# Patient Record
Sex: FEMALE | Race: WHITE | NOT HISPANIC OR LATINO | Employment: FULL TIME | URBAN - METROPOLITAN AREA
[De-identification: names, ages, dates, MRNs, and addresses within clinical notes are randomized per-mention and may not be internally consistent; named-entity substitution may affect disease eponyms.]

---

## 2017-08-01 ENCOUNTER — ALLSCRIPTS OFFICE VISIT (OUTPATIENT)
Dept: OTHER | Facility: OTHER | Age: 37
End: 2017-08-01

## 2017-08-01 PROCEDURE — 87624 HPV HI-RISK TYP POOLED RSLT: CPT | Performed by: OBSTETRICS & GYNECOLOGY

## 2017-08-01 PROCEDURE — G0145 SCR C/V CYTO,THINLAYER,RESCR: HCPCS | Performed by: OBSTETRICS & GYNECOLOGY

## 2017-08-02 ENCOUNTER — LAB REQUISITION (OUTPATIENT)
Dept: LAB | Facility: HOSPITAL | Age: 37
End: 2017-08-02

## 2017-08-02 DIAGNOSIS — Z01.419 ENCOUNTER FOR GYNECOLOGICAL EXAMINATION WITHOUT ABNORMAL FINDING: ICD-10-CM

## 2017-08-03 LAB — HPV RRNA GENITAL QL NAA+PROBE: ABNORMAL

## 2017-08-08 LAB
LAB AP GYN PRIMARY INTERPRETATION: NORMAL
LAB AP LMP: NORMAL
Lab: NORMAL
PATH INTERP SPEC-IMP: NORMAL

## 2017-08-09 ENCOUNTER — GENERIC CONVERSION - ENCOUNTER (OUTPATIENT)
Dept: OTHER | Facility: OTHER | Age: 37
End: 2017-08-09

## 2017-11-07 ENCOUNTER — LAB REQUISITION (OUTPATIENT)
Dept: LAB | Facility: HOSPITAL | Age: 37
End: 2017-11-07
Payer: COMMERCIAL

## 2017-11-07 DIAGNOSIS — R39.9 UNSPECIFIED SYMPTOMS AND SIGNS INVOLVING THE GENITOURINARY SYSTEM: ICD-10-CM

## 2017-11-07 LAB
BACTERIA UR QL AUTO: ABNORMAL /HPF
BILIRUB UR QL STRIP: ABNORMAL
CLARITY UR: ABNORMAL
COLOR UR: ABNORMAL
GLUCOSE UR STRIP-MCNC: ABNORMAL MG/DL
HGB UR QL STRIP.AUTO: ABNORMAL
KETONES UR STRIP-MCNC: ABNORMAL MG/DL
LEUKOCYTE ESTERASE UR QL STRIP: ABNORMAL
NITRITE UR QL STRIP: ABNORMAL
NON-SQ EPI CELLS URNS QL MICRO: ABNORMAL /HPF
PROT UR STRIP-MCNC: ABNORMAL MG/DL
RBC #/AREA URNS AUTO: ABNORMAL /HPF
SP GR UR STRIP.AUTO: 1.03 (ref 1–1.03)
UROBILINOGEN UR QL STRIP.AUTO: ABNORMAL E.U./DL
WBC #/AREA URNS AUTO: ABNORMAL /HPF

## 2017-11-07 PROCEDURE — 81001 URINALYSIS AUTO W/SCOPE: CPT | Performed by: OBSTETRICS & GYNECOLOGY

## 2017-11-07 PROCEDURE — 87086 URINE CULTURE/COLONY COUNT: CPT | Performed by: OBSTETRICS & GYNECOLOGY

## 2017-11-09 LAB — BACTERIA UR CULT: NORMAL

## 2017-11-13 ENCOUNTER — HOSPITAL ENCOUNTER (EMERGENCY)
Facility: HOSPITAL | Age: 37
Discharge: HOME | End: 2017-11-13
Attending: EMERGENCY MEDICINE | Admitting: EMERGENCY MEDICINE
Payer: COMMERCIAL

## 2017-11-13 VITALS
HEART RATE: 96 BPM | RESPIRATION RATE: 16 BRPM | WEIGHT: 192 LBS | OXYGEN SATURATION: 97 % | SYSTOLIC BLOOD PRESSURE: 145 MMHG | DIASTOLIC BLOOD PRESSURE: 81 MMHG | TEMPERATURE: 98.2 F

## 2017-11-13 DIAGNOSIS — O21.9 NAUSEA AND VOMITING IN PREGNANCY: Primary | ICD-10-CM

## 2017-11-13 LAB
ALBUMIN SERPL BCP-MCNC: 3.2 G/DL (ref 3.5–5)
ALP SERPL-CCNC: 84 U/L (ref 46–116)
ALT SERPL W P-5'-P-CCNC: 21 U/L (ref 12–78)
ANION GAP SERPL CALCULATED.3IONS-SCNC: 10 MMOL/L (ref 4–13)
AST SERPL W P-5'-P-CCNC: 40 U/L (ref 5–45)
B-HCG SERPL-ACNC: ABNORMAL MIU/ML
BACTERIA UR QL AUTO: ABNORMAL /HPF
BASOPHILS # BLD AUTO: 0.01 THOUSANDS/ΜL (ref 0–0.1)
BASOPHILS NFR BLD AUTO: 0 % (ref 0–1)
BILIRUB SERPL-MCNC: 0.42 MG/DL (ref 0.2–1)
BILIRUB UR QL STRIP: ABNORMAL
BUN SERPL-MCNC: 6 MG/DL (ref 5–25)
CALCIUM SERPL-MCNC: 9.3 MG/DL (ref 8.3–10.1)
CAOX CRY URNS QL MICRO: ABNORMAL /HPF
CHLORIDE SERPL-SCNC: 100 MMOL/L (ref 100–108)
CLARITY UR: CLEAR
CO2 SERPL-SCNC: 22 MMOL/L (ref 21–32)
COLOR UR: YELLOW
CREAT SERPL-MCNC: 0.58 MG/DL (ref 0.6–1.3)
EOSINOPHIL # BLD AUTO: 0.1 THOUSAND/ΜL (ref 0–0.61)
EOSINOPHIL NFR BLD AUTO: 1 % (ref 0–6)
ERYTHROCYTE [DISTWIDTH] IN BLOOD BY AUTOMATED COUNT: 12.6 % (ref 11.6–15.1)
GFR SERPL CREATININE-BSD FRML MDRD: 118 ML/MIN/1.73SQ M
GLUCOSE SERPL-MCNC: 84 MG/DL (ref 65–140)
GLUCOSE UR STRIP-MCNC: NEGATIVE MG/DL
HCT VFR BLD AUTO: 37 % (ref 34.8–46.1)
HGB BLD-MCNC: 13.2 G/DL (ref 11.5–15.4)
HGB UR QL STRIP.AUTO: NEGATIVE
KETONES UR STRIP-MCNC: ABNORMAL MG/DL
LEUKOCYTE ESTERASE UR QL STRIP: ABNORMAL
LIPASE SERPL-CCNC: 187 U/L (ref 73–393)
LYMPHOCYTES # BLD AUTO: 1.72 THOUSANDS/ΜL (ref 0.6–4.47)
LYMPHOCYTES NFR BLD AUTO: 17 % (ref 14–44)
MCH RBC QN AUTO: 31.7 PG (ref 26.8–34.3)
MCHC RBC AUTO-ENTMCNC: 35.7 G/DL (ref 31.4–37.4)
MCV RBC AUTO: 89 FL (ref 82–98)
MONOCYTES # BLD AUTO: 0.54 THOUSAND/ΜL (ref 0.17–1.22)
MONOCYTES NFR BLD AUTO: 5 % (ref 4–12)
NEUTROPHILS # BLD AUTO: 7.94 THOUSANDS/ΜL (ref 1.85–7.62)
NEUTS SEG NFR BLD AUTO: 77 % (ref 43–75)
NITRITE UR QL STRIP: NEGATIVE
NON-SQ EPI CELLS URNS QL MICRO: ABNORMAL /HPF
NRBC BLD AUTO-RTO: 0 /100 WBCS
PH UR STRIP.AUTO: 5.5 [PH] (ref 4.5–8)
PLATELET # BLD AUTO: 212 THOUSANDS/UL (ref 149–390)
PMV BLD AUTO: 11.1 FL (ref 8.9–12.7)
POTASSIUM SERPL-SCNC: 4.1 MMOL/L (ref 3.5–5.3)
PROT SERPL-MCNC: 8 G/DL (ref 6.4–8.2)
PROT UR STRIP-MCNC: ABNORMAL MG/DL
RBC # BLD AUTO: 4.16 MILLION/UL (ref 3.81–5.12)
RBC #/AREA URNS AUTO: ABNORMAL /HPF
SODIUM SERPL-SCNC: 132 MMOL/L (ref 136–145)
SP GR UR STRIP.AUTO: >=1.03 (ref 1–1.03)
UROBILINOGEN UR QL STRIP.AUTO: 1 E.U./DL
WBC # BLD AUTO: 10.31 THOUSAND/UL (ref 4.31–10.16)
WBC #/AREA URNS AUTO: ABNORMAL /HPF

## 2017-11-13 PROCEDURE — 84702 CHORIONIC GONADOTROPIN TEST: CPT | Performed by: EMERGENCY MEDICINE

## 2017-11-13 PROCEDURE — 83690 ASSAY OF LIPASE: CPT | Performed by: EMERGENCY MEDICINE

## 2017-11-13 PROCEDURE — 81002 URINALYSIS NONAUTO W/O SCOPE: CPT | Performed by: EMERGENCY MEDICINE

## 2017-11-13 PROCEDURE — 96374 THER/PROPH/DIAG INJ IV PUSH: CPT

## 2017-11-13 PROCEDURE — 80053 COMPREHEN METABOLIC PANEL: CPT | Performed by: EMERGENCY MEDICINE

## 2017-11-13 PROCEDURE — 36415 COLL VENOUS BLD VENIPUNCTURE: CPT | Performed by: EMERGENCY MEDICINE

## 2017-11-13 PROCEDURE — 96375 TX/PRO/DX INJ NEW DRUG ADDON: CPT

## 2017-11-13 PROCEDURE — 85025 COMPLETE CBC W/AUTO DIFF WBC: CPT | Performed by: EMERGENCY MEDICINE

## 2017-11-13 PROCEDURE — 96361 HYDRATE IV INFUSION ADD-ON: CPT

## 2017-11-13 PROCEDURE — 81001 URINALYSIS AUTO W/SCOPE: CPT

## 2017-11-13 PROCEDURE — 99283 EMERGENCY DEPT VISIT LOW MDM: CPT

## 2017-11-13 RX ORDER — DOXYLAMINE SUCCINATE AND PYRIDOXINE HYDROCHLORIDE, DELAYED RELEASE TABLETS 10 MG/10 MG 10; 10 MG/1; MG/1
TABLET, DELAYED RELEASE ORAL
COMMUNITY
End: 2018-03-22

## 2017-11-13 RX ORDER — NITROFURANTOIN 25; 75 MG/1; MG/1
100 CAPSULE ORAL ONCE
Status: COMPLETED | OUTPATIENT
Start: 2017-11-13 | End: 2017-11-13

## 2017-11-13 RX ORDER — METOCLOPRAMIDE HYDROCHLORIDE 5 MG/ML
10 INJECTION INTRAMUSCULAR; INTRAVENOUS ONCE
Status: COMPLETED | OUTPATIENT
Start: 2017-11-13 | End: 2017-11-13

## 2017-11-13 RX ORDER — NITROFURANTOIN 25; 75 MG/1; MG/1
100 CAPSULE ORAL 2 TIMES DAILY WITH MEALS
Status: DISCONTINUED | OUTPATIENT
Start: 2017-11-14 | End: 2017-11-13

## 2017-11-13 RX ORDER — METHYLDOPA 250 MG/1
250 TABLET, FILM COATED ORAL
COMMUNITY
Start: 2017-09-08 | End: 2018-03-22

## 2017-11-13 RX ORDER — ONDANSETRON 2 MG/ML
4 INJECTION INTRAMUSCULAR; INTRAVENOUS ONCE
Status: COMPLETED | OUTPATIENT
Start: 2017-11-13 | End: 2017-11-13

## 2017-11-13 RX ADMIN — METOCLOPRAMIDE 10 MG: 5 INJECTION, SOLUTION INTRAMUSCULAR; INTRAVENOUS at 18:44

## 2017-11-13 RX ADMIN — PYRIDOXINE HYDROCHLORIDE 25 MG: 100 INJECTION, SOLUTION INTRAMUSCULAR; INTRAVENOUS at 18:52

## 2017-11-13 RX ADMIN — SODIUM CHLORIDE 1000 ML: 0.9 INJECTION, SOLUTION INTRAVENOUS at 18:44

## 2017-11-13 RX ADMIN — ONDANSETRON 4 MG: 2 INJECTION INTRAMUSCULAR; INTRAVENOUS at 20:57

## 2017-11-13 RX ADMIN — NITROFURANTOIN MONOHYDRATE/MACROCRYSTALLINE 100 MG: 25; 75 CAPSULE ORAL at 20:57

## 2017-11-13 RX ADMIN — SODIUM CHLORIDE 1000 ML: 0.9 INJECTION, SOLUTION INTRAVENOUS at 20:45

## 2017-11-14 NOTE — ED NOTES
Per Dr Mihir Cotter, OB was paged and is to see patient in the ED        Lorena Del Real RN  11/13/17 0273

## 2017-11-14 NOTE — ED NOTES
Patient called in RN and states that she is tired of waiting for OB and would like to go home   Dr Nereida Drew made aware      Ryan Pettit, SAADIA  11/13/17 4456

## 2017-11-14 NOTE — DISCHARGE INSTRUCTIONS
Hyperemesis Gravidarum   WHAT YOU NEED TO KNOW:   Hyperemesis gravidarum is a severe form of nausea and vomiting that happens during pregnancy  Hyperemesis is more severe than morning sickness  It may cause you to have nausea or vomiting all day for many days  It may also keep you from getting enough food and liquid  DISCHARGE INSTRUCTIONS:   Return to the emergency department if:   · You have signs of severe dehydration including little to no urine and dry mouth or lips  · You have severe stomach pain  · You feel too weak or dizzy to stand up  · You see blood in your vomit or bowel movements  Contact your healthcare provider if:   · You cannot keep any food or liquid down  · You are losing weight  · You have a fever  · You have questions or concerns about your condition or care  Medicines:   · Medicines, vitamins, or supplements  may be given to help decrease nausea and vomiting  · Take your medicine as directed  Contact your healthcare provider if you think your medicine is not helping or if you have side effects  Tell him of her if you are allergic to any medicine  Keep a list of the medicines, vitamins, and herbs you take  Include the amounts, and when and why you take them  Bring the list or the pill bottles to follow-up visits  Carry your medicine list with you in case of an emergency  Manage your symptoms:   · Eat small amounts of food every 1 to 2 hours  Some examples of good foods to eat include broth, toast, crackers, fruit, eggs, gelatin, or cottage cheese  Do not eat spicy or high-fat foods  Foods and drinks with ginger, such as ginger ale, may help to decrease nausea and vomiting  · Drink liquids as directed  You may need to drink small amounts of liquid often to prevent dehydration  Ask how much liquid to drink each day and which liquids are best for you  · Rest when you need to    Start activity slowly and work up to your usual routine as you start to feel better  · Medicines, vitamins, or supplements  may be given to help decrease nausea and vomiting  · Weigh yourself daily if directed by your healthcare provider  You may need to keep a record of your daily weights for your healthcare provider  He may want to make sure you are not losing too much weight  Follow up with your healthcare provider as directed:  Write down your questions so you remember to ask them during your visits  © 2017 [employer] Information is for End User's use only and may not be sold, redistributed or otherwise used for commercial purposes  All illustrations and images included in CareNotes® are the copyrighted property of [employer]  or [address]  The above information is an  only  It is not intended as medical advice for individual conditions or treatments  Talk to your doctor, nurse or pharmacist before following any medical regimen to see if it is safe and effective for you

## 2017-11-14 NOTE — ED PROVIDER NOTES
History  Chief Complaint   Patient presents with    Vomiting     pt is 14 weeks pregnant and has been vomiting  PT has been dx with hyperemissis and was just given diclegis  on saturday and she states that she has been vomiting more frequently  pt vomited 6 times today      40year-old  at an estimated 14 weeks by dates presents for evaluation of nausea and vomiting  The patient states that she has had some nausea and vomiting throughout the 1st trimester of her pregnancy  She had been taking Zofran for nausea and vomiting but recently was switched to diclegis and over the past 24 hours has had multiple episodes of vomiting, as many as seven episodes of nonbloody nonbilious vomiting and has had difficulty tolerating solids or liquids by mouth  The patient was seen by her own OBGYN in the office today and her OBGYN suggested that she seek evaluation in the emergency department for her nausea and vomiting  Patient did suffer a subconjunctival hemorrhage from her vomiting  She denies significant abdominal pain and has not had any vaginal bleeding  She was treated for a urinary tract infection starting few days ago  She is currently taking Macrobid twice a day  History provided by:  Patient   used: No    Vomiting   Severity:  Moderate  Duration:  2 days  Timing:  Constant  Number of daily episodes:  7  Quality:  Stomach contents  Progression:  Unchanged  Chronicity:  New  Recent urination:  Decreased  Relieved by:  Nothing  Exacerbated by: PO intake  Ineffective treatments:  Antiemetics  Associated symptoms: no abdominal pain, no cough, no diarrhea, no fever, no headaches and no sore throat    Risk factors: pregnant        Prior to Admission Medications   Prescriptions Last Dose Informant Patient Reported? Taking?    Doxylamine-Pyridoxine (DICLEGIS) 10-10 MG TBEC   Yes Yes   Sig: Take by mouth   methyldopa (ALDOMET) 250 mg tablet   Yes Yes   Sig: Take 250 mg by mouth sertraline (ZOLOFT) 50 mg tablet   Yes Yes   Sig: Take 50 mg by mouth daily      Facility-Administered Medications: None       Past Medical History:   Diagnosis Date    Hypertension     Psychiatric disorder        Past Surgical History:   Procedure Laterality Date    ABDOMINAL SURGERY         History reviewed  No pertinent family history  I have reviewed and agree with the history as documented  Social History   Substance Use Topics    Smoking status: Never Smoker    Smokeless tobacco: Never Used    Alcohol use No        Review of Systems   Constitutional: Negative for activity change, appetite change, fatigue, fever and unexpected weight change  HENT: Negative for congestion, sore throat and voice change  Eyes: Negative for pain and visual disturbance  Respiratory: Negative for cough, chest tightness and shortness of breath  Cardiovascular: Negative for chest pain  Gastrointestinal: Positive for vomiting  Negative for abdominal pain, diarrhea and nausea  Endocrine: Negative for polyphagia and polyuria  Genitourinary: Negative for difficulty urinating, dysuria, flank pain, frequency and urgency  Musculoskeletal: Negative for back pain, gait problem, neck pain and neck stiffness  Skin: Negative for color change and rash  Allergic/Immunologic: Negative for immunocompromised state  Neurological: Negative for dizziness, syncope, speech difficulty, light-headedness, numbness and headaches  Hematological: Does not bruise/bleed easily  Psychiatric/Behavioral: Negative for confusion and decreased concentration         Physical Exam  ED Triage Vitals [11/13/17 1754]   Temperature Pulse Respirations Blood Pressure SpO2   98 2 °F (36 8 °C) (!) 106 18 147/67 98 %      Temp Source Heart Rate Source Patient Position - Orthostatic VS BP Location FiO2 (%)   Oral Monitor Sitting Right arm --      Pain Score       No Pain           Orthostatic Vital Signs  Vitals:    11/13/17 1904 11/13/17 2100 11/13/17 2217 11/13/17 2342   BP: 137/73 134/69 149/72 145/81   Pulse: 90 96 93 96   Patient Position - Orthostatic VS: Sitting Sitting Sitting Sitting       Physical Exam   Constitutional: She is oriented to person, place, and time  She appears well-developed and well-nourished  HENT:   Head: Normocephalic and atraumatic  Eyes: Conjunctivae and EOM are normal  Pupils are equal, round, and reactive to light  No scleral icterus  Right eye subconjunctival hemorrhage noted  Neck: Normal range of motion  Neck supple  No JVD present  No tracheal deviation present  Cardiovascular: Normal rate and regular rhythm  Pulmonary/Chest: Effort normal and breath sounds normal  No respiratory distress  Abdominal: Soft  She exhibits no distension  There is no tenderness  There is no rebound and no guarding  Soft, nondistended, nontender to palpation  No rebound tenderness or guarding  Normal bowel sounds  A bedside ultrasound demonstrates a intrauterine pregnancy with a fetal heart rate of 160 beats per minute  Musculoskeletal: Normal range of motion  She exhibits no tenderness or deformity  Lymphadenopathy:     She has no cervical adenopathy  Neurological: She is alert and oriented to person, place, and time  No gross focal sensory or motor deficits   Skin: Skin is warm and dry  No rash noted  She is not diaphoretic  Psychiatric: She has a normal mood and affect  Vitals reviewed        ED Medications  Medications   sodium chloride 0 9 % bolus 1,000 mL (0 mL Intravenous Stopped 11/13/17 2040)   metoclopramide (REGLAN) injection 10 mg (10 mg Intravenous Given 11/13/17 1844)   pyridoxine (VITAMIN B6) injection 25 mg (25 mg Intravenous Given 11/13/17 1852)   ondansetron (ZOFRAN) injection 4 mg (4 mg Intravenous Given 11/13/17 2057)   sodium chloride 0 9 % bolus 1,000 mL (0 mL Intravenous Stopped 11/13/17 2155)   nitrofurantoin (MACROBID) extended-release capsule 100 mg (100 mg Oral Given 11/13/17 2057) Diagnostic Studies  Results Reviewed     Procedure Component Value Units Date/Time    POCT urinalysis dipstick [[id number]]  (Abnormal) Resulted:  11/13/17 2004    Lab Status:  Final result Specimen:  Urine Updated:  11/13/17 2037    Urine Microscopic [[id number]]  (Abnormal) Collected:  11/13/17 2120    Lab Status:  Final result Specimen:  Urine from Urine, Clean Catch Updated:  11/13/17 2037     RBC, UA None Seen /hpf      WBC, UA 1-2 (A) /hpf      Epithelial Cells Occasional /hpf      Bacteria, UA Occasional /hpf      Ca Oxalate Elly, UA Moderate (A) /hpf     ED Urine Macroscopic [[id number]]  (Abnormal) Collected:  11/13/17 2120    Lab Status:  Final result Specimen:  Urine Updated:  11/13/17 2005     Color, UA Yellow     Clarity, UA Clear     pH, UA 5 5     Leukocytes, UA Trace (A)     Nitrite, UA Negative     Protein, UA 30 (1+) (A) mg/dl      Glucose, UA Negative mg/dl      Ketones, UA 80 (3+) (A) mg/dl      Urobilinogen, UA 1 0 E U /dl      Bilirubin, UA Interference- unable to analyze (A)     Blood, UA Negative     Specific Gravity, UA >=1 030    Narrative:       CLINITEK RESULT    Quantitative hCG [[id number]]  (Abnormal) Collected:  11/13/17 1844    Lab Status:  Final result Specimen:  Blood from Arm, Right Updated:  11/13/17 1944     HCG, Quant 42,522 7 (H) mIU/mL     Narrative:          Expected Ranges:     Approximate               Approximate HCG  Gestation age          Concentration ( mIU/mL)  _____________          ______________________   Lindia Million                      HCG values  0 2-1                       5-50  1-2                           2-3                         100-5000  3-4                         500-59818  4-5                         1000-44139  5-6                         [id number]  6-8                         [id number]  8-12                        03811-333407    Comprehensive metabolic panel [[id number]]  (Abnormal) Collected:  11/13/17 1844    Lab Status:  Final result Specimen:  Blood from Arm, Right Updated:  11/13/17 1924     Sodium 132 (L) mmol/L      Potassium 4 1 mmol/L      Chloride 100 mmol/L      CO2 22 mmol/L      Anion Gap 10 mmol/L      BUN 6 mg/dL      Creatinine 0 58 (L) mg/dL      Glucose 84 mg/dL      Calcium 9 3 mg/dL      AST 40 U/L      ALT 21 U/L      Alkaline Phosphatase 84 U/L      Total Protein 8 0 g/dL      Albumin 3 2 (L) g/dL      Total Bilirubin 0 42 mg/dL      eGFR 118 ml/min/1 73sq m     Narrative:         National Kidney Disease Education Program recommendations are as follows:  GFR calculation is accurate only with a steady state creatinine  Chronic Kidney disease less than 60 ml/min/1 73 sq  meters  Kidney failure less than 15 ml/min/1 73 sq  meters      Lipase [[id number]]  (Normal) Collected:  11/13/17 1844    Lab Status:  Final result Specimen:  Blood from Arm, Right Updated:  11/13/17 1924     Lipase 187 u/L     CBC and differential [[id number]]  (Abnormal) Collected:  11/13/17 1844    Lab Status:  Final result Specimen:  Blood from Arm, Right Updated:  11/13/17 1901     WBC 10 31 (H) Thousand/uL      RBC 4 16 Million/uL      Hemoglobin 13 2 g/dL      Hematocrit 37 0 %      MCV 89 fL      MCH 31 7 pg      MCHC 35 7 g/dL      RDW 12 6 %      MPV 11 1 fL      Platelets 509 Thousands/uL      nRBC 0 /100 WBCs      Neutrophils Relative 77 (H) %      Lymphocytes Relative 17 %      Monocytes Relative 5 %      Eosinophils Relative 1 %      Basophils Relative 0 %      Neutrophils Absolute 7 94 (H) Thousands/µL      Lymphocytes Absolute 1 72 Thousands/µL      Monocytes Absolute 0 54 Thousand/µL      Eosinophils Absolute 0 10 Thousand/µL      Basophils Absolute 0 01 Thousands/µL                  No orders to display              Procedures  Pelvic Ultrasound  Performed by: Romulo Beltrán  Authorized by: Romulo Beltrán     Procedure date/time:  11/13/2017 6:43 PM  Patient location:  ED  Other Assisting Provider: No    Procedure details:     Indications: evaluate for IUP      Assess:  Intrauterine pregnancy and fetal viability    Technique:  Transabdominal obstetric (limited) exam  Uterine findings:     Intrauterine pregnancy: identified      Single gestation: identified      Fetal heart rate: identified      Fetal heart rate (bpm):  160  Other findings:     Free pelvic fluid: not identified      Free peritoneal fluid: not identified             Phone Contacts  ED Phone Contact    ED Course  ED Course                                MDM  Number of Diagnoses or Management Options  Nausea and vomiting in pregnancy: new and requires workup  Diagnosis management comments: 80-year-old female presented for evaluation of nausea and vomiting in pregnancy  She is an estimated 14 weeks by dates  She has had previous ultrasounds by her OBGYN to verify intrauterine pregnancy  A bedside ultrasound today demonstrated a viable stark IUP with a fetal heart rate of 160  She was noted to have ketones in her urine although blood work appeared within normal limits  A quantitative HCG was around 40,000  Patient has not had any vaginal bleeding  There is no sign of ongoing or worsening urinary tract infection on urinalysis today  Review of the medical record demonstrates that patient's urine culture from several days ago grew only 20-29,000 colonies of mixed contaminants  She was treated with both Reglan and IV Zofran in the emergency department and received 2 L of IV crystalloid  She was re-evaluated prior to discharge and had significant improvement of her symptoms  She was able to tolerate fluids by mouth  She will be discharged to follow up with her OBGYN  The patient had been taking Zofran previously with reasonable results  She states that she has Zofran at home and does not want another prescription  Will discharge to follow up with PCP  Discussed return precautions for new or worsening symptoms         Amount and/or Complexity of Data Reviewed  Clinical lab tests: reviewed and ordered  Review and summarize past medical records: yes  Independent visualization of images, tracings, or specimens: yes      CritCare Time    Disposition  Final diagnoses:   Nausea and vomiting in pregnancy     Time reflects when diagnosis was documented in both MDM as applicable and the Disposition within this note     Time User Action Codes Description Comment    11/13/2017 11:37 PM Neville Chas PARKER Add [O21 9] Nausea and vomiting in pregnancy       ED Disposition     ED Disposition Condition Comment    Discharge  Faby Johnston discharge to home/self care  Condition at discharge: Good        Follow-up Information     Follow up With Specialties Details Why Contact Info    Slick Dey DO Obstetrics and Gynecology  Please follow-up with your OBGYN in 2 to 3 days if your symptoms do not improve  If you have new or worsening symptoms please call your doctor or return to the emergency department  [place] [id number] Jovan [address]          Discharge Medication List as of 11/13/2017 11:38 PM      CONTINUE these medications which have NOT CHANGED    Details   Doxylamine-Pyridoxine (DICLEGIS) 10-10 MG TBEC Take by mouth, Historical Med      methyldopa (ALDOMET) 250 mg tablet Take 250 mg by mouth, Starting Fri 9/8/2017, Until Sat 9/8/2018, Historical Med      sertraline (ZOLOFT) 50 mg tablet Take 50 mg by mouth daily, Historical Med           No discharge procedures on file      ED Provider  Electronically Signed by           Fabio Dyer MD  11/13/17 7719

## 2018-01-11 NOTE — RESULT NOTES
Verified Results  (1) THIN PREP PAP WITH IMAGING [id number] 03:05PM Triny Dasilva     Test Name Result Flag Reference   LAB AP CASE REPORT (Report)     Gynecologic Cytology Report            Case: [id number]                  Authorizing Provider: Gigi Rome DO    Collected:      08/01/2017 1505        First Screen:     LUIS ENRIQUE Junior    Received:      08/02/2017 1511        Pathologist:      Marisa Pappas MD                                 Specimen:  LIQUID-BASED PAP, SCREENING, Cervix   HPV HIGH RISK RESULT (Report)     HPV, High Risk: HPV POS, HPV16 NEG, HPV18 NEG      Other High Risk HPV Positive, HPV 16 Negative, HPV 18 Negative  Specimen is positive for the DNA of any one of, or combination of the following high risk HPV types: 29,02,05,51,47,47,96,20,28,84,40,09  HPV types 16 and 18 DNA were undetectable or below the pre-set threshold  [employer]???s FDA approved Flynn 4800 is utilized with strict adherence to the ???s instruction  manual to test for the presence of High-Risk HPV DNA, as well as HPV 16 and HPV 18  This instrument  has been validated by our laboratory and/or by the   A negative result does not preclude the presence of HPV infection because results depend on adequate  specimen collection, absence of inhibitors and sufficient DNA to be detected  Additionally, HPV negative  results are not intended to prevent women from proceeding to colposcopy if clinically warranted  Positive HPV test results indicate the presence of any one or more of the high risk types, but since patients  are often co-infected with low-risk types it does not rule out the presence of low-risk types in patients  with mixed infections     LAB AP GYN PRIMARY INTERPRETATION      Epithelial cell abnormality  Electronically signed by Marisa Pappas MD on 8/8/2017 at 12:23 PM   LAB AP GYN INTERPRETATION      Atypical squamous cells of undetermined significance  Fungal organisms morphologically consistent with Candida spp   LAB AP GYN SPECIMEN ADEQUACY      Satisfactory for evaluation  Endocervical/transformation zone component present  LAB AP GYN NOTE      Interpretation performed at [facility], [address], [address]   LAB AP GYN ADDITIONAL INFORMATION (Report)     [employer]'s FDA approved ,  and ThinPrep Imaging System are   utilized with strict adherence to the 's instruction manual to   prepare gynecologic and non-gynecologic cytology specimens for the   production of ThinPrep slides as well as for gynecologic ThinPrep imaging  These processes have been validated by our laboratory and/or by the     The Pap test is not a diagnostic procedure and should not be used as the   sole means to detect cervical cancer  It is only a screening procedure to   aid in the detection of cervical cancer and its precursors  Both   false-negative and false-positive results have been experienced  Your   patient's test result should be interpreted in this context together with   the history and clinical findings     LAB AP CLINICAL INFORMATION      Encounter for gynecological examination without abnormal finding   LAB AP LMP 7/13/2017

## 2018-01-13 VITALS
SYSTOLIC BLOOD PRESSURE: 122 MMHG | HEIGHT: 66 IN | BODY MASS INDEX: 32.47 KG/M2 | DIASTOLIC BLOOD PRESSURE: 80 MMHG | WEIGHT: 202 LBS

## 2018-03-20 ENCOUNTER — TRANSCRIBE ORDERS (OUTPATIENT)
Dept: PERINATAL CARE | Facility: CLINIC | Age: 38
End: 2018-03-20

## 2018-03-20 DIAGNOSIS — O99.810 IMPAIRED GLUCOSE TOLERANCE IN PREGNANCY: Primary | ICD-10-CM

## 2018-03-22 ENCOUNTER — OFFICE VISIT (OUTPATIENT)
Dept: PERINATAL CARE | Facility: CLINIC | Age: 38
End: 2018-03-22
Payer: COMMERCIAL

## 2018-03-22 VITALS
WEIGHT: 201.4 LBS | DIASTOLIC BLOOD PRESSURE: 62 MMHG | BODY MASS INDEX: 32.37 KG/M2 | SYSTOLIC BLOOD PRESSURE: 137 MMHG | HEART RATE: 86 BPM | HEIGHT: 66 IN

## 2018-03-22 DIAGNOSIS — Z3A.33 33 WEEKS GESTATION OF PREGNANCY: ICD-10-CM

## 2018-03-22 DIAGNOSIS — O24.414 INSULIN CONTROLLED GESTATIONAL DIABETES MELLITUS (GDM) IN THIRD TRIMESTER: Primary | ICD-10-CM

## 2018-03-22 DIAGNOSIS — O99.810 IMPAIRED GLUCOSE TOLERANCE IN PREGNANCY: ICD-10-CM

## 2018-03-22 PROCEDURE — G0108 DIAB MANAGE TRN  PER INDIV: HCPCS

## 2018-03-22 RX ORDER — GLYBURIDE-METFORMIN HYDROCHLORIDE 2.5; 5 MG/1; MG/1
1 TABLET ORAL
COMMUNITY
End: 2018-03-22

## 2018-03-22 RX ORDER — IRON POLYSACCHARIDE COMPLEX 150 MG
150 CAPSULE ORAL DAILY
COMMUNITY
End: 2021-06-28

## 2018-03-22 RX ORDER — PEN NEEDLE, DIABETIC 32GX 5/32"
NEEDLE, DISPOSABLE MISCELLANEOUS
Qty: 60 EACH | Refills: 0 | Status: SHIPPED | OUTPATIENT
Start: 2018-03-22 | End: 2021-06-28

## 2018-03-22 RX ORDER — ONDANSETRON HYDROCHLORIDE 24 MG/1
4 TABLET, FILM COATED ORAL ONCE
COMMUNITY
End: 2018-04-24 | Stop reason: HOSPADM

## 2018-03-22 NOTE — PROGRESS NOTES
DATE: 18   RE: Lisbet Kim   : 1980  HARSHIL: Estimated Date of Delivery: 18  EGA:  33w2d    Dear Dr Chloe Roper:     Your patient was seen in the office today for insulin teaching  Please refer to Diabetes and Pregnancy Progress Record for complete documentation of patients blood glucose levels  Height:   5'6"  Weight:  201 4 lbs    The patient was instructed on the following:     Levemir Pen use  Initiate  at 10 units at 7 am and 10 units at 7 pm  Will titrate dose   Glyburide discontinued secondary to early am hypoglycemia   Discussed Metformin risks and benefits  Informed it does cross placenta   Reviewed basal/bolus concept   Insulin administration times, insulin action   Hypoglycemia signs, symptoms and treatment  Information sheet provided   Increase in maternal-fetal surveillance with insulin initiation   Side effects of hyperglycemia in pregnancy including macrosomia,  hypoglycemia, polyhydramnios, pre-term labor and stillbirth   Continue to monitor blood glucoses via fingerstick fasting (goal 60 mg/dl to 90 mg/dl) and two hours post prandial (goal less than 120 mg/dl)  Add 3 am blood sugar check   GDM 2400 calorie diet, 3 meals and 3 snacks including protein provided   Follow up with our office on 1 week for evaluation of blood glucose levels   Non-stress testing two times weekly and ILSA testing beginning at 32 weeks gestation   Schedule fetal growth in  center and repeat in 4 weeks   HbA1c and CMP ordered  Thank you for the opportunity to participate in the care of this patient  I can be reached at [phone] should you have any questions  Time spent with patient 60 minutes; time spent face to face counseling greater than 50% of the appointment      Sincerely,     ZBIGNIEW Mckeon  Diabetes Educator  Diabetes and Pregnancy Program

## 2018-03-22 NOTE — PROGRESS NOTES
Date:  18  RE: Nandini Call    : 1980  HARSHIL: Estimated Date of Delivery: 18  EGA: 33w2d             Dear Dr Dario Devlin and doctor's at [facility],   Thank you for referring your patient to the Diabetes and Pregnancy Program at [address]  The patient attended Class 1 received the following education:     Pathophysiology of diabetes and pregnancy  This includes maternal-fetal complications such as fetal macrosomia,  hypoglycemia, polyhydramnios, increased incidence of  section, pre-term labor and in severe cases, fetal demise and stillbirth   Self-monitoring of blood glucose levels: fasting (goal 60mg/dl to 90mg/dl) and two hours after the start of the meal less (goal less than 120mg/dl)  The patient has been testing with a One Touch UltraMini blood glucose meter  Please see attached blood sugar report   Medical Nutrition Therapy for diabetes and pregnancy  The patient was provided with a 2400 calorie gestational diabetes meal plan and the following was reviewed:     o Basic review of macronutrients   o Meal pattern should consist of three small meals and three snacks daily  o Carbohydrate gram amounts per meal   o Instructions on how to read a food label  o Appropriate serving sizes for carbohydrates and proteins  o Incorporating protein at each meal and snack  * Patient's food diary was reviewed today  Patient has been trying to follow diet  Advised patient to avoid desert items  Provided patient with another three day food diary to bring to follow up appointment   Report blood glucose levels to the [address] [place] [address] weekly or as directed:  o Phone : [phone]  If no response in 24 hours, call [phone]   o Fax: [phone]  o Email: [email or web address]  [address] [place] [address], Diabetes and Pregnancy Assistant will contact patient to schedule a follow up appointmnet     Additionally, fetal ultrasound evaluation by the Perinatologist has been scheduled to assure continuity of care  Patient provided a 21 day blood sugar log  The most recent blood sugars are noted below and additional blood sugars are scanned under media  Date Fasting Post-  breakfast Post-  lunch Post-  dinner Before bedtime Carbs Comments   3-9 98 110 112 140      3-10 93 90 100 119   Started 2 5 mg glyburide at bedtime   3-11 88  150      3-12 78 115 114 113   Forgot glyburide   3-13 104 136 96 106      3-14 96 123 93 94      3-15 88 104 106 NR      3-16 101 175 126 156      3-17 0100  68 126 NR NR      3-18 0127  55 103 94 133   Decrease dose glyburide to 1 25 mg   3-19 94 105 134 132      3-20 97 107 82 110      3-21 94 106 100 99      3-22 103           Patient met with ZBIGNIEW Caraballo for insulin education please refer to today's note  Current regimen:  Glyburide 2 5 mg at bedtime (started as above by Ob)  Patient reported splitting the pill in half due to early morning hypoglycemia  Patient was following a 3 meal/3 snack diet including protein in all meals and snacks which was instructed by ZBIGNIEW at the [facility] office  Self blood glucose monitoring 4 times per day including fasting blood sugars, 2 hours pp (patient reported 2 hours starting at the end of the meal)  Plan:  Start Levemir- 10 units 7 am and 10 units 7 pm  Insulin education completed by Yane London, [facility]  Discontinue glyburide  2400 calorie gestational diabetes meal plan; 3 meals/3 snacks  Patient has a fairly active job as a   Self blood glucose monitoring 4 times per day including fasting blood sugars, 2 hours pp (advised patient to 2 hours starting at the beginning of the meal)  A follow up appointment will be scheduled  Date due to report next:  Requested patient report on Monday, 3-26  Please contact the Diabetes and Pregnancy Program at [phone] if you have any questions    Time spent with patient [phone] ; time spent face to face counseling greater than 50% of the appointment      Sincerely,   Loulou Richardson RD,LDN,CDE  Diabetes Educator   Diabetes and Pregnancy Program

## 2018-03-23 ENCOUNTER — DOCUMENTATION (OUTPATIENT)
Dept: PERINATAL CARE | Facility: CLINIC | Age: 38
End: 2018-03-23

## 2018-03-23 NOTE — PROGRESS NOTES
Date:  18  RE: Megan Spain    : 1980  HARSHIL: Estimated Date of Delivery: 18  EGA: 33w3d  Ob: Season's of Life  Meter: One Touch Ultra Mini    Insulin dependent GDM    Date 3 am Fasting Post-  breakfast Post-  lunch Post-  dinner   3/22     132   3/23 114 103 117                                       Current regimen:  Levemir 10 units at 7 am and 10 units at 7 pm    2400 calorie GDM diet with 3 meals and 3 snacks including protein  SMBG FBS, 2 hour PP and 3 am     Plan:  Increase Levemir from 10 to 15 units at 7 am and from 10 to 15 units at 7 pm  Titrate until goal BG  Follow GDM diet and SMBG  Check CMP and A1c  Consider adding Metformin to regimen  Always have glucose available for hypoglycemia, use 15 by 15 rule  Date due to report next:  Monday, 3/26 or sooner if needed       ZBIGNIEW Solis  Diabetes and Pregnancy Program

## 2018-03-26 ENCOUNTER — DOCUMENTATION (OUTPATIENT)
Dept: PERINATAL CARE | Facility: CLINIC | Age: 38
End: 2018-03-26

## 2018-03-26 NOTE — PROGRESS NOTES
Date:  18  RE: Lisbet Kim    : 1980  HARSHIL: Estimated Date of Delivery: 18  EGA: 33w6d  Ob: Season's of Life  Meter: One Touch Ultra Mini    Insulin dependent GDM    Date 3 am Fasting Post-  breakfast Post-  lunch Post-  dinner   3/23    89 148   3/24 91 78  139   3/25 93 94 112 119 91   3/26 103 101 102                       Current regimen:  Levemir 15 titrated to 17 units at 730 am and 15 units at 730 pm    2400 calorie GDM diet with 3 meals and 3 snacks including protein  SMBG FBS, 2 hour PP and 3 am     Plan:  Continue Levemir from 17 units at 730 am, due to FBS >90, increase Levemir from 15 to 18 units at 730 pm   Continue GDM diet and SMBG  Pending A1c and CMP results  Consider adding Metformin to regimen  Always have glucose available for hypoglycemia, use 15 by 15 rule  Fetal growth scan every 4 weeks  Date due to report next:  Thursday, 3/29 or sooner if needed       ZBIGNIEW Mckeon  Diabetes and Pregnancy Program

## 2018-03-29 ENCOUNTER — OFFICE VISIT (OUTPATIENT)
Dept: PERINATAL CARE | Facility: CLINIC | Age: 38
End: 2018-03-29
Payer: COMMERCIAL

## 2018-03-29 VITALS
BODY MASS INDEX: 32.53 KG/M2 | SYSTOLIC BLOOD PRESSURE: 128 MMHG | DIASTOLIC BLOOD PRESSURE: 63 MMHG | HEIGHT: 66 IN | HEART RATE: 92 BPM | WEIGHT: 202.4 LBS

## 2018-03-29 DIAGNOSIS — O24.414 INSULIN CONTROLLED GESTATIONAL DIABETES MELLITUS (GDM) IN THIRD TRIMESTER: Primary | ICD-10-CM

## 2018-03-29 PROCEDURE — G0108 DIAB MANAGE TRN  PER INDIV: HCPCS

## 2018-03-30 NOTE — PROGRESS NOTES
DATE:  18  RE: Bacilio Rodriguez    : 1980    HARSHIL: 18    EGA: 34w3d    Dear Dr Angela Freeman of [facility] doctors,    The patient also reported blood sugars at Class 2 appointment  Date Fasting Post-  breakfast Post-  lunch Post-  dinner Before bedtime Carbs 3 AM  Comments   3-22    132   114     3- 103 117 89 148   91     3-24 78  139   93     3- 94 112 119 91   103     3-26 101 102 77 164   96     3-27 98 134 105 116 142  102     3-28 98 89 128 121 143  104     3-29 90 90 139- 1hr 121            Current regimen:  2400 calorie gestational diabetes meal plan  Levemir- 17 units at 7:30 AM and 18 units at 7:30 PM  Consider adding Metformin to regimen  Pending A1c and CMP results  Always have glucose available for hypoglycemia, use 15 by 15 rule  Fetal growth scan every 4 weeks  Self blood glucose monitoring including fasting blood sugar and 2 hours pp unless otherwise noted  Patient was instructed to discontinue 3 AM testing  Plan:  Levemir- increase 17 to 20 units at 7:30 am                 Increase 18 to 22 units at 7:30 pm     Continue 2400 calorie gestational diabetes meal plan  Continue self blood glucose monitoring 4 times per day  Date due to report next:  Tuesday, 4-3-18    Thank you for referring your patient to the Diabetes and Pregnancy Program at [address]  The patient attended Class 2 received the following education:    Weight gain during in pregnancy  Based on the patients height of 5' 6" (1 676 m) inches, pre-pregnancy weight of 202 pounds BMI (32 6), we would recommend a total weight gain of 11-20 pounds for the pregnancy   The patients current weight is 91 8 kg (202 lb 6 4 oz)pounds, and her weight gain to date is 0 pounds  Based on this, we recommend a weight gain of no more than 11-20 pounds for the remainder of the pregnancy   Medical Nutrition Therapy for diabetes and pregnancy    The patients three day food diary was reviewed and discussed  The patient was instructed on the following:  o Individualized meal plan    o Use of food diary to maintain a meal plan    o Importance of protein as it relates to blood glucose control   Review of blood glucose log  Reinforcement of blood glucose goals and reporting guidelines   Ultrasounds every four weeks in the [facility] to evaluate fetal growth   Exercise Guidelines:   o Walking up to thirty minutes daily can reduce blood glucose levels  o Monitor for greater than four contractions per hour     o The patient has been instructed not to begin physical activity if she has been instructed not to exercise by your office   Sick day guidelines and hypoglycemia with treatment   Post-partum guidelines:  o Completion of a 75 gram glucose tolerance test at 6 weeks post-partum to check for type 2 diabetes  o 20% weight loss and 30 minutes of exercise 5 times per week reduces the risk of type 2 diabetes   Breastfeeding guidelines   Report blood glucose levels to [address] weekly or as directed  o Phone: [phone]  If no response in 24 hours, call [phone]    o Fax: [phone]  o Email: [email or web address]    Please contact the Diabetes and Pregnancy Program at [phone] if you have questions  Time spent with patient [phone]; time spent face to face counseling greater than 50% of the appointment      Sincerely,     Vishal Casas RD,LDN,CDE  Diabetes Educator  Diabetes and Pregnancy Program

## 2018-04-04 ENCOUNTER — DOCUMENTATION (OUTPATIENT)
Dept: PERINATAL CARE | Facility: CLINIC | Age: 38
End: 2018-04-04

## 2018-04-04 NOTE — PROGRESS NOTES
Date:  18  RE: Cliff Redding    : 1980  HARSHIL: Estimated Date of Delivery: 18  EGA: 35w1d  Ob: Season's of Life  Meter: One Touch Ultra Mini    Insulin dependent GDM    Date 3 am Fasting Post-  breakfast Post-  lunch Post-  dinner   3/30  95 110 138 143   3/31  97 113 135 130   4/1  100 131 98 127   /2  100 120 108 142   /3  96 120 103 113   /4  85 98 100      Current regimen:  Levemir 20 units at 730 am and 22 units at 730 pm    2400 calorie GDM diet with 3 meals and 3 snacks including protein  Reports decreasing carb intake and noticing improvement with blood sugar readings  SMBG FBS and 2 hour PP  Plan:  Continue Levemir 20 units at 730 am    Increase Levemir from 22 to 24 units at 730 pm    Follow GDM diet and SMBG  Check CMP and A1c  Always have glucose available for hypoglycemia, use 15 by 15 rule  Date due to report next:  Friday,  or sooner if needed       ZBIGNIEW King  Diabetes and Pregnancy Program

## 2018-04-05 ENCOUNTER — ROUTINE PRENATAL (OUTPATIENT)
Dept: PERINATAL CARE | Facility: CLINIC | Age: 38
End: 2018-04-05
Payer: COMMERCIAL

## 2018-04-05 VITALS
WEIGHT: 205.8 LBS | DIASTOLIC BLOOD PRESSURE: 66 MMHG | SYSTOLIC BLOOD PRESSURE: 130 MMHG | HEART RATE: 97 BPM | HEIGHT: 66 IN | BODY MASS INDEX: 33.07 KG/M2

## 2018-04-05 DIAGNOSIS — O99.213 MATERNAL OBESITY SYNDROME IN THIRD TRIMESTER: ICD-10-CM

## 2018-04-05 DIAGNOSIS — O40.3XX0 POLYHYDRAMNIOS IN SINGLETON PREGNANCY IN THIRD TRIMESTER: ICD-10-CM

## 2018-04-05 DIAGNOSIS — O09.513 ELDERLY PRIMIGRAVIDA IN THIRD TRIMESTER: ICD-10-CM

## 2018-04-05 DIAGNOSIS — Z3A.35 35 WEEKS GESTATION OF PREGNANCY: ICD-10-CM

## 2018-04-05 DIAGNOSIS — O24.414 INSULIN CONTROLLED GESTATIONAL DIABETES MELLITUS IN THIRD TRIMESTER: Primary | ICD-10-CM

## 2018-04-05 PROCEDURE — 76818 FETAL BIOPHYS PROFILE W/NST: CPT | Performed by: OBSTETRICS & GYNECOLOGY

## 2018-04-05 PROCEDURE — 76811 OB US DETAILED SNGL FETUS: CPT | Performed by: OBSTETRICS & GYNECOLOGY

## 2018-04-05 PROCEDURE — 99242 OFF/OP CONSLTJ NEW/EST SF 20: CPT | Performed by: OBSTETRICS & GYNECOLOGY

## 2018-04-05 NOTE — PROGRESS NOTES
Please refer to the [facility] ultrasound report in Ob Procedures for additional information regarding the visit to the [facility] include 234 beds at the [facility], [facility]  today

## 2018-04-05 NOTE — PROGRESS NOTES
NST procedure and expected outcome explained to patient  Daily fetal kick count discussed with handout given  Patient verbalized understanding of all and was receptive      Rip SAADIA Samuel

## 2018-04-07 PROBLEM — O40.3XX0 POLYHYDRAMNIOS IN SINGLETON PREGNANCY IN THIRD TRIMESTER: Status: ACTIVE | Noted: 2018-04-07

## 2018-04-10 ENCOUNTER — DOCUMENTATION (OUTPATIENT)
Dept: PERINATAL CARE | Facility: CLINIC | Age: 38
End: 2018-04-10

## 2018-04-10 ENCOUNTER — ROUTINE PRENATAL (OUTPATIENT)
Dept: PERINATAL CARE | Facility: CLINIC | Age: 38
End: 2018-04-10
Payer: COMMERCIAL

## 2018-04-10 ENCOUNTER — APPOINTMENT (OUTPATIENT)
Dept: PERINATAL CARE | Facility: CLINIC | Age: 38
End: 2018-04-10
Payer: COMMERCIAL

## 2018-04-10 VITALS
SYSTOLIC BLOOD PRESSURE: 137 MMHG | HEART RATE: 86 BPM | WEIGHT: 208.4 LBS | HEIGHT: 66 IN | DIASTOLIC BLOOD PRESSURE: 68 MMHG | BODY MASS INDEX: 33.49 KG/M2

## 2018-04-10 VITALS
SYSTOLIC BLOOD PRESSURE: 137 MMHG | HEIGHT: 66 IN | BODY MASS INDEX: 33.49 KG/M2 | DIASTOLIC BLOOD PRESSURE: 68 MMHG | HEART RATE: 86 BPM | WEIGHT: 208.4 LBS

## 2018-04-10 DIAGNOSIS — O24.414 INSULIN CONTROLLED GESTATIONAL DIABETES MELLITUS IN THIRD TRIMESTER: Primary | ICD-10-CM

## 2018-04-10 DIAGNOSIS — O24.414 INSULIN CONTROLLED GESTATIONAL DIABETES MELLITUS IN THIRD TRIMESTER: ICD-10-CM

## 2018-04-10 DIAGNOSIS — O40.3XX0 POLYHYDRAMNIOS IN SINGLETON PREGNANCY IN THIRD TRIMESTER: ICD-10-CM

## 2018-04-10 PROCEDURE — 76825 ECHO EXAM OF FETAL HEART: CPT | Performed by: PEDIATRICS

## 2018-04-10 PROCEDURE — 76827 ECHO EXAM OF FETAL HEART: CPT | Performed by: PEDIATRICS

## 2018-04-10 PROCEDURE — 93325 DOPPLER ECHO COLOR FLOW MAPG: CPT | Performed by: PEDIATRICS

## 2018-04-10 PROCEDURE — 59025 FETAL NON-STRESS TEST: CPT | Performed by: OBSTETRICS & GYNECOLOGY

## 2018-04-10 NOTE — PROGRESS NOTES
Date:  04/10/18  RE: Chan Armijo    : 1980  HARSHIL: Estimated Date of Delivery: 18  EGA: 36w0d  Ob: Season's of Life  Meter: One Touch Ultra Mini    Insulin dependent GDM    Date Fasting Post-  breakfast Post-  lunch Post-  dinner       156   4/5 84 89 115 141   4/6 84 74 73 138   4/7 84 96 105 111   4/8 89 98 136 120   4/9 92 95 84 131   4/10 89 89 86      Current regimen:  Levemir 20 units at 730 am and 24 units at 730 pm    2400 calorie GDM diet with 3 meals and 3 snacks including protein  SMBG FBS and 2 hour PP  Plan:  Increase Levemir from 20 to 24 units at 730 am    Increase Levemir from 24 to 26 units at 730 pm    Follow GDM diet and continue SMBG  Always have glucose available for hypoglycemia, use 15 by 15 rule  3/24/18 A1c 5 3%  Copy of A1c and CMP results requested from [facility]  Follow-up fetal surveillance as recommended by Dr Isak Aguirre  Date due to report next:  Friday,  or sooner if needed       ZBIGNIEW Johansen  Diabetes and Pregnancy Program

## 2018-04-11 PROCEDURE — 87653 STREP B DNA AMP PROBE: CPT | Performed by: OBSTETRICS & GYNECOLOGY

## 2018-04-11 NOTE — PROGRESS NOTES
[address]: Ms Garo Whitehead was seen today at 36w0d for NST (found under the pregnancy episode) which I reviewed the RN assessment and agree  Please don't hesitate to contact our office with any concerns or questions    Carie Calles MD

## 2018-04-12 ENCOUNTER — LAB REQUISITION (OUTPATIENT)
Dept: LAB | Facility: HOSPITAL | Age: 38
End: 2018-04-12
Payer: COMMERCIAL

## 2018-04-12 DIAGNOSIS — O09.513 SUPERVISION OF ELDERLY PRIMIGRAVIDA IN THIRD TRIMESTER: ICD-10-CM

## 2018-04-14 LAB — GP B STREP DNA SPEC QL NAA+PROBE: NORMAL

## 2018-04-16 ENCOUNTER — TELEPHONE (OUTPATIENT)
Dept: PERINATAL CARE | Facility: CLINIC | Age: 38
End: 2018-04-16

## 2018-04-16 NOTE — TELEPHONE ENCOUNTER
Date:  18  RE: Laura Nguyen    : 1980  Estimated Date of Delivery: 18  EGA: 36w6d  OB/GYN: [facility]      Date Fasting Post-  breakfast Post-  lunch Post-  dinner Before bedtime Carbs Comments   4-11 85 88 97 134      4-12 81 89 89 86      4-13 86 78 89 129      4-14 77 90 85 91      4-15 89 113 104                     Current regimen:  Levemir 24 units at 0730       24 units at [address] (after discussion with ObGyn patient did not increase this dose)  2400 calorie diabetes and pregnancy diet with 3 meals/snacks  Blood sugar testing fasting and 2 hours after meals  Plan:  Continue current regimen  Advised patient if repeating meals that cause blood sugar to be >119 to decrease by 1 carbohydrate and increase by 1 protein serving  Date due to report next:  Thursday, 18    Sincerely,   Moriah Lutz, RN  Diabetes Educator   Diabetes and Pregnancy Program  The diabetic nurse educator note was reviewed by me  I agree with the findings in the note

## 2018-04-17 DIAGNOSIS — O24.414 INSULIN CONTROLLED GESTATIONAL DIABETES MELLITUS (GDM) IN THIRD TRIMESTER: ICD-10-CM

## 2018-04-17 DIAGNOSIS — Z3A.33 33 WEEKS GESTATION OF PREGNANCY: ICD-10-CM

## 2018-04-17 RX ORDER — INSULIN DETEMIR 100 [IU]/ML
INJECTION, SOLUTION SUBCUTANEOUS
Qty: 6 PEN | Refills: 0 | Status: SHIPPED | OUTPATIENT
Start: 2018-04-17 | End: 2018-04-24 | Stop reason: HOSPADM

## 2018-04-19 ENCOUNTER — ULTRASOUND (OUTPATIENT)
Dept: PERINATAL CARE | Facility: CLINIC | Age: 38
End: 2018-04-19
Payer: COMMERCIAL

## 2018-04-19 VITALS
BODY MASS INDEX: 32.72 KG/M2 | HEART RATE: 85 BPM | HEIGHT: 66 IN | DIASTOLIC BLOOD PRESSURE: 61 MMHG | SYSTOLIC BLOOD PRESSURE: 133 MMHG | WEIGHT: 203.6 LBS

## 2018-04-19 DIAGNOSIS — O09.513 ELDERLY PRIMIGRAVIDA IN THIRD TRIMESTER: ICD-10-CM

## 2018-04-19 DIAGNOSIS — O40.3XX0 POLYHYDRAMNIOS IN SINGLETON PREGNANCY IN THIRD TRIMESTER: ICD-10-CM

## 2018-04-19 DIAGNOSIS — Z3A.37 37 WEEKS GESTATION OF PREGNANCY: ICD-10-CM

## 2018-04-19 DIAGNOSIS — O24.414 INSULIN CONTROLLED GESTATIONAL DIABETES MELLITUS IN THIRD TRIMESTER: Primary | ICD-10-CM

## 2018-04-19 PROCEDURE — 76818 FETAL BIOPHYS PROFILE W/NST: CPT | Performed by: OBSTETRICS & GYNECOLOGY

## 2018-04-19 PROCEDURE — 76816 OB US FOLLOW-UP PER FETUS: CPT | Performed by: OBSTETRICS & GYNECOLOGY

## 2018-04-19 PROCEDURE — 99212 OFFICE O/P EST SF 10 MIN: CPT | Performed by: OBSTETRICS & GYNECOLOGY

## 2018-04-19 RX ORDER — VALACYCLOVIR HYDROCHLORIDE 500 MG/1
500 TABLET, FILM COATED ORAL 2 TIMES DAILY
Refills: 1 | COMMUNITY
Start: 2018-04-12 | End: 2018-04-24 | Stop reason: HOSPADM

## 2018-04-19 NOTE — PROGRESS NOTES
Please refer to the [facility] ultrasound report in Ob Procedures for additional information regarding the visit to the [facility]  today

## 2018-04-19 NOTE — PROGRESS NOTES
Non-Stress Testing:    Non-Stress test, equipment, and expected outcomes reviewed  Verified with teach back method  Pt verbalizes +FM  Pt denies ALL:               Leaking of fluid   Contractions   Vaginal bleeding   Decreased fetal movement      Dr Kevin Siddiqi also aware of pt's contractions  Pt states they are not uncomfortable and will notify her doctor if they become more intense and frequent

## 2018-04-20 ENCOUNTER — HOSPITAL ENCOUNTER (OUTPATIENT)
Facility: HOSPITAL | Age: 38
Discharge: HOME | End: 2018-04-20
Attending: OBSTETRICS & GYNECOLOGY | Admitting: OBSTETRICS & GYNECOLOGY
Payer: COMMERCIAL

## 2018-04-20 VITALS
HEIGHT: 66 IN | SYSTOLIC BLOOD PRESSURE: 128 MMHG | WEIGHT: 204 LBS | DIASTOLIC BLOOD PRESSURE: 60 MMHG | BODY MASS INDEX: 32.78 KG/M2 | TEMPERATURE: 97.6 F | RESPIRATION RATE: 16 BRPM | HEART RATE: 77 BPM

## 2018-04-20 PROBLEM — Z3A.37 37 WEEKS GESTATION OF PREGNANCY: Status: ACTIVE | Noted: 2018-04-20

## 2018-04-20 LAB
ABO GROUP BLD: NORMAL
BASOPHILS # BLD AUTO: 0.01 THOUSANDS/ΜL (ref 0–0.1)
BASOPHILS NFR BLD AUTO: 0 % (ref 0–1)
BLD GP AB SCN SERPL QL: NEGATIVE
EOSINOPHIL # BLD AUTO: 0.04 THOUSAND/ΜL (ref 0–0.61)
EOSINOPHIL NFR BLD AUTO: 0 % (ref 0–6)
ERYTHROCYTE [DISTWIDTH] IN BLOOD BY AUTOMATED COUNT: 13.1 % (ref 11.6–15.1)
GLUCOSE SERPL-MCNC: 112 MG/DL (ref 65–140)
GLUCOSE SERPL-MCNC: 117 MG/DL (ref 65–140)
GLUCOSE SERPL-MCNC: 69 MG/DL (ref 65–140)
GLUCOSE SERPL-MCNC: 76 MG/DL (ref 65–140)
GLUCOSE SERPL-MCNC: 77 MG/DL (ref 65–140)
GLUCOSE SERPL-MCNC: 86 MG/DL (ref 65–140)
GLUCOSE SERPL-MCNC: 88 MG/DL (ref 65–140)
HCT VFR BLD AUTO: 34.2 % (ref 34.8–46.1)
HGB BLD-MCNC: 11.4 G/DL (ref 11.5–15.4)
LYMPHOCYTES # BLD AUTO: 1.13 THOUSANDS/ΜL (ref 0.6–4.47)
LYMPHOCYTES NFR BLD AUTO: 8 % (ref 14–44)
MCH RBC QN AUTO: 29.9 PG (ref 26.8–34.3)
MCHC RBC AUTO-ENTMCNC: 33.3 G/DL (ref 31.4–37.4)
MCV RBC AUTO: 90 FL (ref 82–98)
MONOCYTES # BLD AUTO: 0.51 THOUSAND/ΜL (ref 0.17–1.22)
MONOCYTES NFR BLD AUTO: 4 % (ref 4–12)
NEUTROPHILS # BLD AUTO: 11.88 THOUSANDS/ΜL (ref 1.85–7.62)
NEUTS SEG NFR BLD AUTO: 88 % (ref 43–75)
NRBC BLD AUTO-RTO: 0 /100 WBCS
PLATELET # BLD AUTO: 191 THOUSANDS/UL (ref 149–390)
PMV BLD AUTO: 12.2 FL (ref 8.9–12.7)
RBC # BLD AUTO: 3.81 MILLION/UL (ref 3.81–5.12)
RH BLD: POSITIVE
SPECIMEN EXPIRATION DATE: NORMAL
WBC # BLD AUTO: 13.57 THOUSAND/UL (ref 4.31–10.16)

## 2018-04-20 PROCEDURE — 86850 RBC ANTIBODY SCREEN: CPT | Performed by: OBSTETRICS & GYNECOLOGY

## 2018-04-20 PROCEDURE — G0463 HOSPITAL OUTPT CLINIC VISIT: HCPCS

## 2018-04-20 PROCEDURE — 86900 BLOOD TYPING SEROLOGIC ABO: CPT | Performed by: OBSTETRICS & GYNECOLOGY

## 2018-04-20 PROCEDURE — 86901 BLOOD TYPING SEROLOGIC RH(D): CPT | Performed by: OBSTETRICS & GYNECOLOGY

## 2018-04-20 PROCEDURE — 99213 OFFICE O/P EST LOW 20 MIN: CPT

## 2018-04-20 PROCEDURE — 82948 REAGENT STRIP/BLOOD GLUCOSE: CPT

## 2018-04-20 PROCEDURE — 85025 COMPLETE CBC W/AUTO DIFF WBC: CPT | Performed by: OBSTETRICS & GYNECOLOGY

## 2018-04-20 RX ORDER — PROMETHAZINE HYDROCHLORIDE 25 MG/ML
12.5 INJECTION, SOLUTION INTRAMUSCULAR; INTRAVENOUS ONCE
Status: COMPLETED | OUTPATIENT
Start: 2018-04-20 | End: 2018-04-20

## 2018-04-20 RX ORDER — SODIUM CHLORIDE 9 MG/ML
125 INJECTION, SOLUTION INTRAVENOUS CONTINUOUS
Status: DISCONTINUED | OUTPATIENT
Start: 2018-04-20 | End: 2018-04-20 | Stop reason: HOSPADM

## 2018-04-20 RX ORDER — BUTORPHANOL TARTRATE 1 MG/ML
1 INJECTION, SOLUTION INTRAMUSCULAR; INTRAVENOUS ONCE
Status: COMPLETED | OUTPATIENT
Start: 2018-04-20 | End: 2018-04-20

## 2018-04-20 RX ORDER — ONDANSETRON 4 MG/1
4 TABLET, ORALLY DISINTEGRATING ORAL EVERY 6 HOURS PRN
Status: DISCONTINUED | OUTPATIENT
Start: 2018-04-20 | End: 2018-04-20 | Stop reason: HOSPADM

## 2018-04-20 RX ORDER — FAMOTIDINE 20 MG/1
20 TABLET, FILM COATED ORAL DAILY
COMMUNITY
End: 2018-04-24 | Stop reason: HOSPADM

## 2018-04-20 RX ORDER — BUTORPHANOL TARTRATE 1 MG/ML
2 INJECTION, SOLUTION INTRAMUSCULAR; INTRAVENOUS ONCE
Status: COMPLETED | OUTPATIENT
Start: 2018-04-20 | End: 2018-04-20

## 2018-04-20 RX ORDER — PROMETHAZINE HYDROCHLORIDE 25 MG/ML
25 INJECTION, SOLUTION INTRAMUSCULAR; INTRAVENOUS ONCE
Status: COMPLETED | OUTPATIENT
Start: 2018-04-20 | End: 2018-04-20

## 2018-04-20 RX ADMIN — BUTORPHANOL TARTRATE 1 MG: 1 INJECTION, SOLUTION INTRAMUSCULAR; INTRAVENOUS at 11:15

## 2018-04-20 RX ADMIN — PROMETHAZINE HYDROCHLORIDE 12.5 MG: 25 INJECTION INTRAMUSCULAR; INTRAVENOUS at 11:15

## 2018-04-20 RX ADMIN — BUTORPHANOL TARTRATE 2 MG: 1 INJECTION, SOLUTION INTRAMUSCULAR; INTRAVENOUS at 15:23

## 2018-04-20 RX ADMIN — BUTORPHANOL TARTRATE 1 MG: 1 INJECTION, SOLUTION INTRAMUSCULAR; INTRAVENOUS at 08:04

## 2018-04-20 RX ADMIN — SODIUM CHLORIDE 125 ML/HR: 0.9 INJECTION, SOLUTION INTRAVENOUS at 08:00

## 2018-04-20 RX ADMIN — PROMETHAZINE HYDROCHLORIDE 25 MG: 25 INJECTION INTRAMUSCULAR; INTRAVENOUS at 08:04

## 2018-04-20 RX ADMIN — ONDANSETRON 4 MG: 4 TABLET, ORALLY DISINTEGRATING ORAL at 05:52

## 2018-04-20 NOTE — OB LABOR/OXYTOCIN SAFETY PROGRESS
All Woo 45 y o  female MRN: [id number]    Unit/Bed#: L&D 326-01 Encounter: [id number]    Obstetric History       T0      L0     SAB0   TAB0   Ectopic0   Multiple0   Live Births0      Gestational Age: 37w3d     Contraction Frequency (minutes): occasional  Contraction Quality: Moderate  Tachysystole: No   Dilation: 4        Effacement (%): 90  Station: -2  Baseline Rate: 125 bpm     FHR Category: Category I          Notes/comments:       Patient currently states that contractions have now lessened in intensity  No cervical change on exam   Discussed in length with patient that without medical indication we cannot induce her at 37 weeks gestation, in without further cervical change over approximately 8 hours that is unlikely she is currently in labor  Instructed patient not to hesitate to call if she starts to experience contractions that are intensifying or occurring more frequently, vaginal bleeding, leakage of fluid, decreased fetal movement  Reviewed fetal kick counts  Will discharge patient home recommended increased p o  Hydration, Tylenol, heating pads, warm showers, and massage  Patient states that she understands all topics over discussed and is agreeable with the plan      Case discussed with Dr Lauren Candelaria MD 2018 5:47 PM

## 2018-04-20 NOTE — PROGRESS NOTES
Triage Note - OB  Christian Sport 45 y o  female MRN: [id number], : 1980  Pt of [facility] (Dr Elva Turner)  Unit/Bed#: L&D 326-01 Encounter: [id number]    Chief Complaint: ctxs    Subjective:    HPI: 45 y o  G1  at 37w3d with c/o contractions  neg vaginal bleeding   neg loss of fluid   pos fetal movement     Objective:      GBS: neg  Blood type: O+  Estimated Date of Delivery: 18    Vitals:        FHR baseline: 130s  FHR variability: moderate  NST: reactive     Evaluate for Labor >34 wks: Subjective:   Pt c/o ctxs    Objective: In-office exam on 18 was 1 cm   @ 0520 SVE: 1/80/-2, [place]: q 3-5 mins , w/nurse chaperone Bety in room, Pain 7/10      Assessment:  45 y o  female G1 at 44w3d with c/o ctxs    Plan:   Will sign out recheck to day team    Samra Heller MD, Resident - OB-GYN  2018 5:17 AM

## 2018-04-20 NOTE — OB LABOR/OXYTOCIN SAFETY PROGRESS
Labor Progress Note - Valentin Hanley 45 y o  female MRN: [id number]    Unit/Bed#: L&D 326-01 Encounter: [id number]    Obstetric History       T0      L0     SAB0   TAB0   Ectopic0   Multiple0   Live Births0      Gestational Age: 37w3d     Contraction Frequency (minutes): 5-6  Contraction Quality: Mild  Tachysystole: No   Dilation: 3        Effacement (%): 90  Station: -2  Baseline Rate: 115 bpm     FHR Category: Category I          Notes/comments:   S/p Stadol/Phenergan, reports improved pain with contractions after medication but now starting to increase again as pain medications wears off  Sly every 5-6 minutes  Category I FHT  SVE with minimal change from prior examination, 3/90/-2, early labor  Patient will ambulate, possibly for 2nd therapeutic rest, then reassess cervix for change  GBS negative, no PCN indicated    Dr Lucero Gamez aware            Dayanna Liang MD 2018 9:52 AM

## 2018-04-20 NOTE — OB LABOR/OXYTOCIN SAFETY PROGRESS
Labor Progress Note - Danyel Man 45 y o  female MRN: [id number]    Unit/Bed#: L&D 326-01 Encounter: [id number]    Obstetric History       T0      L0     SAB0   TAB0   Ectopic0   Multiple0   Live Births0      Gestational Age: 37w3d     Contraction Frequency (minutes): 4  Contraction Quality: Strong  Tachysystole: No   Dilation: 2-3        Effacement (%): 80  Station: -2  Baseline Rate: 115 bpm     FHR Category: Category I          Notes/comments:   Patient uncomfortable with contractions, barbara every 4 minutes  SVE changed to 2-3/80/-2, intact membranes  Category I FHT, baseline change to 115    Early labor @ 37w3d  Therapeutic rest w/ Stadol and Phenergan and recheck  GBS negative    Discussed w/ Dr Nicolette Jain MD 2018 7:45 AM

## 2018-04-20 NOTE — OB LABOR/OXYTOCIN SAFETY PROGRESS
Labor Progress Note - Oskar Santiago 45 y o  female MRN: [id number]    Unit/Bed#: L&D 326-01 Encounter: [id number]    Obstetric History       T0      L0     SAB0   TAB0   Ectopic0   Multiple0   Live Births0      Gestational Age: 37w3d     Contraction Frequency (minutes): 3-5  Contraction Quality: Moderate  Tachysystole: No   Dilation: 4        Effacement (%): 90  Station: -2  Baseline Rate: 115 bpm     FHR Category: Category I          Notes/comments:   Patient more uncomfortable with contractions  Sly every 3-5 minutes   Category I FHT  SVE /-2  Will administer Stadol/Phenergan    Early labor  A2GDM  GBS negative      Dr Regina Lopez MD 2018 10:42 AM

## 2018-04-20 NOTE — OB LABOR/OXYTOCIN SAFETY PROGRESS
Labor Progress Note - Mulu Maryam 45 y o  female MRN: [id number]    Unit/Bed#: L&D 326-01 Encounter: [id number]    Obstetric History       T0      L0     SAB0   TAB0   Ectopic0   Multiple0   Live Births0      Gestational Age: 37w3d     Contraction Frequency (minutes): 5-6  Contraction Quality: Moderate  Tachysystole: No   Dilation: 4        Effacement (%): 90  Station: -2  Baseline Rate: 125 bpm     FHR Category: Category I          Notes/comments:     She is extremely uncomfortable but has not made cervical change  She is in early labor  2mg stadol now   Will continue therapeutic rest     D/w Dr Jadyn George MD 2018 2:36 PM

## 2018-04-20 NOTE — OB LABOR/OXYTOCIN SAFETY PROGRESS
Labor Progress Note - Mulu Francisco 45 y o  female MRN: [id number]    Unit/Bed#: L&D 326-01 Encounter: [id number]    Obstetric History       T0      L0     SAB0   TAB0   Ectopic0   Multiple0   Live Births0      Gestational Age: 37w3d     Contraction Frequency (minutes): 5-6  Contraction Quality: Moderate  Tachysystole: No   Dilation: 4        Effacement (%): 90  Station: -2  Baseline Rate: 125 bpm     FHR Category: Category I          Notes/comments:   Patient reports increased pain with contractions as medications are wearing off  Sly irregularly every 3-4 minutes  SVE unchanged from prior, /  Discussed discharge home with patient, however due to pain she declines at this time  Patient encouraged to eat lunch, walk and possibly use jacuzzi for relief     Will re-evaluate in 2 hours to assess for cervical change    Dr Jordan Ano aware            Isac Alpers, MD 2018

## 2018-04-21 ENCOUNTER — ANESTHESIA (INPATIENT)
Dept: LABOR AND DELIVERY | Facility: HOSPITAL | Age: 38
End: 2018-04-21
Payer: COMMERCIAL

## 2018-04-21 ENCOUNTER — HOSPITAL ENCOUNTER (INPATIENT)
Facility: HOSPITAL | Age: 38
LOS: 3 days | Discharge: HOME | End: 2018-04-24
Attending: OBSTETRICS & GYNECOLOGY | Admitting: OBSTETRICS & GYNECOLOGY
Payer: COMMERCIAL

## 2018-04-21 ENCOUNTER — ANESTHESIA EVENT (INPATIENT)
Dept: LABOR AND DELIVERY | Facility: HOSPITAL | Age: 38
End: 2018-04-21
Payer: COMMERCIAL

## 2018-04-21 DIAGNOSIS — Z3A.37 37 WEEKS GESTATION OF PREGNANCY: Primary | ICD-10-CM

## 2018-04-21 PROBLEM — K21.9 GERD (GASTROESOPHAGEAL REFLUX DISEASE): Status: ACTIVE | Noted: 2017-05-09

## 2018-04-21 PROBLEM — I10 ESSENTIAL HYPERTENSION: Status: ACTIVE | Noted: 2017-05-09

## 2018-04-21 PROBLEM — A60.00 HERPES, GENITAL: Status: ACTIVE | Noted: 2017-08-18

## 2018-04-21 LAB
BASOPHILS # BLD AUTO: 0.01 THOUSANDS/ΜL (ref 0–0.1)
BASOPHILS NFR BLD AUTO: 0 % (ref 0–1)
EOSINOPHIL # BLD AUTO: 0.07 THOUSAND/ΜL (ref 0–0.61)
EOSINOPHIL NFR BLD AUTO: 1 % (ref 0–6)
ERYTHROCYTE [DISTWIDTH] IN BLOOD BY AUTOMATED COUNT: 13.3 % (ref 11.6–15.1)
GLUCOSE SERPL-MCNC: 68 MG/DL (ref 65–140)
GLUCOSE SERPL-MCNC: 68 MG/DL (ref 65–140)
GLUCOSE SERPL-MCNC: 70 MG/DL (ref 65–140)
GLUCOSE SERPL-MCNC: 71 MG/DL (ref 65–140)
GLUCOSE SERPL-MCNC: 72 MG/DL (ref 65–140)
GLUCOSE SERPL-MCNC: 84 MG/DL (ref 65–140)
GLUCOSE SERPL-MCNC: 87 MG/DL (ref 65–140)
GLUCOSE SERPL-MCNC: 90 MG/DL (ref 65–140)
GLUCOSE SERPL-MCNC: 92 MG/DL (ref 65–140)
HCT VFR BLD AUTO: 32.8 % (ref 34.8–46.1)
HGB BLD-MCNC: 10.9 G/DL (ref 11.5–15.4)
LYMPHOCYTES # BLD AUTO: 1.25 THOUSANDS/ΜL (ref 0.6–4.47)
LYMPHOCYTES NFR BLD AUTO: 9 % (ref 14–44)
MCH RBC QN AUTO: 29.8 PG (ref 26.8–34.3)
MCHC RBC AUTO-ENTMCNC: 33.2 G/DL (ref 31.4–37.4)
MCV RBC AUTO: 90 FL (ref 82–98)
MONOCYTES # BLD AUTO: 0.72 THOUSAND/ΜL (ref 0.17–1.22)
MONOCYTES NFR BLD AUTO: 5 % (ref 4–12)
NEUTROPHILS # BLD AUTO: 11.84 THOUSANDS/ΜL (ref 1.85–7.62)
NEUTS SEG NFR BLD AUTO: 85 % (ref 43–75)
NRBC BLD AUTO-RTO: 0 /100 WBCS
PLATELET # BLD AUTO: 218 THOUSANDS/UL (ref 149–390)
PMV BLD AUTO: 12.7 FL (ref 8.9–12.7)
RBC # BLD AUTO: 3.66 MILLION/UL (ref 3.81–5.12)
WBC # BLD AUTO: 13.89 THOUSAND/UL (ref 4.31–10.16)

## 2018-04-21 PROCEDURE — G0463 HOSPITAL OUTPT CLINIC VISIT: HCPCS

## 2018-04-21 PROCEDURE — 86592 SYPHILIS TEST NON-TREP QUAL: CPT | Performed by: OBSTETRICS & GYNECOLOGY

## 2018-04-21 PROCEDURE — 99211 OFF/OP EST MAY X REQ PHY/QHP: CPT

## 2018-04-21 PROCEDURE — 82948 REAGENT STRIP/BLOOD GLUCOSE: CPT

## 2018-04-21 PROCEDURE — 85025 COMPLETE CBC W/AUTO DIFF WBC: CPT | Performed by: OBSTETRICS & GYNECOLOGY

## 2018-04-21 RX ORDER — OXYTOCIN/RINGER'S LACTATE 30/500 ML
PLASTIC BAG, INJECTION (ML) INTRAVENOUS
Status: COMPLETED
Start: 2018-04-21 | End: 2018-04-22

## 2018-04-21 RX ORDER — SODIUM CHLORIDE 9 MG/ML
125 INJECTION, SOLUTION INTRAVENOUS CONTINUOUS
Status: DISCONTINUED | OUTPATIENT
Start: 2018-04-21 | End: 2018-04-22

## 2018-04-21 RX ORDER — PROMETHAZINE HYDROCHLORIDE 25 MG/ML
25 INJECTION, SOLUTION INTRAMUSCULAR; INTRAVENOUS EVERY 6 HOURS PRN
Status: DISCONTINUED | OUTPATIENT
Start: 2018-04-21 | End: 2018-04-21

## 2018-04-21 RX ORDER — ONDANSETRON 4 MG/1
4 TABLET, ORALLY DISINTEGRATING ORAL EVERY 6 HOURS PRN
Status: DISCONTINUED | OUTPATIENT
Start: 2018-04-21 | End: 2018-04-21

## 2018-04-21 RX ORDER — METOCLOPRAMIDE HYDROCHLORIDE 5 MG/ML
10 INJECTION INTRAMUSCULAR; INTRAVENOUS EVERY 6 HOURS PRN
Status: DISCONTINUED | OUTPATIENT
Start: 2018-04-21 | End: 2018-04-22

## 2018-04-21 RX ORDER — VALACYCLOVIR HYDROCHLORIDE 500 MG/1
500 TABLET, FILM COATED ORAL 2 TIMES DAILY
Status: DISCONTINUED | OUTPATIENT
Start: 2018-04-21 | End: 2018-04-22

## 2018-04-21 RX ORDER — BUTORPHANOL TARTRATE 1 MG/ML
2 INJECTION, SOLUTION INTRAMUSCULAR; INTRAVENOUS ONCE
Status: COMPLETED | OUTPATIENT
Start: 2018-04-21 | End: 2018-04-21

## 2018-04-21 RX ORDER — ROPIVACAINE HYDROCHLORIDE 2 MG/ML
INJECTION, SOLUTION EPIDURAL; INFILTRATION; PERINEURAL
Status: COMPLETED
Start: 2018-04-21 | End: 2018-04-21

## 2018-04-21 RX ORDER — ONDANSETRON 2 MG/ML
4 INJECTION INTRAMUSCULAR; INTRAVENOUS EVERY 6 HOURS PRN
Status: DISCONTINUED | OUTPATIENT
Start: 2018-04-21 | End: 2018-04-22

## 2018-04-21 RX ORDER — CHLOROPROCAINE HYDROCHLORIDE 30 MG/ML
INJECTION, SOLUTION EPIDURAL; INFILTRATION; INTRACAUDAL; PERINEURAL AS NEEDED
Status: DISCONTINUED | OUTPATIENT
Start: 2018-04-21 | End: 2018-04-22 | Stop reason: SURG

## 2018-04-21 RX ORDER — SERTRALINE HYDROCHLORIDE 100 MG/1
100 TABLET, FILM COATED ORAL DAILY
Status: DISCONTINUED | OUTPATIENT
Start: 2018-04-21 | End: 2018-04-24 | Stop reason: HOSPADM

## 2018-04-21 RX ORDER — ROPIVACAINE HYDROCHLORIDE 2 MG/ML
INJECTION, SOLUTION EPIDURAL; INFILTRATION; PERINEURAL AS NEEDED
Status: DISCONTINUED | OUTPATIENT
Start: 2018-04-21 | End: 2018-04-22 | Stop reason: SURG

## 2018-04-21 RX ADMIN — SERTRALINE HYDROCHLORIDE 100 MG: 100 TABLET ORAL at 11:11

## 2018-04-21 RX ADMIN — SODIUM CHLORIDE 999 ML/HR: 0.9 INJECTION, SOLUTION INTRAVENOUS at 11:25

## 2018-04-21 RX ADMIN — VALACYCLOVIR HYDROCHLORIDE 500 MG: 500 TABLET, FILM COATED ORAL at 20:22

## 2018-04-21 RX ADMIN — ONDANSETRON 4 MG: 4 TABLET, ORALLY DISINTEGRATING ORAL at 08:59

## 2018-04-21 RX ADMIN — ROPIVACAINE HYDROCHLORIDE 100 ML: 2 INJECTION, SOLUTION EPIDURAL; INFILTRATION at 12:22

## 2018-04-21 RX ADMIN — ONDANSETRON 4 MG: 2 INJECTION INTRAMUSCULAR; INTRAVENOUS at 21:02

## 2018-04-21 RX ADMIN — VALACYCLOVIR HYDROCHLORIDE 500 MG: 500 TABLET, FILM COATED ORAL at 11:12

## 2018-04-21 RX ADMIN — PROMETHAZINE HYDROCHLORIDE 25 MG: 25 INJECTION INTRAMUSCULAR; INTRAVENOUS at 08:54

## 2018-04-21 RX ADMIN — ONDANSETRON 4 MG: 2 INJECTION INTRAMUSCULAR; INTRAVENOUS at 15:37

## 2018-04-21 RX ADMIN — SODIUM CHLORIDE 125 ML/HR: 0.9 INJECTION, SOLUTION INTRAVENOUS at 20:32

## 2018-04-21 RX ADMIN — BUTORPHANOL TARTRATE 2 MG: 1 INJECTION, SOLUTION INTRAMUSCULAR; INTRAVENOUS at 08:51

## 2018-04-21 RX ADMIN — ROPIVACAINE HYDROCHLORIDE: 2 INJECTION, SOLUTION EPIDURAL; INFILTRATION at 18:46

## 2018-04-21 RX ADMIN — SODIUM CHLORIDE 125 ML/HR: 0.9 INJECTION, SOLUTION INTRAVENOUS at 12:51

## 2018-04-21 RX ADMIN — CHLOROPROCAINE HYDROCHLORIDE 6 ML: 30 INJECTION, SOLUTION EPIDURAL; INFILTRATION at 23:54

## 2018-04-21 RX ADMIN — ROPIVACAINE HYDROCHLORIDE: 2 INJECTION, SOLUTION EPIDURAL; INFILTRATION at 23:52

## 2018-04-21 RX ADMIN — SODIUM CHLORIDE 125 ML/HR: 0.9 INJECTION, SOLUTION INTRAVENOUS at 08:42

## 2018-04-21 RX ADMIN — ROPIVACAINE HYDROCHLORIDE 5 ML: 2 INJECTION, SOLUTION EPIDURAL; INFILTRATION at 17:03

## 2018-04-21 NOTE — ANESTHESIA PREPROCEDURE EVALUATION
Review of Systems/Medical History  Patient summary reviewed        Cardiovascular  Negative cardio ROS Hypertension ,    Pulmonary  Negative pulmonary ROS        GI/Hepatic  Negative GI/hepatic ROS   GERD ,        Negative  ROS        Endo/Other  Negative endo/other ROS Diabetes well controlled ,      GYN  Negative gynecology ROS          Hematology  Negative hematology ROS Anemia ,     Musculoskeletal  Negative musculoskeletal ROS        Neurology  Negative neurology ROS      Psychology   Negative psychology ROS Anxiety, Depression ,              Physical Exam    Airway    Mallampati score: II  TM Distance: >3 FB  Neck ROM: full     Dental   No notable dental hx     Cardiovascular  Comment: Negative ROS, Rhythm: regular, Rate: normal, Cardiovascular exam normal    Pulmonary  Pulmonary exam normal Breath sounds clear to auscultation,     Other Findings        Anesthesia Plan  ASA Score- 3     Anesthesia Type- epidural with ASA Monitors  Additional Monitors:   Airway Plan:         Plan Factors-    Induction- intravenous  Postoperative Plan-     Informed Consent- Anesthetic plan and risks discussed with patient

## 2018-04-21 NOTE — OB LABOR/OXYTOCIN SAFETY PROGRESS
Labor Progress Note - Midge Furnace 45 y o  female MRN: [id number]    Unit/Bed#: L&D 326-01 Encounter: [id number]    Obstetric History       T0      L0     SAB0   TAB0   Ectopic0   Multiple0   Live Births0      Gestational Age: 37w4d     Contraction Frequency (minutes): 3 (pt  on her side - TOCO not showing contractions)  Contraction Quality: Moderate  Tachysystole: No   Dilation: 9        Effacement (%): 100  Station: 0  Baseline Rate: 125 bpm  Fetal Heart Rate: 130 BPM  FHR Category: Category I          Notes/comments:     Continue expectant management  CAT I tracing    D/w Dr Elsa Hays MD 2018 7:13 PM

## 2018-04-21 NOTE — H&P
H&P Exam - Obstetrics   Ana Haque 45 y o  female MRN: [id number]  Unit/Bed#: L&D 326-01 Encounter: [id number]      History of Present Illness     Chief Complaint: Active labor    HPI:  Ana Haque is a 45 y o   female with an HARSHIL of 2018 at 37w4d weeks gestation who is being admitted for labor  She was evaluated in triage yesterday and made change from 1/80/-2 to 4/80/-2  However, she stalled out at 4 cm for 7 hours and was discharged home after therapeutic rest   She returned to triage this morning at 0730 for continued painful contractions  Initial check was 4-5/100/-2 and 2 hour check was 6/100/-1  Due to cervical change, she was admitted for labor  Contractions: Present  Loss of fluid: Absent  Vaginal bleeding: Absent  Fetal movement: Present    She is a [facility] patient  PREGNANCY COMPLICATIONS:   1) Z2GVB, on 28U levemir AM and PM  2) Herpes, on valtrex  3) Advanced maternal age  3) Polyhydramnios    OB History    Para Term  AB Living   1 0 0 0 0 0   SAB TAB Ectopic Multiple Live Births                  # Outcome Date GA Lbr Eros/2nd Weight Sex Delivery Anes PTL Lv   1 Current                   Baby complications/comments: EFW on  7lb8oz (76%), AC > 95%, ILSA 25 5cm    Review of Systems   Constitutional: Negative  Respiratory: Negative  Cardiovascular: Negative  Gastrointestinal: Negative  Genitourinary: Positive for pelvic pain  Musculoskeletal: Negative  Historical Information   Past Medical History:   Diagnosis Date    Abnormal Pap smear of cervix     ? HPV    Anemia     during pregnancy    Anxiety     currently medicated    Depression     currently medicated    Diabetes mellitus ([facility] [address] )     A2 GDM    Hypertension     Migraine     prior to pregnancy    Psychiatric disorder     Visual impairment     wears corrective lenses     Past Surgical History:   Procedure Laterality Date    ABDOMINAL SURGERY      CERVICAL BIOPSY  W/ LOOP ELECTRODE EXCISION      2008     Social History   History   Alcohol Use No     History   Drug Use No     History   Smoking Status    Never Smoker   Smokeless Tobacco    Never Used     Family History: non-contributory    Meds/Allergies      Prescriptions Prior to Admission   Medication    BD PEN NEEDLE MAX U/F 32G X 4 MM MISC    famotidine (PEPCID) 20 mg tablet    iron polysaccharides (NIFEREX) 150 mg capsule    LEVEMIR FLEXTOUCH subcutaneous injection pen 100 units/mL    ondansetron (ZOFRAN) 24 MG tablet    Prenatal Vit-Fe Fum-FA-Omega (ONE-A-DAY WOMENS PRENATAL PO)    sertraline (ZOLOFT) 50 mg tablet    valACYclovir (VALTREX) 500 mg tablet      No Known Allergies    OBJECTIVE:    Vitals: Blood pressure 134/79, pulse (!) 111, temperature 98 1 °F (36 7 °C), temperature source Oral, resp  rate 18, height 5' 6" (1 676 m), weight 92 5 kg (204 lb), last menstrual period 07/13/2017, currently breastfeeding  Body mass index is 32 93 kg/m²  Physical Exam   Constitutional: She is oriented to person, place, and time  She appears well-developed and well-nourished  Cardiovascular: Normal rate, regular rhythm and normal heart sounds  Pulmonary/Chest: Effort normal and breath sounds normal    Abdominal: Soft  Bowel sounds are normal    Neurological: She is alert and oriented to person, place, and time  Vitals reviewed        Cervix:  Dilation: 6  Effacement (%): 100  Station: -1    Fetal heart rate:   Baseline Rate: 135 bpm  Variability: Moderate 6-25 bpm  Accelerations: 15 x 15 or greater, At variable times  Decelerations: None  FHR Category: Category I    [place]:   Contraction Frequency (minutes): 3-7  Contraction Duration (seconds):   Contraction Quality: Moderate    EFW: 7 5    GBS: Negative    Prenatal Labs:   Blood Type:   Lab Results   Component Value Date/Time    ABO Grouping O 04/20/2018 08:10 AM    ABO Grouping O 09/09/2017     , D (Rh type):   Lab Results   Component Value Date/Time    Rh Factor Positive 2018 08:10 AM     , Antibody Screen:   Lab Results   Component Value Date/Time    Antibody Screen Negative 2018 08:10 AM    , HCT/HGB:   Lab Results   Component Value Date/Time    Hematocrit 34 2 (L) 2018 08:10 AM    Hemoglobin 11 4 (L) 2018 08:10 AM      , MCV:   Lab Results   Component Value Date/Time    MCV 90 2018 08:10 AM      , Platelets:   Lab Results   Component Value Date/Time    Platelets 762  08:10 AM      , 1 hour Glucola:   Lab Results   Component Value Date/Time    Glucose 159 02/10/2018   , 3 hour GTT:   Lab Results   Component Value Date/Time    Glucose, GTT - 3 Hour 77 2018   ,Urine Culture/Screen:   Lab Results   Component Value Date/Time    Urine Culture 20,000-29,000 cfu/ml  2017 08:18 PM      Hep B:   Lab Results   Component Value Date/Time    External Hepatitis B Surface Ag neg 10/14/2017     HIV:   Lab Results   Component Value Date/Time    External HIV-1 Antibody neg 10/14/2017     , Chlamydia:   Lab Results   Component Value Date/Time    External Chlamydia Screen neg 10/24/2017     , Gonorrhea:   Lab Results   Component Value Date/Time    External Gonorrhea Screen neg 10/24/2017     , Group B Strep:    Lab Results   Component Value Date/Time    Strep Grp B PCR Negative for Beta Hemolytic Strep Grp B by PCR 2018 04:30 PM          Invasive Devices     Peripheral Intravenous Line            Peripheral IV 18 Right Arm less than 1 day                  Assessment/Plan     ASSESSMENT:  39yo  at 37w4d weeks gestation who is being admitted for labor  PLAN:   1) Admit   2) CBC, RPR, Blood Type   3) Expectant management   4) A2GDM: q1hr glucose checks x4, then q2hr until delivery  SSI as needed     5) GBS negative status: no PCN for prophylaxis    6) Analgesia and/or epidural at patient request   7) Anticipate    8) Discussed with Dr Dario Devlin      This patient will be an INPATIENT  and I certify the anticipated length of stay is >2 Midnights      Apoorva Caldera MD  4/21/2018  10:59 AM

## 2018-04-21 NOTE — ANESTHESIA PROCEDURE NOTES
Epidural Block    Patient location during procedure: OB  Start time: 4/21/2018 12:21 PM  Reason for block: procedure for pain, at surgeon's request and primary anesthetic  Staffing  Anesthesiologist: Kartik Matos  Performed: anesthesiologist   Preanesthetic Checklist  Completed: patient identified, site marked, surgical consent, pre-op evaluation, timeout performed, IV checked, risks and benefits discussed and monitors and equipment checked  Epidural  Patient position: sitting  Prep: ChloraPrep  Patient monitoring: cardiac monitor and frequent blood pressure checks  Approach: midline  Location: lumbar (1-5)  Injection technique: CARY air  Needle  Needle type: Tuohy   Needle gauge: 18 G  Catheter type: side hole  Catheter size: 20 G  Test dose: negative and lidocaine 1 5% with epinephrine 1-to-200,000negative aspiration for CSF, negative aspiration for heme and no paresthesia on injection  patient tolerated the procedure well with no immediate complications

## 2018-04-21 NOTE — OB LABOR/OXYTOCIN SAFETY PROGRESS
Labor Progress Note - Humera Davis 45 y o  female MRN: [id number]    Unit/Bed#: L&D 326-01 Encounter: [id number]    Obstetric History       T0      L0     SAB0   TAB0   Ectopic0   Multiple0   Live Births0      Gestational Age: 37w4d     Contraction Frequency (minutes): 2-4  Contraction Quality: Moderate  Tachysystole: No   Dilation: 7        Effacement (%): 100  Station: -1  Baseline Rate: 130 bpm  Fetal Heart Rate: 130 BPM  FHR Category: Category I          Notes/comments:         Expectant managment    Payton Hashimoto, DO 2018 3:48 PM

## 2018-04-22 LAB
BASE EXCESS BLDCOA CALC-SCNC: -8.4 MMOL/L (ref 3–11)
BASE EXCESS BLDCOV CALC-SCNC: -9.2 MMOL/L (ref 1–9)
HCO3 BLDCOA-SCNC: 20.7 MMOL/L (ref 17.3–27.3)
HCO3 BLDCOV-SCNC: 17.1 MMOL/L (ref 12.2–28.6)
O2 CT VFR BLDCOA CALC: 8.2 ML/DL
OXYHGB MFR BLDCOA: 31.6 %
OXYHGB MFR BLDCOV: 54.1 %
PCO2 BLDCOA: 56 MM[HG] (ref 30–60)
PCO2 BLDCOV: 38.7 MM HG (ref 27–43)
PH BLDCOA: 7.19 [PH] (ref 7.23–7.43)
PH BLDCOV: 7.26 [PH] (ref 7.19–7.49)
PO2 BLDCOA: 17.9 MM HG (ref 5–25)
PO2 BLDCOV: 24.7 MM HG (ref 15–45)
SAO2 % BLDCOV: 14.4 ML/DL

## 2018-04-22 PROCEDURE — 0HQ9XZZ REPAIR PERINEUM SKIN, EXTERNAL APPROACH: ICD-10-PCS | Performed by: OBSTETRICS & GYNECOLOGY

## 2018-04-22 PROCEDURE — 82805 BLOOD GASES W/O2 SATURATION: CPT | Performed by: OBSTETRICS & GYNECOLOGY

## 2018-04-22 RX ORDER — DIPHENHYDRAMINE HCL 25 MG
25 TABLET ORAL EVERY 6 HOURS PRN
Status: DISCONTINUED | OUTPATIENT
Start: 2018-04-22 | End: 2018-04-24 | Stop reason: HOSPADM

## 2018-04-22 RX ORDER — DOCUSATE SODIUM 100 MG/1
100 CAPSULE, LIQUID FILLED ORAL 2 TIMES DAILY
Status: DISCONTINUED | OUTPATIENT
Start: 2018-04-22 | End: 2018-04-24 | Stop reason: HOSPADM

## 2018-04-22 RX ORDER — IBUPROFEN 600 MG/1
600 TABLET ORAL EVERY 4 HOURS PRN
Status: DISCONTINUED | OUTPATIENT
Start: 2018-04-22 | End: 2018-04-24 | Stop reason: HOSPADM

## 2018-04-22 RX ORDER — OXYCODONE HYDROCHLORIDE AND ACETAMINOPHEN 5; 325 MG/1; MG/1
1 TABLET ORAL EVERY 4 HOURS PRN
Status: DISCONTINUED | OUTPATIENT
Start: 2018-04-22 | End: 2018-04-24 | Stop reason: HOSPADM

## 2018-04-22 RX ORDER — SIMETHICONE 80 MG
80 TABLET,CHEWABLE ORAL 4 TIMES DAILY PRN
Status: DISCONTINUED | OUTPATIENT
Start: 2018-04-22 | End: 2018-04-24 | Stop reason: HOSPADM

## 2018-04-22 RX ORDER — MAGNESIUM HYDROXIDE/ALUMINUM HYDROXICE/SIMETHICONE 120; 1200; 1200 MG/30ML; MG/30ML; MG/30ML
15 SUSPENSION ORAL EVERY 6 HOURS PRN
Status: DISCONTINUED | OUTPATIENT
Start: 2018-04-22 | End: 2018-04-24 | Stop reason: HOSPADM

## 2018-04-22 RX ORDER — PANTOPRAZOLE SODIUM 40 MG/1
40 TABLET, DELAYED RELEASE ORAL
Status: DISCONTINUED | OUTPATIENT
Start: 2018-04-22 | End: 2018-04-24 | Stop reason: HOSPADM

## 2018-04-22 RX ORDER — CALCIUM CARBONATE 200(500)MG
1000 TABLET,CHEWABLE ORAL DAILY PRN
Status: DISCONTINUED | OUTPATIENT
Start: 2018-04-22 | End: 2018-04-24 | Stop reason: HOSPADM

## 2018-04-22 RX ORDER — ONDANSETRON 2 MG/ML
4 INJECTION INTRAMUSCULAR; INTRAVENOUS EVERY 8 HOURS PRN
Status: DISCONTINUED | OUTPATIENT
Start: 2018-04-22 | End: 2018-04-24 | Stop reason: HOSPADM

## 2018-04-22 RX ORDER — ACETAMINOPHEN 325 MG/1
650 TABLET ORAL EVERY 4 HOURS PRN
Status: DISCONTINUED | OUTPATIENT
Start: 2018-04-22 | End: 2018-04-24 | Stop reason: HOSPADM

## 2018-04-22 RX ORDER — OXYTOCIN/RINGER'S LACTATE 30/500 ML
250 PLASTIC BAG, INJECTION (ML) INTRAVENOUS CONTINUOUS
Status: ACTIVE | OUTPATIENT
Start: 2018-04-22 | End: 2018-04-22

## 2018-04-22 RX ORDER — OXYCODONE HYDROCHLORIDE AND ACETAMINOPHEN 5; 325 MG/1; MG/1
2 TABLET ORAL EVERY 4 HOURS PRN
Status: DISCONTINUED | OUTPATIENT
Start: 2018-04-22 | End: 2018-04-24 | Stop reason: HOSPADM

## 2018-04-22 RX ORDER — DIAPER,BRIEF,INFANT-TODD,DISP
1 EACH MISCELLANEOUS AS NEEDED
Status: DISCONTINUED | OUTPATIENT
Start: 2018-04-22 | End: 2018-04-24 | Stop reason: HOSPADM

## 2018-04-22 RX ADMIN — IBUPROFEN 600 MG: 600 TABLET ORAL at 18:15

## 2018-04-22 RX ADMIN — WITCH HAZEL 1 PAD: 500 SOLUTION RECTAL; TOPICAL at 04:35

## 2018-04-22 RX ADMIN — IBUPROFEN 600 MG: 600 TABLET ORAL at 12:32

## 2018-04-22 RX ADMIN — Medication 30 UNITS: at 01:53

## 2018-04-22 RX ADMIN — BENZOCAINE AND LEVOMENTHOL 1 APPLICATION: 200; 5 SPRAY TOPICAL at 04:35

## 2018-04-22 RX ADMIN — IBUPROFEN 600 MG: 600 TABLET ORAL at 04:35

## 2018-04-22 RX ADMIN — SODIUM CHLORIDE 125 ML/HR: 0.9 INJECTION, SOLUTION INTRAVENOUS at 01:02

## 2018-04-22 RX ADMIN — DOCUSATE SODIUM 100 MG: 100 CAPSULE, LIQUID FILLED ORAL at 09:23

## 2018-04-22 RX ADMIN — SERTRALINE HYDROCHLORIDE 100 MG: 100 TABLET ORAL at 09:23

## 2018-04-22 RX ADMIN — CHLOROPROCAINE HYDROCHLORIDE 10 ML: 30 INJECTION, SOLUTION EPIDURAL; INFILTRATION at 01:04

## 2018-04-22 RX ADMIN — DOCUSATE SODIUM 100 MG: 100 CAPSULE, LIQUID FILLED ORAL at 18:13

## 2018-04-22 RX ADMIN — PANTOPRAZOLE SODIUM 40 MG: 40 TABLET, DELAYED RELEASE ORAL at 12:29

## 2018-04-22 NOTE — PROGRESS NOTES
Patient complains of heavy feeling in chest, Dr Kellie Carroll notified and to patient room to assess her at this time

## 2018-04-22 NOTE — PROGRESS NOTES
Postpartum Day #0 VAVD c/o intermittent chest tightness  She reports a history of a hiatal hernia and GERD and that this chest discomfort feels like her GERD symptoms  She denies shortness of breath or other associated Sx     Physical exam WNL, Vitals stable    Likely GERD vs musculoskeletal chest pain  Will order Protonix & Aqua K   Will order EKG to rule out cardiac etiology    Discussed w/ Dr Eusebio Mills

## 2018-04-22 NOTE — L&D DELIVERY NOTE
Vacuum Assisted Delivery Summary - OB/GYN   Jarrett Bray 45 y o  female MRN: [id number]  Unit/Bed#: L&D 326-01 Encounter: [id number]          Predelivery Diagnosis:  1  Pregnancy at 37w4d  2  A2GDM (28U levemir AM/PM)  3  Herpes, on valtrex  4  Advanced maternal age  11  Polyhydramnios     Postdelivery Diagnosis:  1  Same as above  2  Delivery of term     Procedure: Vacuum Assisted Vaginal Delivery     Attending: Dr Cheryle Moreno    Assistant: Anitha Richter    Anesthesia: Epidural    EBL: 200cc  Admission Hg: 10 9  Admission platelets: 332    Complications: none apparent    Specimens: cord blood, arterial and venous cord blood gasses, placenta to storage    Findings:   1  Viable female at 0145, with APGARS of 5 and 9 at 1 and 5 minutes respectively,  2  Spontaneous delivery of intact placenta at 0154  3  1 degree laceration repaired with 3-0 vicryl rapide  4  Blood gases:   Arterial pH: 7 186   Arterial base excess: -8 4   Venous pH: 7 262   Venous base excess: -9 2    Disposition:  Patient tolerated the procedure well and was recovering in labor and delivery room     Brief history and labor course:  Ms Jrarett Bray is a 45 y o   at 37wk4d who was admitted for labor  She was expectantly managed and was artificially ruptured for augmentation  She received an epidural for pain control  She became complete at 10:49 p m  and started pushing at 10:50 p m  Onzoan Cassette She then proceeded to push for 2 hours and 50 minutes  Due to maternal exhaustion and nonreassuring fetal heart tones with recurrent variable decelerations, recommendations were made for vacuum assisted vaginal delivery  Informed consent: The patient was informed of the risks and benefits of the procedure  Risks included but weren't limited to bleeding, infection, injury to then vaginal, cervix, urethra, bladder, rectum, and perineum    The patient was counseled on fetal risks including scalp laceration, bruising, subgaleal hematoma, cephalohematoma, intracranial hematoma,  jaundice and competitions of shoulder dystocia  The patient expressed understanding of the risks involved all questions were answered and the patient consented to the procedure  Operative technique: The patient's bladder was empty to avoid injury, adequate anesthesia was noted and the patient was in the dorsal lithotomy position  The fetal position was checked and confirmed to be KARRIE at +2 station with confirmed rupture of membranes  The vacuum extractor cup was applied at 0140  It was applied to the fetal median flexion point and centered over the sagittal suture approximately 2 cm anterior to the posterior fontanelle  The check of application was confirmed that there was no maternal tissue within the cut margin  With the start of the contraction a vacuum seal was established  Gentle traction was then applied and advancement of the fetal head was noted  Three total pulls was performed with no pop offs  The vacuum was released upon  of the fetal head 4 minutes later and delivery was performed thereafter with the assistance of maternal expulsive efforts  At 0145 the patient delivered a viable female , wt pending, apgars of 5 (1 min) and 9 (5 min)  The fetal vertex delivered spontaneously  Baby was checked for nuchal  There was none present  The anterior shoulder delivered atraumatically with maternal expulsive forces and the assistance of downward traction  The posterior shoulder delivered with maternal expulsive forces and the assistance of upward traction  The remainder of the fetus delivered spontaneously  Upon delivery, the infant was placed on the mothers abdomen and the cord was clamped and cut  Delayed cord clamping was accomplished  The infant was noted to cry spontaneously and was moving all extremities appropriately  There was no evidence for injury  Awaiting nurse resuscitators evaluated the    Arterial and venous cord blood gases and cord blood was collected for analysis  These were promptly sent to the lab  Stem cell blood was also collected for blood bank  In the immediate post-partum, 30 units of IV pitocin was administered, and the uterus was noted to contract down well with massage and pitocin  The placenta delivered spontaneously at 0154 and was noted to have a centrally inserted 3 vessel cord  The vagina, cervix, perineum, and rectum were inspected and there was noted to be a small first degree perineal laceration that was repaired with 3-0 vicryl rapide  At the conclusion of the procedure, all needle, sponge, and instrument counts were noted to be correct  Patient tolerated the procedure well and was allowed to recover in labor and delivery room with family and  before being transferred to the post-partum floor  The attending was present and participated in all key portions of the case        Marianna Francois MD  2018  2:37 AM

## 2018-04-22 NOTE — DISCHARGE SUMMARY
Discharge Summary - Erlinda Monroe 45 y o  female MRN: [id number]    Unit/Bed#: L&D 233-76 Encounter: [id number]    Admission Date: 2018     Discharge Date: 18    Delivery Attending: Price Snyder DO    Discharge Attending: Zee Nolasco DO    Principal Diagnosis:   1  Pregnancy at 37w4d  2  A2GDM (28U levemir AM/PM)  3  Herpes, on valtrex  4  Advanced maternal age  11  Polyhydramnios     Procedures:  Vacuum Assisted Vaginal Delivery    Complications: none apparent    Hospital Course: Ms Erlinda Monroe is a 45 y o   at 37wk4d who was admitted for labor  She was expectantly managed and was artificially ruptured for augmentation  She received an epidural for pain control  After she became fully dilated, she proceeded to push for 2 hours and 50 minutes  Due to maternal exhaustion and nonreassuring fetal heart tones with recurrent variable decelerations, recommendations were made for vacuum assisted vaginal delivery  She delivered via vacuum assisted vaginal delivery with no complications and a first-degree perineal laceration  She had an uncomplicated postpartum course  Condition at discharge: stable     On day of discharge, patient was tolerating PO, passing flatus, urinating, and ambulating  Her pain was well controlled with oral analgesics  She was discharged home on postpartum day #2 with standard post partum instructions to follow up with her physician in 3-6 weeks for a postpartum appointment  Discharge instructions/Information to patient and family:   - Do not place anything (no partner, tampons or douche) in your vagina for 6 weeks    -You may walk for exercise for the first 6 weeks then gradually return to your usual activities    -Please do not drive for 1 week if you have no stitches and for 2 weeks if you have stitches or underwent a  delivery     -You may take baths or shower per your preference    -Please look at your bust (breasts) in the mirror daily and call for redness or tenderness or increased warmth    -Please call us for temperature > 100 4*F or 38* C, worsening pain or a foul discharge       Discharge Medications:   Prenatal vitamin daily for 6 months or the duration of nursing whichever is longer  Motrin 600 mg orally every 6 hours as needed for pain  Tylenol (over the counter) per bottle directions as needed for pain: do NOT use with percocet  Hydrocortisone cream 1% (over the counter) applied 1-2x daily to hemorrhoids as needed    Provisions for Follow-Up Care: Follow up with your doctor in 6 weeks for postpartum care       Planned Readmission: Lindsay Enriquez DO  9:20 AM  04/24/18

## 2018-04-22 NOTE — OB LABOR/OXYTOCIN SAFETY PROGRESS
Labor Progress Note - Ana Staff 45 y o  female MRN: [id number]    Unit/Bed#: L&D 326-01 Encounter: [id number]    Obstetric History       T0      L0     SAB0   TAB0   Ectopic0   Multiple0   Live Births0      Gestational Age: 37w4d     Contraction Frequency (minutes): 2-3  Contraction Quality: Moderate  Tachysystole: No   Dilation: 10  Dilation Complete Date: 18  Dilation Complete Time: 2249  Effacement (%): 100  Station: 1  Baseline Rate: 125 bpm (diff trace at times, sitting hi-fowlers)  Fetal Heart Rate: 130 BPM  FHR Category: Category I          Notes/comments:     Patient pushing  CAT I tracing      D/w Dr Camilo Duque MD 2018 11:36 PM

## 2018-04-22 NOTE — DISCHARGE INSTRUCTIONS
Vaginal Delivery   WHAT YOU SHOULD KNOW:   A vaginal delivery is the birth of your baby through your vagina (birth canal)  AFTER YOU LEAVE:   Medicines:  · NSAIDs  help decrease swelling and pain or fever  This medicine is available with or without a doctor's order  NSAIDs can cause stomach bleeding or kidney problems in certain people  If you take blood thinner medicine, always ask your healthcare provider if NSAIDs are safe for you  Always read the medicine label and follow directions  · Take your medicine as directed  Call your healthcare provider if you think your medicine is not helping or if you have side effects  Tell him if you are allergic to any medicine  Keep a list of the medicines, vitamins, and herbs you take  Include the amounts, and when and why you take them  Bring the list or the pill bottles to follow-up visits  Carry your medicine list with you in case of an emergency  Follow up with your primary healthcare provider:  Most women need to return 6 weeks after a vaginal delivery  Ask about how to care for your wounds or stitches  Write down your questions so you remember to ask them during your visits  Activity:  Rest as much as possible  Try to keep all activities short  You may be able to do some exercise soon after you have your baby  Talk with your primary healthcare provider before you start exercising  If you work outside the home, ask when you can return to your job  Kegel exercises:  Kegel exercises may help your vaginal and rectal muscles heal faster  You can do Kegel exercises by tightening and relaxing the muscles around your vagina  Kegel exercises help make the muscles stronger  Breast care:  When your milk comes in, your breasts may feel full and hard  Ask how to care for your breasts, even if you are not breastfeeding  Constipation:  Do not try to push the bowel movement out if it is too hard   High-fiber foods, extra liquids, and regular exercise can help you prevent constipation  Examples of high-fiber foods are fruit and bran  Prune juice and water are good liquids to drink  Regular exercise helps your digestive system work  You may also be told to take over-the-counter fiber and stool softener medicines  Take these items as directed  Hemorrhoids:  Pregnancy can cause severe hemorrhoids  You may have rectal pain because of the hemorrhoids  Ask how to prevent or treat hemorrhoids  Perineum care: Your perineum is the area between your vagina and anus  Keep the area clean and dry to help it heal and to prevent infection  Wash the area gently with soap and water when you bathe or shower  Rinse your perineum with warm water when you use the toilet  Your primary healthcare provider may suggest you use a warm sitz bath to help decrease pain  A sitz bath is a bathtub or basin filled to hip level  Stay in the sitz bath for 20 to 30 minutes, or as directed  Vaginal discharge: You will have vaginal discharge, called lochia, after your delivery  The lochia is bright red the first day or two after the birth  By the fourth day, the amount decreases, and it turns red-brown  Use a sanitary pad rather than a tampon to prevent a vaginal infection  It is normal to have lochia up to 8 weeks after your baby is born  Monthly periods: Your period may start again within 7 to 12 weeks after your baby is born  If you are breastfeeding, it may take longer for your period to start again  You can still get pregnant again even though you do not have your monthly period  Talk with your primary healthcare provider about a birth control method that will be good for you if you do not want to get pregnant  Mood changes: Many new mothers have some kind of mood changes after delivery  Some of these changes occur because of lack of sleep, hormone changes, and caring for a new baby  Some mood changes can be more serious, such as postpartum depression   Talk with your primary healthcare provider if you feel unable to care for yourself or your baby  Sexual activity:  You may need to avoid sex for 6 to 7 weeks after you have your baby  You may notice you have a decreased desire for sex, or sex may be painful  You may need to use a vaginal lubricant (gel) to help make sex more comfortable  Contact your primary healthcare provider if:   · You have heavy vaginal bleeding that fills 1 or more sanitary pads in 1 hour  · You have a fever  · Your pain does not go away, or gets worse  · The skin between your vagina and rectum is swollen, warm, or red  · You have swollen, hard, or painful breasts  · You feel very sad or depressed  · You feel more tired than usual      · You have questions or concerns about your condition or care  Seek care immediately or call 911 if:   · You have pus or yellow drainage coming from your vagina or wound  · You are urinating very little, or not at all  · Your arm or leg feels warm, tender, and painful  It may look swollen and red  · You feel lightheaded, have sudden and worsening chest pain, or trouble breathing  You may have more pain when you take deep breaths or cough, or you may cough up blood  © 2014 [address] is for End User's use only and may not be sold, redistributed or otherwise used for commercial purposes  All illustrations and images included in CareNotes® are the copyrighted property of [employer] , [employer]  or [employer]  The above information is an  only  It is not intended as medical advice for individual conditions or treatments  Talk to your doctor, nurse or pharmacist before following any medical regimen to see if it is safe and effective for you

## 2018-04-22 NOTE — PLAN OF CARE
Problem: POSTPARTUM  Goal: Experiences normal postpartum course  INTERVENTIONS:  - Monitor maternal vital signs  - Assess uterine involution and lochia   Outcome: Progressing    Goal: Appropriate maternal -  bonding  INTERVENTIONS:  - Identify family support  - Assess for appropriate maternal/infant bonding   -Encourage maternal/infant bonding opportunities  - Referral to  or  as needed   Outcome: Progressing    Goal: Establishment of infant feeding pattern  INTERVENTIONS:  - Assess breast/bottle feeding  - Refer to lactation as needed   Outcome: Progressing

## 2018-04-23 LAB — RPR SER QL: NORMAL

## 2018-04-23 RX ORDER — CYCLOBENZAPRINE HCL 10 MG
10 TABLET ORAL 3 TIMES DAILY
Status: DISCONTINUED | OUTPATIENT
Start: 2018-04-23 | End: 2018-04-24 | Stop reason: HOSPADM

## 2018-04-23 RX ADMIN — IBUPROFEN 600 MG: 600 TABLET ORAL at 13:33

## 2018-04-23 RX ADMIN — DOCUSATE SODIUM 100 MG: 100 CAPSULE, LIQUID FILLED ORAL at 07:52

## 2018-04-23 RX ADMIN — PANTOPRAZOLE SODIUM 40 MG: 40 TABLET, DELAYED RELEASE ORAL at 06:17

## 2018-04-23 RX ADMIN — CYCLOBENZAPRINE HYDROCHLORIDE 10 MG: 10 TABLET, FILM COATED ORAL at 21:08

## 2018-04-23 RX ADMIN — Medication 1000 MG: at 12:02

## 2018-04-23 RX ADMIN — DOCUSATE SODIUM 100 MG: 100 CAPSULE, LIQUID FILLED ORAL at 17:37

## 2018-04-23 RX ADMIN — SERTRALINE HYDROCHLORIDE 100 MG: 100 TABLET ORAL at 07:52

## 2018-04-23 RX ADMIN — IBUPROFEN 600 MG: 600 TABLET ORAL at 06:21

## 2018-04-23 RX ADMIN — IBUPROFEN 600 MG: 600 TABLET ORAL at 19:36

## 2018-04-23 RX ADMIN — CYCLOBENZAPRINE HYDROCHLORIDE 10 MG: 10 TABLET, FILM COATED ORAL at 15:48

## 2018-04-23 NOTE — PROGRESS NOTES
Progress Note - OB/GYN   Mar[facility]cal [facility] 45 y o  female MRN: [id number]  Unit/Bed#: L&D 311-01 Encounter: [id number]    Assessment:  PP #1 s/p Vacuum Vaginal Delivery, stable    Plan:  1  A2GDM: for 2 hour GTT postpartum  2  Chronic hypertension: -150/60-70, without symptoms  3  Chest pain: hx GERD, hiatal hernia: EKG WNL, continue to monitor  4  Continue routine postpartum care  5  Encourage ambulation  6  Encourage breastfeeding  7  Pain control as needed    Disposition: stable, anticipate discharge tomorrow    Subjective/Objective   Chief Complaint:     PP #1 s/p Vacuum Vaginal Delivery    Subjective:     Pain: some cramping, improved with motrin  Tolerating PO: yes  Voiding: yes  Flatus: yes  BM: no  Ambulating: yes  Breastfeeding: Breastfeeding  Chest pain: resolved  Shortness of breath: no  Leg pain: no  Lochia: moderate    Objective:     Vitals:  Vitals:    04/22/18 1145 04/22/18 1515 04/22/18 1900 04/22/18 2300   BP:  142/81 131/71 126/65   BP Location:   Left arm Left arm   Pulse:  89 74 86   Resp: 18 18 18 18   Temp:  98 4 °F (36 9 °C) 98 6 °F (37 °C) 98 5 °F (36 9 °C)   TempSrc:  Oral Oral Oral   SpO2: 100%      Weight:       Height:           Physical Exam:     Physical Exam   Constitutional: She is oriented to person, place, and time  She appears well-developed and well-nourished  Cardiovascular: Normal rate, regular rhythm and normal heart sounds  Pulmonary/Chest: Effort normal and breath sounds normal    Abdominal: Soft  Bowel sounds are normal    Neurological: She is alert and oriented to person, place, and time  Uterine fundus firm and non-tender, at the umbilicus       Lab, Imaging and other studies: I have personally reviewed pertinent reports        Lab Results   Component Value Date    WBC 13 89 (H) 04/21/2018    HGB 10 9 (L) 04/21/2018    HCT 32 8 (L) 04/21/2018    MCV 90 04/21/2018     04/21/2018               Eamon Been, DO  04/23/18

## 2018-04-23 NOTE — PLAN OF CARE

## 2018-04-23 NOTE — LACTATION NOTE
This note was copied from a baby's chart  Met with mother to go over feeding log since birth for the first week  Emphasized 8 or more (12) feedings in a 24 hour period, what to expect for the number of diapers per day of life and the progression of properties of the  stooling pattern  Discussed s/s that breastfeeding is going well after day 4 and when to get help from a pediatrician or lactation support person after day 4  Booklet included Breast Pumping Instructions, When You Go Back to Work or School, and Breastfeeding Resources for after discharge including access to the number for the [address]  Father of baby present and received education

## 2018-04-23 NOTE — LACTATION NOTE
This note was copied from a baby's chart  CONSULT - LACTATION  Baby Girl  Sveta Gibson 1 days female MRN: [id number]    [address] [place] [address] NURSERY Room / Bed: L&D 311(N)/L&D 311(N) Encounter: [id number]    Maternal Information     MOTHER:  Felipa Gibson  Maternal Age: 45 y o    OB History: #: 1, Date: 18, Sex: Female, Weight: 3118 g (6 lb 14 oz), GA: 37w5d, Delivery: Vaginal, Vacuum (Extractor), Apgar1: 5, Apgar5: 9, Living: Living, Birth Comments: None   Previouse breast reduction surgery? No    Lactation history:   Has patient previously breast fed: No   How long had patient previously breast fed:     Previous breast feeding complications:       Past Surgical History:   Procedure Laterality Date    ABDOMINAL SURGERY      CERVICAL BIOPSY  W/ LOOP ELECTRODE EXCISION             Birth information:  YOB: 2018   Time of birth: 1:45 AM   Sex: female   Delivery type: Vaginal, Vacuum (Extractor)   Birth Weight: 3118 g (6 lb 14 oz)   Percent of Weight Change: -4%     Gestational Age: 37w6d   [unfilled]    Assessment     Breast and nipple assessment: did not assess at this time     Assessment: did not assess at this time    Feeding assessment: feeding well  LATCH:  Latch: Grasps breast, tongue down, lips flanged, rhythmic sucking   Audible Swallowing: A few with stimulation   Type of Nipple: Everted (After stimulation)   Comfort (Breast/Nipple): Soft/non-tender   Hold (Positioning): Full assist, teach one side, mother does other, staff holds   LATCH Score: 8          Feeding recommendations:  breast feed on demand     Met with mother  Provided mother with Ready, Set, Baby booklet  Discussed Skin to Skin contact an benefits to mom and baby  Talked about the delay of the first bath until baby has adjusted  Spoke about the benefits of rooming in  Feeding on cue and what that means for recognizing infant's hunger   Avoidance of pacifiers for the first month discussed  Talked about exclusive breastfeeding for the first 6 months  Positioning and latch reviewed as well as showing images of other feeding positions  Discussed the properties of a good latch in any position  Reviewed hand/manual expression  Discussed s/s that baby is getting enough milk and some s/s that breastfeeding dyad may need further help  Gave information on common concerns, what to expect the first few weeks after delivery, preparing for other caregivers, and how partners can help  Resources for support also provided  Went over feeding log for the first week  Emphasized 8 or more (12) feedings in a 24 hour period, what to expect for the number of diapers per day of life and the progression of properties of the  stooling pattern  Discussed s/s that breastfeeding is going well after day 4 and when to get help from a pediatrician or lactation support person after day 4  Booklet included Breast Pumping Instructions, When You Go Back to Work or School, and Breastfeeding Resources for after discharge including access to the number for the [employer]  Father of baby present and received education       Brock Scott RN 2018 8:46 AM

## 2018-04-23 NOTE — SOCIAL WORK
CM met with mom and dad, Lo Chapin, to do a general SW assessment  Mom gave birth to a baby girl, Neri Zimmer  Mom has no other children at home  Reports having all necessary items for baby at discharge  Is breastfeeding and has pump at home  Family not eligible for [facility] services  Is using [facility], plans to f/u at peds office on Wednesday for initial well baby visit  Parents drive  Prenatal care provided through [facility]  Baby will be enrolled in [employer] plan, she knows to call within the first 30 days to aovid gaps in care  Mom , hx of Depression and Anxiety  Lives with her fiance and disabled mother  She admits this may be stressful for her as she does provide care to her mother at home  She has a sister in [place] who will be up this weekend to provide support around the house  I did mention perhaps talking to other family members about having HHA provided to her mother initially as it may be too stressful for mom to be a caretaker to both her mother and her baby at the same time, while adjusting to motherhood  We discussed signs and symptoms of PPD, differences between PPD and BB and any amount of "normal sadness" that may be expected  She was educated on services provided at the [facility] and [facility] for PPD as well as for breastfeeding support  She wished to stay another night, under her insurance she does still have one more night to stay in the hospital      No social concerns identified

## 2018-04-24 VITALS
TEMPERATURE: 98.1 F | HEIGHT: 66 IN | BODY MASS INDEX: 32.78 KG/M2 | SYSTOLIC BLOOD PRESSURE: 138 MMHG | OXYGEN SATURATION: 97 % | DIASTOLIC BLOOD PRESSURE: 68 MMHG | RESPIRATION RATE: 18 BRPM | HEART RATE: 68 BPM | WEIGHT: 204 LBS

## 2018-04-24 PROCEDURE — 90707 MMR VACCINE SC: CPT | Performed by: OBSTETRICS & GYNECOLOGY

## 2018-04-24 RX ORDER — IBUPROFEN 600 MG/1
600 TABLET ORAL EVERY 6 HOURS PRN
Qty: 30 TABLET | Refills: 0
Start: 2018-04-24 | End: 2022-06-17

## 2018-04-24 RX ADMIN — PANTOPRAZOLE SODIUM 40 MG: 40 TABLET, DELAYED RELEASE ORAL at 05:11

## 2018-04-24 RX ADMIN — SERTRALINE HYDROCHLORIDE 100 MG: 100 TABLET ORAL at 08:34

## 2018-04-24 RX ADMIN — MEASLES, MUMPS, AND RUBELLA VIRUS VACCINE LIVE 0.5 ML: 1000; 12500; 1000 INJECTION, POWDER, LYOPHILIZED, FOR SUSPENSION SUBCUTANEOUS at 10:29

## 2018-04-24 RX ADMIN — DOCUSATE SODIUM 100 MG: 100 CAPSULE, LIQUID FILLED ORAL at 08:34

## 2018-04-24 RX ADMIN — CYCLOBENZAPRINE HYDROCHLORIDE 10 MG: 10 TABLET, FILM COATED ORAL at 08:34

## 2018-04-24 RX ADMIN — IBUPROFEN 600 MG: 600 TABLET ORAL at 11:13

## 2018-04-24 RX ADMIN — WITCH HAZEL 1 PAD: 500 SOLUTION RECTAL; TOPICAL at 11:21

## 2018-04-24 NOTE — LACTATION NOTE
This note was copied from a baby's chart  Met with mother to go over feeding log since birth for the first week  Emphasized 8 or more (12) feedings in a 24 hour period, what to expect for the number of diapers per day of life and the progression of properties of the  stooling pattern  Discussed s/s that breastfeeding is going well after day 4 and when to get help from a pediatrician or lactation support person after day 4  Booklet included Breast Pumping Instructions, When You Go Back to Work or School, and Breastfeeding Resources for after discharge including access to the number for the [employer]  Mom c/o sore nipples  Demo  proper position and latch in football hold and baby latched well   Enc to call for assistance as needed,phone # given

## 2018-04-24 NOTE — PROGRESS NOTES
Progress Note - Obstetrics  Post-Partum Physician Note   Norah Mccabe 45 y o  female MRN: [id number]  Unit/Bed#: L&D 311-01 Encounter: [id number]    ASSESSMENT:  Postpartum day # 2 status post Vacuum Assisted Vaginal Delivery      PLAN:  1) Continue Routine Postpartum Care  2) Encourage Ambulation  3) Advance diet as tolerated  4) Anticipate d/c home today   5) CHTN: BP check in 1week  6) A2GDM: follow-up @ 6 wks post-partum for a 75 gram 2 hr Glucose Tolerance Test        SUBJECTIVE:    Pain: minimal  Tolerating Oral Intake: is tolerating PO liquids and solids  Voiding: yes - spontaneously  Flatus: yes  Bowel Movement: no  Ambulating: yes  Breastfeeding: Breastfeeding  Chest Pain: no  Shortness of Breath: no  Leg Pain/Discomfort: no  Lochia: minimal        OBJECTIVE:     Vitals:   Vitals:    04/23/18 0702 04/23/18 0808 04/23/18 1454 04/23/18 2300   BP: 125/69 137/76 137/67 136/77   BP Location: Right arm Right arm Right arm Right arm   Pulse: 68 96 88 74   Resp: 18 18 18 18   Temp: 97 7 °F (36 5 °C) 97 6 °F (36 4 °C) 98 4 °F (36 9 °C) 97 5 °F (36 4 °C)   TempSrc: Oral Oral Oral Oral   SpO2:  97%     Weight:       Height:           I/O       04/22 0701 - 04/23 0700 04/23 0701 - 04/24 0700    Urine (mL/kg/hr) 900 (0 4)     Total Output 900      Net -900                    Results from last 7 days  Lab Units 04/21/18  1114 04/20/18  0810   WBC Thousand/uL 13 89* 13 57*   NEUTROS ABS Thousands/µL 11 84* 11 88*   HEMOGLOBIN g/dL 10 9* 11 4*   MCV fL 90 90   PLATELETS Thousands/uL 218 191       Results from last 7 days  Lab Units 04/21/18  1114 04/20/18  0810   NEUTROS PCT % 85* 88*   LYMPHS PCT % 9* 8*   MONOS PCT % 5 4   EOS PCT % 1 0           Physical Exam:  Physical exam:   General: No Acute Distress   Cardiovascular: Regular Rate and Rhythm   Lungs: Clear to Auscultation Bilaterally, no wheezing, rhonchi or rales   Abdomen: non-tender, no rebound or guarding;     Fundus: Firm    Fundal Location: -1 cm below the umbilicus   Lower Extremities: negative Jacqueline's sign bilaterally   Neuro: Alert, cooperative          MEDS:   Continuous Infusions:       Scheduled Meds:    Current Facility-Administered Medications:  acetaminophen 650 mg Oral Q4H PRN Oris Class, MD   aluminum-magnesium hydroxide-simethicone 15 mL Oral Q6H PRN Oris Class, MD   benzocaine-menthol-lanolin-aloe 1 application Topical 4x Daily PRN Oris Class, MD   calcium carbonate 1,000 mg Oral Daily PRN Oris Class, MD   cyclobenzaprine 10 mg Oral TID Shyanne Handing, DO   diphenhydrAMINE 25 mg Oral Q6H PRN Oris Class, MD   docusate sodium 100 mg Oral BID Oris Class, MD   hydrocortisone 1 application Topical PRN Oris Class, MD   ibuprofen 600 mg Oral Q4H PRN Oris Class, MD   measles-mumps-rubella 0 5 mL Subcutaneous Once Janes Daft, DO   ondansetron 4 mg Intravenous Q8H PRN Oris Class, MD   oxyCODONE-acetaminophen 1 tablet Oral Q4H PRN Oris Class, MD   oxyCODONE-acetaminophen 2 tablet Oral Q4H PRN Oris Class, MD   pantoprazole 40 mg Oral Early Morning Mary Crews, MD   sertraline 100 mg Oral Daily Oris Class, MD   simethicone 80 mg Oral 4x Daily PRN Oris Class, MD   witch hazel-glycerin 1 pad Topical Q4H PRN Oris Class, MD       PRN Meds:    acetaminophen    aluminum-magnesium hydroxide-simethicone    benzocaine-menthol-lanolin-aloe    calcium carbonate    diphenhydrAMINE    hydrocortisone    ibuprofen    ondansetron    oxyCODONE-acetaminophen    oxyCODONE-acetaminophen    simethicone    witch hazel-glycerin    Medication Doses in Trailing 24 hours:  Medication Administration - last 24 hours from 04/23/2018 0644 to 04/24/2018 7855       Date/Time Order Dose Route Action Action by     04/23/2018 0752 sertraline (ZOLOFT) tablet 100 mg 100 mg Oral Given Gerry Garcia, SAADIA     04/23/2018 1936 ibuprofen (MOTRIN) tablet 600 mg 600 mg Oral Given Sophie Fabian RN     04/23/2018 1333 ibuprofen (MOTRIN) tablet 600 mg 600 mg Oral Given Maddy Hunter RN     04/23/2018 1737 docusate sodium (COLACE) capsule 100 mg 100 mg Oral Given Shyanne Wilson RN     04/23/2018 0752 docusate sodium (COLACE) capsule 100 mg 100 mg Oral Given Maddy Hunter RN     04/23/2018 1202 calcium carbonate (TUMS) chewable tablet 1,000 mg 1,000 mg Oral Given Maddy Hunter RN     04/24/2018 0511 pantoprazole (PROTONIX) EC tablet 40 mg 40 mg Oral Given Genevieve Ojeda RN     04/23/2018 2108 cyclobenzaprine (FLEXERIL) tablet 10 mg 10 mg Oral Given Genevieve Ojeda RN     04/23/2018 1548 cyclobenzaprine (FLEXERIL) tablet 10 mg 10 mg Oral Given Shyanne Wilson RN              Signature / Title: Megan Escobar MD, Resident - Ob/Gyn

## 2018-04-24 NOTE — PLAN OF CARE
POSTPARTUM     Experiences normal postpartum course Progressing     Appropriate maternal -  bonding Progressing     Establishment of infant feeding pattern Progressing

## 2018-04-30 LAB — HBA1C MFR BLD HPLC: 5.3 %

## 2018-05-05 LAB — PLACENTA IN STORAGE: NORMAL

## 2018-05-07 ENCOUNTER — APPOINTMENT (EMERGENCY)
Dept: CT IMAGING | Facility: HOSPITAL | Age: 38
End: 2018-05-07
Payer: COMMERCIAL

## 2018-05-07 ENCOUNTER — HOSPITAL ENCOUNTER (EMERGENCY)
Facility: HOSPITAL | Age: 38
Discharge: HOME | End: 2018-05-07
Attending: EMERGENCY MEDICINE
Payer: COMMERCIAL

## 2018-05-07 VITALS
DIASTOLIC BLOOD PRESSURE: 69 MMHG | WEIGHT: 181.1 LBS | SYSTOLIC BLOOD PRESSURE: 133 MMHG | BODY MASS INDEX: 29.23 KG/M2 | TEMPERATURE: 97 F | RESPIRATION RATE: 16 BRPM | OXYGEN SATURATION: 97 % | HEART RATE: 87 BPM

## 2018-05-07 DIAGNOSIS — R07.9 CHEST PAIN: Primary | ICD-10-CM

## 2018-05-07 DIAGNOSIS — K20.90 ESOPHAGITIS: ICD-10-CM

## 2018-05-07 LAB
ALBUMIN SERPL BCP-MCNC: 3.3 G/DL (ref 3.5–5)
ALP SERPL-CCNC: 96 U/L (ref 46–116)
ALT SERPL W P-5'-P-CCNC: 21 U/L (ref 12–78)
ANION GAP SERPL CALCULATED.3IONS-SCNC: 10 MMOL/L (ref 4–13)
AST SERPL W P-5'-P-CCNC: 23 U/L (ref 5–45)
ATRIAL RATE: 93 BPM
BASOPHILS # BLD AUTO: 0.01 THOUSANDS/ΜL (ref 0–0.1)
BASOPHILS NFR BLD AUTO: 0 % (ref 0–1)
BILIRUB SERPL-MCNC: 0.23 MG/DL (ref 0.2–1)
BUN SERPL-MCNC: 17 MG/DL (ref 5–25)
CALCIUM SERPL-MCNC: 9 MG/DL (ref 8.3–10.1)
CHLORIDE SERPL-SCNC: 103 MMOL/L (ref 100–108)
CO2 SERPL-SCNC: 26 MMOL/L (ref 21–32)
CREAT SERPL-MCNC: 0.82 MG/DL (ref 0.6–1.3)
DEPRECATED D DIMER PPP: 5425 NG/ML (FEU) (ref 0–424)
EOSINOPHIL # BLD AUTO: 0.22 THOUSAND/ΜL (ref 0–0.61)
EOSINOPHIL NFR BLD AUTO: 3 % (ref 0–6)
ERYTHROCYTE [DISTWIDTH] IN BLOOD BY AUTOMATED COUNT: 14.1 % (ref 11.6–15.1)
GFR SERPL CREATININE-BSD FRML MDRD: 91 ML/MIN/1.73SQ M
GLUCOSE SERPL-MCNC: 81 MG/DL (ref 65–140)
HCT VFR BLD AUTO: 37.9 % (ref 34.8–46.1)
HGB BLD-MCNC: 12.1 G/DL (ref 11.5–15.4)
LIPASE SERPL-CCNC: 150 U/L (ref 73–393)
LYMPHOCYTES # BLD AUTO: 1.95 THOUSANDS/ΜL (ref 0.6–4.47)
LYMPHOCYTES NFR BLD AUTO: 29 % (ref 14–44)
MAGNESIUM SERPL-MCNC: 1.9 MG/DL (ref 1.6–2.6)
MCH RBC QN AUTO: 28.9 PG (ref 26.8–34.3)
MCHC RBC AUTO-ENTMCNC: 31.9 G/DL (ref 31.4–37.4)
MCV RBC AUTO: 91 FL (ref 82–98)
MONOCYTES # BLD AUTO: 0.37 THOUSAND/ΜL (ref 0.17–1.22)
MONOCYTES NFR BLD AUTO: 6 % (ref 4–12)
NEUTROPHILS # BLD AUTO: 4.2 THOUSANDS/ΜL (ref 1.85–7.62)
NEUTS SEG NFR BLD AUTO: 62 % (ref 43–75)
NRBC BLD AUTO-RTO: 0 /100 WBCS
P AXIS: 38 DEGREES
PLATELET # BLD AUTO: 263 THOUSANDS/UL (ref 149–390)
PMV BLD AUTO: 11 FL (ref 8.9–12.7)
POTASSIUM SERPL-SCNC: 4.3 MMOL/L (ref 3.5–5.3)
PR INTERVAL: 136 MS
PROT SERPL-MCNC: 7.6 G/DL (ref 6.4–8.2)
QRS AXIS: 34 DEGREES
QRSD INTERVAL: 82 MS
QT INTERVAL: 336 MS
QTC INTERVAL: 417 MS
RBC # BLD AUTO: 4.18 MILLION/UL (ref 3.81–5.12)
SODIUM SERPL-SCNC: 139 MMOL/L (ref 136–145)
T WAVE AXIS: 35 DEGREES
TROPONIN I SERPL-MCNC: <0.02 NG/ML
VENTRICULAR RATE: 93 BPM
WBC # BLD AUTO: 6.75 THOUSAND/UL (ref 4.31–10.16)

## 2018-05-07 PROCEDURE — 84484 ASSAY OF TROPONIN QUANT: CPT | Performed by: EMERGENCY MEDICINE

## 2018-05-07 PROCEDURE — 96360 HYDRATION IV INFUSION INIT: CPT

## 2018-05-07 PROCEDURE — 85025 COMPLETE CBC W/AUTO DIFF WBC: CPT | Performed by: EMERGENCY MEDICINE

## 2018-05-07 PROCEDURE — 83690 ASSAY OF LIPASE: CPT | Performed by: EMERGENCY MEDICINE

## 2018-05-07 PROCEDURE — 93005 ELECTROCARDIOGRAM TRACING: CPT | Performed by: EMERGENCY MEDICINE

## 2018-05-07 PROCEDURE — 93010 ELECTROCARDIOGRAM REPORT: CPT | Performed by: INTERNAL MEDICINE

## 2018-05-07 PROCEDURE — 83735 ASSAY OF MAGNESIUM: CPT | Performed by: EMERGENCY MEDICINE

## 2018-05-07 PROCEDURE — 36415 COLL VENOUS BLD VENIPUNCTURE: CPT | Performed by: EMERGENCY MEDICINE

## 2018-05-07 PROCEDURE — 99285 EMERGENCY DEPT VISIT HI MDM: CPT

## 2018-05-07 PROCEDURE — 80053 COMPREHEN METABOLIC PANEL: CPT | Performed by: EMERGENCY MEDICINE

## 2018-05-07 PROCEDURE — 96361 HYDRATE IV INFUSION ADD-ON: CPT

## 2018-05-07 PROCEDURE — 71275 CT ANGIOGRAPHY CHEST: CPT

## 2018-05-07 PROCEDURE — 85379 FIBRIN DEGRADATION QUANT: CPT | Performed by: EMERGENCY MEDICINE

## 2018-05-07 RX ORDER — FAMOTIDINE 20 MG/1
20 TABLET, FILM COATED ORAL 2 TIMES DAILY
Qty: 30 TABLET | Refills: 0 | Status: SHIPPED | OUTPATIENT
Start: 2018-05-07 | End: 2021-06-28

## 2018-05-07 RX ORDER — SUCRALFATE 1 G/1
1 TABLET ORAL 4 TIMES DAILY
Qty: 40 TABLET | Refills: 0 | Status: SHIPPED | OUTPATIENT
Start: 2018-05-07 | End: 2021-06-28

## 2018-05-07 RX ADMIN — SODIUM CHLORIDE 1000 ML: 0.9 INJECTION, SOLUTION INTRAVENOUS at 12:37

## 2018-05-07 RX ADMIN — IOHEXOL 85 ML: 350 INJECTION, SOLUTION INTRAVENOUS at 14:04

## 2018-05-07 NOTE — ED PROVIDER NOTES
History  Chief Complaint   Patient presents with    Chest Pain     2 weeks post partum,reports having chest tightness, no SOB, epigastric pain, headache since birth of baby, denies N/V       44 YO female, 2 weeks post-partum, presents with anterior chest discomfort for the last 2 weeks, since the birth of her child  Pt states this has been mostly constant, tightness, non-radiating, waxing and waning in severity  She additionally notes another epigastric discomfort, sharp, non-radiating, this occurs when eating, she does not know if certain foods cause a worse pain  Pt denies shortness of breath, lightheadedness, diaphoresis  States pain is not exertional  She additionally has a mild pain in the Left ankle, this has been present for the last few days but seems to be improving  Pt denies SOB/F/C/N/V/D/C, no dysuria, burning on urination or blood in urine  History provided by:  Patient   used: No    Chest Pain   Pain location:  Substernal area and epigastric  Pain quality: tightness    Pain radiates to:  Does not radiate  Pain severity:  Mild  Onset quality:  Unable to specify  Duration:  2 weeks  Timing:  Constant  Progression:  Waxing and waning  Chronicity:  New  Context: eating    Relieved by:  Nothing  Worsened by:  Nothing tried  Ineffective treatments:  None tried  Associated symptoms: no abdominal pain, no anxiety, no cough, no dizziness, no fatigue, no fever, no numbness, no palpitations, no shortness of breath, not vomiting and no weakness        Prior to Admission Medications   Prescriptions Last Dose Informant Patient Reported? Taking? BD PEN NEEDLE MAX U/F 32G X 4 MM MISC  Self No No   Sig: Use 2 a day     Prenatal Vit-Fe Fum-FA-Omega (ONE-A-DAY WOMENS PRENATAL PO)  Self Yes Yes   Sig: Take 2 tablets by mouth daily   ibuprofen (MOTRIN) 600 mg tablet   No Yes   Sig: Take 1 tablet (600 mg total) by mouth every 6 (six) hours as needed for moderate pain   iron polysaccharides (NIFEREX) 150 mg capsule Unknown at Unknown time Self Yes No   Sig: Take 150 mg by mouth daily   sertraline (ZOLOFT) 50 mg tablet  Self Yes Yes   Sig: Take 100 mg by mouth daily       Facility-Administered Medications: None       Past Medical History:   Diagnosis Date    Abnormal Pap smear of cervix     ? HPV    Anemia     during pregnancy    Anxiety     currently medicated    Depression     currently medicated    Diabetes mellitus ([facility] [address] )     A2 GDM    Hypertension     Migraine     prior to pregnancy    Psychiatric disorder     Visual impairment     wears corrective lenses       Past Surgical History:   Procedure Laterality Date    ABDOMINAL SURGERY      CERVICAL BIOPSY  W/ LOOP ELECTRODE EXCISION      2008       Family History   Problem Relation Age of Onset    Diabetes Mother     Hypertension Mother     Stroke Mother      about 12 years ago as of 3/22/2018    Cancer Father     No Known Problems Sister     No Known Problems Sister     No Known Problems Brother     No Known Problems Maternal Grandmother     No Known Problems Maternal Grandfather     No Known Problems Paternal Grandmother     No Known Problems Paternal Grandfather      I have reviewed and agree with the history as documented  Social History   Substance Use Topics    Smoking status: Never Smoker    Smokeless tobacco: Never Used    Alcohol use No        Review of Systems   Constitutional: Negative for chills, fatigue and fever  HENT: Negative for dental problem  Eyes: Negative for visual disturbance  Respiratory: Negative for cough and shortness of breath  Cardiovascular: Positive for chest pain  Negative for palpitations  Gastrointestinal: Negative for abdominal pain, diarrhea and vomiting  Genitourinary: Negative for dysuria and frequency  Musculoskeletal: Negative for arthralgias  Skin: Negative for rash  Neurological: Negative for dizziness, weakness, light-headedness and numbness  Psychiatric/Behavioral: Negative for agitation, behavioral problems and confusion  All other systems reviewed and are negative  Physical Exam  ED Triage Vitals [05/07/18 1124]   Temperature Pulse Respirations Blood Pressure SpO2   (!) 97 °F (36 1 °C) 101 18 133/73 97 %      Temp Source Heart Rate Source Patient Position - Orthostatic VS BP Location FiO2 (%)   Temporal Monitor Sitting Right arm --      Pain Score       5           Orthostatic Vital Signs  Vitals:    05/07/18 1124 05/07/18 1322   BP: 133/73 133/69   Pulse: 101 87   Patient Position - Orthostatic VS: Sitting Sitting       Physical Exam   Constitutional: She is oriented to person, place, and time  She appears well-developed and well-nourished  HENT:   Head: Normocephalic and atraumatic  Eyes: EOM are normal    Neck: Normal range of motion  Cardiovascular: Normal rate, regular rhythm and normal heart sounds  Pulmonary/Chest: Effort normal and breath sounds normal  She exhibits no tenderness  Abdominal: Soft  There is no tenderness  Musculoskeletal: Normal range of motion  Neurological: She is alert and oriented to person, place, and time  Skin: Skin is warm and dry  Psychiatric: She has a normal mood and affect  Her behavior is normal  Thought content normal    Nursing note and vitals reviewed        ED Medications  Medications   sodium chloride 0 9 % bolus 1,000 mL (0 mL Intravenous Stopped 5/7/18 1423)   iohexol (OMNIPAQUE) 350 MG/ML injection (MULTI-DOSE) 85 mL (85 mL Intravenous Given 5/7/18 1404)       Diagnostic Studies  Results Reviewed     Procedure Component Value Units Date/Time    D-Dimer [[id number]]  (Abnormal) Collected:  05/07/18 1237    Lab Status:  Final result Specimen:  Blood from Arm, Right Updated:  05/07/18 1337     D-Dimer, Quant 5,425 (H) ng/ml (FEU)     Troponin I [[id number]]  (Normal) Collected:  05/07/18 1237    Lab Status:  Final result Specimen:  Blood from Arm, Right Updated:  05/07/18 1302 Troponin I <0 02 ng/mL     Narrative:         [employer] Chemistry analyzer 99% cutoff is > 0 04 ng/mL in network labs    o cTnI 99% cutoff is useful only when applied to patients in the clinical setting of myocardial ischemia  o cTnI 99% cutoff should be interpreted in the context of clinical history, ECG findings and possibly cardiac imaging to establish correct diagnosis  o cTnI 99% cutoff may be suggestive but clearly not indicative of a coronary event without the clinical setting of myocardial ischemia  Comprehensive metabolic panel [[id number]]  (Abnormal) Collected:  05/07/18 1237    Lab Status:  Final result Specimen:  Blood from Arm, Right Updated:  05/07/18 1302     Sodium 139 mmol/L      Potassium 4 3 mmol/L      Chloride 103 mmol/L      CO2 26 mmol/L      Anion Gap 10 mmol/L      BUN 17 mg/dL      Creatinine 0 82 mg/dL      Glucose 81 mg/dL      Calcium 9 0 mg/dL      AST 23 U/L      ALT 21 U/L      Alkaline Phosphatase 96 U/L      Total Protein 7 6 g/dL      Albumin 3 3 (L) g/dL      Total Bilirubin 0 23 mg/dL      eGFR 91 ml/min/1 73sq m     Narrative:         National Kidney Disease Education Program recommendations are as follows:  GFR calculation is accurate only with a steady state creatinine  Chronic Kidney disease less than 60 ml/min/1 73 sq  meters  Kidney failure less than 15 ml/min/1 73 sq  meters      Magnesium [[id number]]  (Normal) Collected:  05/07/18 1237    Lab Status:  Final result Specimen:  Blood from Arm, Right Updated:  05/07/18 1302     Magnesium 1 9 mg/dL     Lipase [[id number]]  (Normal) Collected:  05/07/18 1237    Lab Status:  Final result Specimen:  Blood from Arm, Right Updated:  05/07/18 1302     Lipase 150 u/L     CBC and differential [[id number]] Collected:  05/07/18 1237    Lab Status:  Final result Specimen:  Blood from Arm, Right Updated:  05/07/18 1245     WBC 6 75 Thousand/uL      RBC 4 18 Million/uL      Hemoglobin 12 1 g/dL      Hematocrit 37 9 %      MCV 91 fL      MCH 28 9 pg      MCHC 31 9 g/dL      RDW 14 1 %      MPV 11 0 fL      Platelets 498 Thousands/uL      nRBC 0 /100 WBCs      Neutrophils Relative 62 %      Lymphocytes Relative 29 %      Monocytes Relative 6 %      Eosinophils Relative 3 %      Basophils Relative 0 %      Neutrophils Absolute 4 20 Thousands/µL      Lymphocytes Absolute 1 95 Thousands/µL      Monocytes Absolute 0 37 Thousand/µL      Eosinophils Absolute 0 22 Thousand/µL      Basophils Absolute 0 01 Thousands/µL                  CTA ED chest PE study   Final Result by Shon Buerger, MD (05/07 9655)      No evidence of pulmonary emboli  No acute findings  4 mm noncalcified right middle lobe lung nodule  In a low-risk patient, this does not require any surveillance  If the patient has significant risk for malignancy, a one-year follow-up might be reasonable  Patient appears to have small hiatal hernia with reflux and perhaps some esophagitis as evidenced by some thickening of the distal 3rd of the esophageal wall  Workstation performed: [id number]                    Procedures  ECG 12 Lead Documentation  Date/Time: 5/7/2018 2:46 PM  Performed by: Mikael Scheuermann by: Manny Houston     ECG reviewed by me, the ED Provider: yes    Patient location:  ED  Interpretation:     Interpretation: normal    Rate:     ECG rate:  93    ECG rate assessment: normal    Rhythm:     Rhythm: sinus rhythm    QRS:     QRS axis:  Normal    QRS intervals:  Normal  Conduction:     Conduction: normal    ST segments:     ST segments:  Normal  T waves:     T waves: normal             Phone Contacts  ED Phone Contact    ED Course                               MDM  Number of Diagnoses or Management Options  Chest pain: new and requires workup  Esophagitis: new and requires workup  Diagnosis management comments: 1  Chest pain - Pt describes 2 distinct types of chest pain, first is central, tightness and constant, waxing and waning   For this will check ECG and troponin for ACS, d-dimer for PE  Second pain is epigastric, noticed mostly on eating; will check electrolytes, lipase and LFT's to assess pancreatitis and GB dysfunction, possible there is a component of acid reflux, will mostly likely treat with Pepcid and Carafate  Amount and/or Complexity of Data Reviewed  Clinical lab tests: ordered and reviewed  Tests in the radiology section of CPT®: ordered and reviewed  Obtain history from someone other than the patient: yes  Independent visualization of images, tracings, or specimens: yes    Patient Progress  Patient progress: stable    CritCare Time    Disposition  Final diagnoses:   Chest pain   Esophagitis     Time reflects when diagnosis was documented in both MDM as applicable and the Disposition within this note     Time User Action Codes Description Comment    5/7/2018  2:53 PM Denilson Perez [R07 9] Chest pain     5/7/2018  2:53 PM Denilson Perez [K20 9] Esophagitis       ED Disposition     ED Disposition Condition Comment    Discharge  Mian Short discharge to home/self care  Condition at discharge: Stable        Follow-up Information     Follow up With Specialties Details Why Contact Info      Schedule an appointment as soon as possible for a visit          Patient's Medications   Discharge Prescriptions    FAMOTIDINE (PEPCID) 20 MG TABLET    Take 1 tablet (20 mg total) by mouth 2 (two) times a day       Start Date: 5/7/2018  End Date: --       Order Dose: 20 mg       Quantity: 30 tablet    Refills: 0    SUCRALFATE (CARAFATE) 1 G TABLET    Take 1 tablet (1 g total) by mouth 4 (four) times a day       Start Date: 5/7/2018  End Date: --       Order Dose: 1 g       Quantity: 40 tablet    Refills: 0     No discharge procedures on file      ED Provider  Electronically Signed by           Michelle aJrrett MD  05/07/18 9800

## 2018-05-07 NOTE — ED NOTES
Patient transported to [facility] the [facility] [address], RN  05/07/18 6311

## 2018-05-07 NOTE — DISCHARGE INSTRUCTIONS
Take the pepcid regularly, twice daily  Use the carafate 15 minutes before meals, this is a coating agent to help protect your stomach  Speak with your family doctor, you should be seen in the office for further evaluation and management of your symptoms  Esophagitis   WHAT YOU NEED TO KNOW:   Esophagitis is inflammation or irritation of the lining of the esophagus  DISCHARGE INSTRUCTIONS:   Call 911 for any of the following:   · You have chest pain that does not go away within a few minutes or gets worse  Return to the emergency department if:   · You feel like you have food stuck in your throat and you cannot cough it out  Contact your healthcare provider if:   · You have new or worsening symptoms, even after treatment  · You have questions or concerns about your condition or care  Medicines:   · Medicines  may be given to fight an infection or to control stomach acid  · Take your medicine as directed  Contact your healthcare provider if you think your medicine is not helping or if you have side effects  Tell him of her if you are allergic to any medicine  Keep a list of the medicines, vitamins, and herbs you take  Include the amounts, and when and why you take them  Bring the list or the pill bottles to follow-up visits  Carry your medicine list with you in case of an emergency  Follow up with your healthcare provider as directed: You may need ongoing tests or treatment  Write down your questions so you remember to ask them during your visits  Do not smoke:  Nicotine and other chemicals in cigarettes and cigars can cause blood vessel and lung damage  Ask your healthcare provider for information if you currently smoke and need help to quit  E-cigarettes or smokeless tobacco still contain nicotine  Talk to your healthcare provider before you use these products  Do not drink alcohol:  Alcohol can irritate your esophagus   Talk to your healthcare provider if you need help to stop drinking  Keep batteries and similar objects out of the reach of children:  Babies often put items in their mouths to explore them  Button batteries are easy to swallow and can cause serious damage  Keep the battery covers of electronic devices such as remote controls taped closed  Store all batteries and toxic materials where children cannot get to them  Use childproof locks to keep children away from dangerous materials  Manage or prevent esophagitis:   · Limit or do not eat foods that can lead to esophagitis  Foods such as oranges and salsa can irritate your esophagus  Caffeine and chocolate can cause acid reflux  High-fat and fried foods make your stomach digest food more slowly  This increases the amount of stomach acid your esophagus is exposed to  Eat small meals, and drink water with your meals  Soft foods such as yogurt and applesauce may help soothe your throat  Do not eat for at least 3 hours before you go to bed  · Prevent acid reflux  Do not bend over unless it is necessary  Acid may back up into your esophagus when you bend over  If possible, keep the head of your bed elevated while you sleep  This will help keep acid from backing up  Manage stress  Stress can make your symptoms worse or cause stomach acid to back up  · Drink more liquid when you take pills  Drink a full glass of water when you take your pills  Ask your healthcare provider if you can take your pills at least an hour before you go to bed  © 2017 [address] Information is for End User's use only and may not be sold, redistributed or otherwise used for commercial purposes  All illustrations and images included in CareNotes® are the copyrighted property of [employer]  or [address]  The above information is an  only  It is not intended as medical advice for individual conditions or treatments   Talk to your doctor, nurse or pharmacist before following any medical regimen to see if it is safe and effective for you

## 2018-05-16 DIAGNOSIS — O24.414 INSULIN CONTROLLED GESTATIONAL DIABETES MELLITUS (GDM) IN THIRD TRIMESTER: ICD-10-CM

## 2018-05-16 DIAGNOSIS — Z3A.33 33 WEEKS GESTATION OF PREGNANCY: ICD-10-CM

## 2018-05-16 RX ORDER — INSULIN DETEMIR 100 [IU]/ML
INJECTION, SOLUTION SUBCUTANEOUS
Refills: 0 | OUTPATIENT
Start: 2018-05-16

## 2018-05-16 NOTE — TELEPHONE ENCOUNTER
I did not give a refill on her insulin as requested by pharmacy as patients sugars post partum were normal      Godfrey Aguila MD

## 2018-07-24 ENCOUNTER — TRANSCRIBE ORDERS (OUTPATIENT)
Dept: ADMINISTRATIVE | Facility: HOSPITAL | Age: 38
End: 2018-07-24

## 2018-07-24 DIAGNOSIS — R10.13 ABDOMINAL PAIN, EPIGASTRIC: Primary | ICD-10-CM

## 2018-07-26 ENCOUNTER — HOSPITAL ENCOUNTER (OUTPATIENT)
Dept: ULTRASOUND IMAGING | Facility: HOSPITAL | Age: 38
Discharge: HOME | End: 2018-07-26
Payer: COMMERCIAL

## 2018-07-26 DIAGNOSIS — R10.13 ABDOMINAL PAIN, EPIGASTRIC: ICD-10-CM

## 2018-07-26 PROCEDURE — 76705 ECHO EXAM OF ABDOMEN: CPT

## 2019-06-07 ENCOUNTER — APPOINTMENT (EMERGENCY)
Dept: RADIOLOGY | Facility: HOSPITAL | Age: 39
End: 2019-06-07
Payer: COMMERCIAL

## 2019-06-07 ENCOUNTER — HOSPITAL ENCOUNTER (EMERGENCY)
Facility: HOSPITAL | Age: 39
Discharge: HOME | End: 2019-06-07
Attending: EMERGENCY MEDICINE
Payer: COMMERCIAL

## 2019-06-07 VITALS
BODY MASS INDEX: 34.59 KG/M2 | WEIGHT: 214.29 LBS | SYSTOLIC BLOOD PRESSURE: 130 MMHG | HEART RATE: 93 BPM | OXYGEN SATURATION: 93 % | DIASTOLIC BLOOD PRESSURE: 58 MMHG | TEMPERATURE: 97.6 F | RESPIRATION RATE: 17 BRPM

## 2019-06-07 DIAGNOSIS — I47.1 SVT (SUPRAVENTRICULAR TACHYCARDIA) (HCC): Primary | ICD-10-CM

## 2019-06-07 LAB
ALBUMIN SERPL BCP-MCNC: 3.5 G/DL (ref 3.5–5)
ALP SERPL-CCNC: 73 U/L (ref 46–116)
ALT SERPL W P-5'-P-CCNC: 17 U/L (ref 12–78)
ANION GAP SERPL CALCULATED.3IONS-SCNC: 10 MMOL/L (ref 4–13)
AST SERPL W P-5'-P-CCNC: 16 U/L (ref 5–45)
ATRIAL RATE: 86 BPM
ATRIAL RATE: 97 BPM
BACTERIA UR QL AUTO: ABNORMAL /HPF
BASOPHILS # BLD AUTO: 0.02 THOUSANDS/ΜL (ref 0–0.1)
BASOPHILS NFR BLD AUTO: 0 % (ref 0–1)
BILIRUB SERPL-MCNC: 0.16 MG/DL (ref 0.2–1)
BILIRUB UR QL STRIP: NEGATIVE
BUN SERPL-MCNC: 20 MG/DL (ref 5–25)
CALCIUM SERPL-MCNC: 9.5 MG/DL (ref 8.3–10.1)
CHLORIDE SERPL-SCNC: 104 MMOL/L (ref 100–108)
CLARITY UR: CLEAR
CO2 SERPL-SCNC: 26 MMOL/L (ref 21–32)
COLOR UR: YELLOW
COLOR, POC: YELLOW
CREAT SERPL-MCNC: 1.07 MG/DL (ref 0.6–1.3)
EOSINOPHIL # BLD AUTO: 0.16 THOUSAND/ΜL (ref 0–0.61)
EOSINOPHIL NFR BLD AUTO: 2 % (ref 0–6)
ERYTHROCYTE [DISTWIDTH] IN BLOOD BY AUTOMATED COUNT: 14 % (ref 11.6–15.1)
EXT PREG TEST URINE: NORMAL
GFR SERPL CREATININE-BSD FRML MDRD: 66 ML/MIN/1.73SQ M
GLUCOSE SERPL-MCNC: 113 MG/DL (ref 65–140)
GLUCOSE UR STRIP-MCNC: NEGATIVE MG/DL
HCT VFR BLD AUTO: 36.4 % (ref 34.8–46.1)
HGB BLD-MCNC: 11 G/DL (ref 11.5–15.4)
HGB UR QL STRIP.AUTO: ABNORMAL
IMM GRANULOCYTES # BLD AUTO: 0.02 THOUSAND/UL (ref 0–0.2)
IMM GRANULOCYTES NFR BLD AUTO: 0 % (ref 0–2)
KETONES UR STRIP-MCNC: NEGATIVE MG/DL
LEUKOCYTE ESTERASE UR QL STRIP: ABNORMAL
LYMPHOCYTES # BLD AUTO: 2.32 THOUSANDS/ΜL (ref 0.6–4.47)
LYMPHOCYTES NFR BLD AUTO: 28 % (ref 14–44)
MAGNESIUM SERPL-MCNC: 1.8 MG/DL (ref 1.6–2.6)
MCH RBC QN AUTO: 25.5 PG (ref 26.8–34.3)
MCHC RBC AUTO-ENTMCNC: 30.2 G/DL (ref 31.4–37.4)
MCV RBC AUTO: 84 FL (ref 82–98)
MONOCYTES # BLD AUTO: 0.51 THOUSAND/ΜL (ref 0.17–1.22)
MONOCYTES NFR BLD AUTO: 6 % (ref 4–12)
NEUTROPHILS # BLD AUTO: 5.24 THOUSANDS/ΜL (ref 1.85–7.62)
NEUTS SEG NFR BLD AUTO: 64 % (ref 43–75)
NITRITE UR QL STRIP: NEGATIVE
NON-SQ EPI CELLS URNS QL MICRO: ABNORMAL /HPF
NRBC BLD AUTO-RTO: 0 /100 WBCS
P AXIS: 66 DEGREES
PH UR STRIP.AUTO: 7 [PH] (ref 4.5–8)
PHOSPHATE SERPL-MCNC: 3.3 MG/DL (ref 2.7–4.5)
PLATELET # BLD AUTO: 282 THOUSANDS/UL (ref 149–390)
PMV BLD AUTO: 11.6 FL (ref 8.9–12.7)
POTASSIUM SERPL-SCNC: 3.8 MMOL/L (ref 3.5–5.3)
PR INTERVAL: 136 MS
PROT SERPL-MCNC: 7.9 G/DL (ref 6.4–8.2)
PROT UR STRIP-MCNC: NEGATIVE MG/DL
QRS AXIS: 62 DEGREES
QRS AXIS: 63 DEGREES
QRSD INTERVAL: 78 MS
QRSD INTERVAL: 80 MS
QT INTERVAL: 242 MS
QT INTERVAL: 316 MS
QTC INTERVAL: 378 MS
QTC INTERVAL: 430 MS
RBC # BLD AUTO: 4.32 MILLION/UL (ref 3.81–5.12)
RBC #/AREA URNS AUTO: ABNORMAL /HPF
SODIUM SERPL-SCNC: 140 MMOL/L (ref 136–145)
SP GR UR STRIP.AUTO: 1.02 (ref 1–1.03)
T WAVE AXIS: -21 DEGREES
T WAVE AXIS: 49 DEGREES
TROPONIN I SERPL-MCNC: <0.02 NG/ML
TSH SERPL DL<=0.05 MIU/L-ACNC: 0.77 UIU/ML (ref 0.36–3.74)
UROBILINOGEN UR QL STRIP.AUTO: 0.2 E.U./DL
VENTRICULAR RATE: 190 BPM
VENTRICULAR RATE: 86 BPM
WBC # BLD AUTO: 8.27 THOUSAND/UL (ref 4.31–10.16)
WBC #/AREA URNS AUTO: ABNORMAL /HPF

## 2019-06-07 PROCEDURE — 84100 ASSAY OF PHOSPHORUS: CPT | Performed by: EMERGENCY MEDICINE

## 2019-06-07 PROCEDURE — 83735 ASSAY OF MAGNESIUM: CPT | Performed by: EMERGENCY MEDICINE

## 2019-06-07 PROCEDURE — 99285 EMERGENCY DEPT VISIT HI MDM: CPT

## 2019-06-07 PROCEDURE — 85025 COMPLETE CBC W/AUTO DIFF WBC: CPT | Performed by: EMERGENCY MEDICINE

## 2019-06-07 PROCEDURE — 71045 X-RAY EXAM CHEST 1 VIEW: CPT

## 2019-06-07 PROCEDURE — 84484 ASSAY OF TROPONIN QUANT: CPT | Performed by: EMERGENCY MEDICINE

## 2019-06-07 PROCEDURE — 84443 ASSAY THYROID STIM HORMONE: CPT | Performed by: EMERGENCY MEDICINE

## 2019-06-07 PROCEDURE — 81025 URINE PREGNANCY TEST: CPT | Performed by: EMERGENCY MEDICINE

## 2019-06-07 PROCEDURE — 80053 COMPREHEN METABOLIC PANEL: CPT | Performed by: EMERGENCY MEDICINE

## 2019-06-07 PROCEDURE — 81001 URINALYSIS AUTO W/SCOPE: CPT

## 2019-06-07 PROCEDURE — 36415 COLL VENOUS BLD VENIPUNCTURE: CPT | Performed by: EMERGENCY MEDICINE

## 2019-06-07 PROCEDURE — 93010 ELECTROCARDIOGRAM REPORT: CPT | Performed by: INTERNAL MEDICINE

## 2019-06-07 PROCEDURE — 93005 ELECTROCARDIOGRAM TRACING: CPT

## 2019-06-07 PROCEDURE — 96360 HYDRATION IV INFUSION INIT: CPT

## 2019-06-07 PROCEDURE — 99284 EMERGENCY DEPT VISIT MOD MDM: CPT | Performed by: EMERGENCY MEDICINE

## 2019-06-07 RX ORDER — OMEPRAZOLE 40 MG/1
40 CAPSULE, DELAYED RELEASE ORAL DAILY
COMMUNITY

## 2019-06-07 RX ORDER — DILTIAZEM HYDROCHLORIDE 120 MG/1
120 CAPSULE, COATED, EXTENDED RELEASE ORAL DAILY
Qty: 90 EACH | Refills: 0 | Status: SHIPPED | OUTPATIENT
Start: 2019-06-07 | End: 2021-06-28 | Stop reason: SDUPTHER

## 2019-06-07 RX ORDER — ADENOSINE 3 MG/ML
6 INJECTION INTRAVENOUS ONCE
Status: DISCONTINUED | OUTPATIENT
Start: 2019-06-07 | End: 2019-06-07

## 2019-06-07 RX ORDER — DILTIAZEM HYDROCHLORIDE 120 MG/1
120 CAPSULE, COATED, EXTENDED RELEASE ORAL DAILY
Qty: 90 EACH | Refills: 0 | Status: SHIPPED | OUTPATIENT
Start: 2019-06-07 | End: 2019-06-07 | Stop reason: SDUPTHER

## 2019-06-07 RX ORDER — RIZATRIPTAN BENZOATE 10 MG/1
10 TABLET ORAL ONCE AS NEEDED
COMMUNITY

## 2019-06-07 RX ADMIN — SODIUM CHLORIDE 1000 ML: 0.9 INJECTION, SOLUTION INTRAVENOUS at 14:53

## 2021-03-10 DIAGNOSIS — Z23 ENCOUNTER FOR IMMUNIZATION: ICD-10-CM

## 2021-06-28 ENCOUNTER — OFFICE VISIT (OUTPATIENT)
Dept: BARIATRICS | Facility: CLINIC | Age: 41
End: 2021-06-28
Payer: COMMERCIAL

## 2021-06-28 VITALS
HEIGHT: 67 IN | DIASTOLIC BLOOD PRESSURE: 90 MMHG | SYSTOLIC BLOOD PRESSURE: 110 MMHG | BODY MASS INDEX: 36.11 KG/M2 | WEIGHT: 230.1 LBS | HEART RATE: 85 BPM | TEMPERATURE: 98.2 F

## 2021-06-28 DIAGNOSIS — G43.009 MIGRAINE WITHOUT AURA: ICD-10-CM

## 2021-06-28 DIAGNOSIS — G47.33 OSA (OBSTRUCTIVE SLEEP APNEA): ICD-10-CM

## 2021-06-28 DIAGNOSIS — F32.A DEPRESSION: ICD-10-CM

## 2021-06-28 DIAGNOSIS — Z86.32 HISTORY OF GESTATIONAL DIABETES: ICD-10-CM

## 2021-06-28 DIAGNOSIS — E66.9 OBESITY, CLASS II, BMI 35-39.9: Primary | ICD-10-CM

## 2021-06-28 DIAGNOSIS — K21.9 GERD (GASTROESOPHAGEAL REFLUX DISEASE): ICD-10-CM

## 2021-06-28 DIAGNOSIS — I10 ESSENTIAL HYPERTENSION: ICD-10-CM

## 2021-06-28 PROBLEM — E66.812 OBESITY, CLASS II, BMI 35-39.9: Status: ACTIVE | Noted: 2021-06-28

## 2021-06-28 PROCEDURE — 99204 OFFICE O/P NEW MOD 45 MIN: CPT | Performed by: PHYSICIAN ASSISTANT

## 2021-06-28 RX ORDER — DILTIAZEM HYDROCHLORIDE 240 MG/1
240 CAPSULE, COATED, EXTENDED RELEASE ORAL DAILY
COMMUNITY
Start: 2021-06-10

## 2021-06-28 RX ORDER — SERTRALINE HYDROCHLORIDE 100 MG/1
150 TABLET, FILM COATED ORAL DAILY
COMMUNITY
Start: 2021-05-23

## 2021-06-28 RX ORDER — CALCIUM CARBONATE 750 MG/1
3 TABLET, CHEWABLE ORAL AS NEEDED
COMMUNITY

## 2021-06-28 NOTE — ASSESSMENT & PLAN NOTE
Taking maxalt  -continue management with prescribing provider  Had severe paraesthesias with Topamax

## 2021-06-28 NOTE — PROGRESS NOTES
Assessment/Plan:    Obesity, Class II, BMI 35-39 9  -Discussed options of [facility]-Intensive Lifestyle Intervention Program, Very Low Calorie Diet-VLCD, Conservative Program, Karolyn-En-Y Gastric Bypass and Vertical Sleeve Gastrectomy and the role of weight loss medications   -Initial weight loss goal of 5-10% weight loss for improved health  -Screening labs  Recommend checking lab coverage before having labs drawn  -NEEDS Lipids, tsh, a1c, fasting insulin, cmp   -Patient is interested in pursuing menu planning with partial meal replacement -with potential to do vlcd   No phentermine due to SVT  No topamax due to prior Side effects         Depression  Taking zoloft  -continue management with prescribing provider      Migraine without aura  Taking maxalt  -continue management with prescribing provider  Had severe paraesthesias with Topamax     Essential hypertension  Taking cardizem  -should improve with weight loss, dietary, and lifestyle changes  -continue management with prescribing provider      GERD (gastroesophageal reflux disease)  Taking prilosec  -should improve with weight loss, dietary, and lifestyle changes  -continue management with prescribing provider      ANGEL (obstructive sleep apnea)  -not using cpap because of recall on her CPAP machine   -may improve with 20-30% weight loss        Follow up in approximately 2 weeks with Non-Surgical Dietician  Diagnoses and all orders for this visit:    Obesity, Class II, BMI 35-39 9  -     HEMOGLOBIN A1C W/ EAG ESTIMATION; Future  -     TSH, 3rd generation with Free T4 reflex; Future  -     Insulin, fasting; Future  -     Comprehensive metabolic panel; Future  -     Lipid panel; Future  -     Magnesium; Future    Depression  -     HEMOGLOBIN A1C W/ EAG ESTIMATION; Future  -     TSH, 3rd generation with Free T4 reflex; Future  -     Insulin, fasting; Future  -     Comprehensive metabolic panel; Future  -     Lipid panel;  Future  -     Magnesium; Future    Migraine without aura  -     HEMOGLOBIN A1C W/ EAG ESTIMATION; Future  -     TSH, 3rd generation with Free T4 reflex; Future  -     Insulin, fasting; Future  -     Comprehensive metabolic panel; Future  -     Lipid panel; Future  -     Magnesium; Future    Essential hypertension  -     HEMOGLOBIN A1C W/ EAG ESTIMATION; Future  -     TSH, 3rd generation with Free T4 reflex; Future  -     Insulin, fasting; Future  -     Comprehensive metabolic panel; Future  -     Lipid panel; Future  -     Magnesium; Future    GERD (gastroesophageal reflux disease)  -     HEMOGLOBIN A1C W/ EAG ESTIMATION; Future  -     TSH, 3rd generation with Free T4 reflex; Future  -     Insulin, fasting; Future  -     Comprehensive metabolic panel; Future  -     Lipid panel; Future  -     Magnesium; Future    ANGEL (obstructive sleep apnea)  -     HEMOGLOBIN A1C W/ EAG ESTIMATION; Future  -     TSH, 3rd generation with Free T4 reflex; Future  -     Insulin, fasting; Future  -     Comprehensive metabolic panel; Future  -     Lipid panel; Future  -     Magnesium; Future    History of gestational diabetes  -     HEMOGLOBIN A1C W/ EAG ESTIMATION; Future  -     TSH, 3rd generation with Free T4 reflex; Future  -     Insulin, fasting; Future  -     Comprehensive metabolic panel; Future  -     Lipid panel; Future  -     Magnesium; Future    Other orders  -     diltiazem (CARDIZEM CD) 240 mg 24 hr capsule; Take 240 mg by mouth daily  -     sertraline (ZOLOFT) 100 mg tablet; Take 150 mg by mouth daily  -     Levonorgestrel (Liletta, 52 MG,) 19 5 MCG/DAY IUD IUD; Liletta 20 1 mcg/24 hrs (6 yrs) 52 mg intrauterine device   Take 1 device by intrauterine route  NDC# 0823-8145-72  -     calcium carbonate (TUMS EX) 750 mg chewable tablet;  Chew 3 tablets          Subjective:   Chief Complaint   Patient presents with    Consult     mwm consult       Patient ID: Caleb Colorado  is a 39 y o  female with excess weight/obesity here to pursue weight management  Past Medical History:   Diagnosis Date    Abnormal Pap smear of cervix     ? HPV    Anemia     during pregnancy    Anxiety     currently medicated    Depression     currently medicated    Diabetes mellitus ([facility] [address] )     A2 GDM    Hypertension     Migraine     prior to pregnancy    Psychiatric disorder     Visual impairment     wears corrective lenses       HPI:  Obesity/Excess Weight:  Severity: Severe  Onset:  Since early adulthood but worse in the last 10 years     Modifiers: Diet and Exercise and Over the Counter Weight Loss Supplements  Contributing factors: Stress/Emotional Eating and Insufficient time to make appropriate lifestyle changes, genetics   Associated symptoms: comorbid conditions, fatigue, body image issues, decreased self esteem and depression    Goals:160 lbs   Hydration: 3-4 glasses of water-occasionally adds flavoring, 2 cups of coffee with stevia and creamer   Alcohol: none     Colonoscopy-Not applicable    The following portions of the patient's history were reviewed and updated as appropriate: allergies, current medications, past family history, past medical history, past social history, past surgical history and problem list     Review of Systems   HENT: Negative for sore throat  Respiratory: Negative for cough and shortness of breath  Cardiovascular: Negative for chest pain and palpitations  Gastrointestinal: Negative for abdominal pain, constipation, diarrhea, nausea and vomiting         + GERD-somewhat controlled    Endocrine: Negative for cold intolerance and heat intolerance  Genitourinary: Negative for dysuria  Musculoskeletal: Positive for arthralgias (knees) and back pain  Skin: Negative for rash  Neurological: Positive for headaches (pcp aware )  Psychiatric/Behavioral: Negative for suicidal ideas (denies HI)          + Depression and anxiety-controlled        Objective:    /90 (BP Location: Left arm, Patient Position: Sitting, Cuff Size: Large)   Pulse 85   Temp 98 2 °F (36 8 °C)   Ht 5' 6 5" (1 689 m)   Wt 104 kg (230 lb 1 6 oz)   BMI 36 58 kg/m²     Physical Exam  Vitals and nursing note reviewed  Constitutional   General appearance: Abnormal   well developed and morbidly obese  Eyes No conjunctival pallor  Pulmonary   Respiratory effort: No increased work of breathing or signs of respiratory distress  Abdomen   Abdomen: Abnormal   The abdomen was obese     Musculoskeletal   Gait and station: Normal     Psychiatric   Orientation to person, place and time: Normal     Affect: appropriate

## 2021-06-28 NOTE — ASSESSMENT & PLAN NOTE
-Discussed options of [facility]-Intensive Lifestyle Intervention Program, Very Low Calorie Diet-VLCD, Conservative Program, Karolyn-En-Y Gastric Bypass and Vertical Sleeve Gastrectomy and the role of weight loss medications   -Initial weight loss goal of 5-10% weight loss for improved health  -Screening labs  Recommend checking lab coverage before having labs drawn    -NEEDS Lipids, tsh, a1c, fasting insulin, cmp   -Patient is interested in pursuing menu planning with partial meal replacement -with potential to do vlcd   No phentermine due to SVT  No topamax due to prior Side effects

## 2021-06-28 NOTE — ASSESSMENT & PLAN NOTE
Taking cardizem  -should improve with weight loss, dietary, and lifestyle changes  -continue management with prescribing provider

## 2021-07-22 ENCOUNTER — TELEPHONE (OUTPATIENT)
Dept: BARIATRICS | Facility: CLINIC | Age: 41
End: 2021-07-22

## 2022-03-09 ENCOUNTER — CONSULT (OUTPATIENT)
Dept: SURGERY | Facility: CLINIC | Age: 42
End: 2022-03-09
Payer: COMMERCIAL

## 2022-03-09 VITALS
TEMPERATURE: 97.3 F | HEART RATE: 81 BPM | HEIGHT: 66 IN | WEIGHT: 222.4 LBS | DIASTOLIC BLOOD PRESSURE: 79 MMHG | SYSTOLIC BLOOD PRESSURE: 128 MMHG | BODY MASS INDEX: 35.74 KG/M2 | RESPIRATION RATE: 16 BRPM

## 2022-03-09 DIAGNOSIS — K44.9 HIATAL HERNIA: ICD-10-CM

## 2022-03-09 DIAGNOSIS — E66.9 OBESITY, CLASS II, BMI 35-39.9: ICD-10-CM

## 2022-03-09 DIAGNOSIS — K21.00 GASTROESOPHAGEAL REFLUX DISEASE WITH ESOPHAGITIS WITHOUT HEMORRHAGE: Primary | ICD-10-CM

## 2022-03-09 PROBLEM — K43.9 VENTRAL HERNIA WITHOUT OBSTRUCTION OR GANGRENE: Status: ACTIVE | Noted: 2022-03-09

## 2022-03-09 PROCEDURE — 99204 OFFICE O/P NEW MOD 45 MIN: CPT | Performed by: SURGERY

## 2022-03-09 RX ORDER — TRAZODONE HYDROCHLORIDE 50 MG/1
50 TABLET ORAL
COMMUNITY
End: 2022-06-08

## 2022-03-09 NOTE — PROGRESS NOTES
Assessment/Plan:    Ventral hernia without obstruction or gangrene   I will check a CT scan to evaluate the anatomy of abdominal wall as well as the size of her hiatal hernia  This can help dictate plans for possible repair  Explained to her general options would be either open approach of the hernia if there is a fascial defect versus laparoscopic and possible combined  Hiatal hernia    I reviewed her manometry from 2019 which showed good peristalsis and a mean DCI of 1638  Her upper GI shows moderate hiatal hernia with significant reflux  At this point her last EGD was 3 years ago I recommend proceeding with EGD to evaluate for any changes or any progression of her soft diet as towards parents  After this is done and her CT scan are complete can then for made a plan regarding what is the best surgical option to repair her conditions  Obesity, Class II, BMI 35-39 9   I did discuss gastric bypass surgery in correlation to weight loss as well as anti reflux procedure  However this point she is not interested in this as she has lost some weight on her own would like to continue with her own we       Diagnoses and all orders for this visit:    Gastroesophageal reflux disease with esophagitis without hemorrhage  -     CT abdomen pelvis without contrast; Future  -     EGD; Future    Obesity, Class II, BMI 35-39 9  -     CT abdomen pelvis without contrast; Future    Hiatal hernia  -     CT abdomen pelvis without contrast; Future    Other orders  -     traZODone (DESYREL) 50 mg tablet; Take 50 mg by mouth daily at bedtime Patient taking 25 mg          Subjective:      Patient ID: Iona Montoya is a 43 y o  female  Ms Janette Lindsay   Is a 22-year-old female is here for evaluation of reflux hiatal hernia as well as possible ventral hernia  She had evaluation in 2019 for hiatal hernia underwent manometry study for pH monitoring an EGD    At the time she was having significant reflux symptoms but then never for underwent the surgical repair  Since then she has lost some weight and her reflux symptoms have improved  She still does have reflux symptoms but is managed with her medication  However she feels that she has an epigastric or ventral hernia that is increasing in size and causing more discomfort  It hurts when she is straining or lifting heavy things and she is concerned about this as well  She had lost a fair amount of weight but has regained some weight since she has been on short-term disability from work  The following portions of the patient's history were reviewed and updated as appropriate: allergies, current medications, past family history, past medical history, past social history, past surgical history and problem list     Review of Systems   Constitutional: Negative for chills and fever  HENT: Negative for ear pain and sore throat  Eyes: Negative for pain and visual disturbance  Respiratory: Negative for cough and shortness of breath  Cardiovascular: Negative for chest pain and palpitations  Gastrointestinal: Positive for abdominal pain  Negative for vomiting  Genitourinary: Negative for dysuria and hematuria  Musculoskeletal: Negative for arthralgias and back pain  Skin: Negative for color change and rash  Neurological: Negative for seizures and syncope  Psychiatric/Behavioral: Negative for agitation and behavioral problems  All other systems reviewed and are negative  Objective:      /79   Pulse 81   Temp (!) 97 3 °F (36 3 °C)   Resp 16   Ht 5' 6" (1 676 m)   Wt 101 kg (222 lb 6 4 oz)   BMI 35 90 kg/m²          Physical Exam  Vitals and nursing note reviewed  Constitutional:       General: She is not in acute distress  Appearance: She is well-developed  She is not diaphoretic  HENT:      Head: Normocephalic and atraumatic  Eyes:      Pupils: Pupils are equal, round, and reactive to light     Cardiovascular:      Rate and Rhythm: Normal rate and regular rhythm  Heart sounds: Normal heart sounds  No murmur heard  Pulmonary:      Effort: Pulmonary effort is normal  No respiratory distress  Breath sounds: Normal breath sounds  No wheezing  Abdominal:      General: Bowel sounds are normal       Palpations: Abdomen is soft  Comments:   She has some diastasis and some fullness just above the umbilicus which may be a small ventral or umbilical hernia  Musculoskeletal:         General: Normal range of motion  Cervical back: Normal range of motion and neck supple  Skin:     General: Skin is warm and dry  Neurological:      Mental Status: She is alert and oriented to person, place, and time     Psychiatric:         Behavior: Behavior normal

## 2022-03-09 NOTE — ASSESSMENT & PLAN NOTE
I reviewed her manometry from 2019 which showed good peristalsis and a mean DCI of 1638  Her upper GI shows moderate hiatal hernia with significant reflux  At this point her last EGD was 3 years ago I recommend proceeding with EGD to evaluate for any changes or any progression of her soft diet as towards parents  After this is done and her CT scan are complete can then for made a plan regarding what is the best surgical option to repair her conditions

## 2022-03-09 NOTE — ASSESSMENT & PLAN NOTE
I will check a CT scan to evaluate the anatomy of abdominal wall as well as the size of her hiatal hernia  This can help dictate plans for possible repair  Explained to her general options would be either open approach of the hernia if there is a fascial defect versus laparoscopic and possible combined

## 2022-03-09 NOTE — ASSESSMENT & PLAN NOTE
I did discuss gastric bypass surgery in correlation to weight loss as well as anti reflux procedure    However this point she is not interested in this as she has lost some weight on her own would like to continue with her own we

## 2022-03-09 NOTE — H&P (VIEW-ONLY)
Assessment/Plan:    Ventral hernia without obstruction or gangrene   I will check a CT scan to evaluate the anatomy of abdominal wall as well as the size of her hiatal hernia  This can help dictate plans for possible repair  Explained to her general options would be either open approach of the hernia if there is a fascial defect versus laparoscopic and possible combined  Hiatal hernia    I reviewed her manometry from 2019 which showed good peristalsis and a mean DCI of 1638  Her upper GI shows moderate hiatal hernia with significant reflux  At this point her last EGD was 3 years ago I recommend proceeding with EGD to evaluate for any changes or any progression of her soft diet as towards parents  After this is done and her CT scan are complete can then for made a plan regarding what is the best surgical option to repair her conditions  Obesity, Class II, BMI 35-39 9   I did discuss gastric bypass surgery in correlation to weight loss as well as anti reflux procedure  However this point she is not interested in this as she has lost some weight on her own would like to continue with her own we       Diagnoses and all orders for this visit:    Gastroesophageal reflux disease with esophagitis without hemorrhage  -     CT abdomen pelvis without contrast; Future  -     EGD; Future    Obesity, Class II, BMI 35-39 9  -     CT abdomen pelvis without contrast; Future    Hiatal hernia  -     CT abdomen pelvis without contrast; Future    Other orders  -     traZODone (DESYREL) 50 mg tablet; Take 50 mg by mouth daily at bedtime Patient taking 25 mg          Subjective:      Patient ID: Estuardo Cisse is a 43 y o  female  Ms Lila Bamberger   Is a 59-year-old female is here for evaluation of reflux hiatal hernia as well as possible ventral hernia  She had evaluation in 2019 for hiatal hernia underwent manometry study for pH monitoring an EGD    At the time she was having significant reflux symptoms but then never for underwent the surgical repair  Since then she has lost some weight and her reflux symptoms have improved  She still does have reflux symptoms but is managed with her medication  However she feels that she has an epigastric or ventral hernia that is increasing in size and causing more discomfort  It hurts when she is straining or lifting heavy things and she is concerned about this as well  She had lost a fair amount of weight but has regained some weight since she has been on short-term disability from work  The following portions of the patient's history were reviewed and updated as appropriate: allergies, current medications, past family history, past medical history, past social history, past surgical history and problem list     Review of Systems   Constitutional: Negative for chills and fever  HENT: Negative for ear pain and sore throat  Eyes: Negative for pain and visual disturbance  Respiratory: Negative for cough and shortness of breath  Cardiovascular: Negative for chest pain and palpitations  Gastrointestinal: Positive for abdominal pain  Negative for vomiting  Genitourinary: Negative for dysuria and hematuria  Musculoskeletal: Negative for arthralgias and back pain  Skin: Negative for color change and rash  Neurological: Negative for seizures and syncope  Psychiatric/Behavioral: Negative for agitation and behavioral problems  All other systems reviewed and are negative  Objective:      /79   Pulse 81   Temp (!) 97 3 °F (36 3 °C)   Resp 16   Ht 5' 6" (1 676 m)   Wt 101 kg (222 lb 6 4 oz)   BMI 35 90 kg/m²          Physical Exam  Vitals and nursing note reviewed  Constitutional:       General: She is not in acute distress  Appearance: She is well-developed  She is not diaphoretic  HENT:      Head: Normocephalic and atraumatic  Eyes:      Pupils: Pupils are equal, round, and reactive to light     Cardiovascular:      Rate and Rhythm: Normal rate and regular rhythm  Heart sounds: Normal heart sounds  No murmur heard  Pulmonary:      Effort: Pulmonary effort is normal  No respiratory distress  Breath sounds: Normal breath sounds  No wheezing  Abdominal:      General: Bowel sounds are normal       Palpations: Abdomen is soft  Comments:   She has some diastasis and some fullness just above the umbilicus which may be a small ventral or umbilical hernia  Musculoskeletal:         General: Normal range of motion  Cervical back: Normal range of motion and neck supple  Skin:     General: Skin is warm and dry  Neurological:      Mental Status: She is alert and oriented to person, place, and time     Psychiatric:         Behavior: Behavior normal

## 2022-03-18 ENCOUNTER — ANESTHESIA (OUTPATIENT)
Dept: GASTROENTEROLOGY | Facility: HOSPITAL | Age: 42
End: 2022-03-18

## 2022-03-18 ENCOUNTER — HOSPITAL ENCOUNTER (OUTPATIENT)
Dept: GASTROENTEROLOGY | Facility: HOSPITAL | Age: 42
Setting detail: OUTPATIENT SURGERY
Discharge: HOME | End: 2022-03-18
Attending: SURGERY
Payer: COMMERCIAL

## 2022-03-18 ENCOUNTER — ANESTHESIA EVENT (OUTPATIENT)
Dept: GASTROENTEROLOGY | Facility: HOSPITAL | Age: 42
End: 2022-03-18

## 2022-03-18 VITALS
HEIGHT: 66 IN | SYSTOLIC BLOOD PRESSURE: 117 MMHG | BODY MASS INDEX: 34.07 KG/M2 | DIASTOLIC BLOOD PRESSURE: 56 MMHG | TEMPERATURE: 98.3 F | RESPIRATION RATE: 16 BRPM | WEIGHT: 212 LBS | HEART RATE: 75 BPM | OXYGEN SATURATION: 98 %

## 2022-03-18 DIAGNOSIS — K21.00 GASTROESOPHAGEAL REFLUX DISEASE WITH ESOPHAGITIS WITHOUT HEMORRHAGE: ICD-10-CM

## 2022-03-18 LAB
EXT PREGNANCY TEST URINE: NEGATIVE
EXT. CONTROL: NORMAL

## 2022-03-18 PROCEDURE — 88305 TISSUE EXAM BY PATHOLOGIST: CPT | Performed by: PATHOLOGY

## 2022-03-18 PROCEDURE — 43239 EGD BIOPSY SINGLE/MULTIPLE: CPT | Performed by: SURGERY

## 2022-03-18 PROCEDURE — 81025 URINE PREGNANCY TEST: CPT | Performed by: ANESTHESIOLOGY

## 2022-03-18 RX ORDER — LOSARTAN POTASSIUM 25 MG/1
25 TABLET ORAL DAILY
COMMUNITY

## 2022-03-18 RX ORDER — SODIUM CHLORIDE 9 MG/ML
125 INJECTION, SOLUTION INTRAVENOUS CONTINUOUS
Status: DISCONTINUED | OUTPATIENT
Start: 2022-03-18 | End: 2022-03-22 | Stop reason: HOSPADM

## 2022-03-18 RX ORDER — PROPOFOL 10 MG/ML
INJECTION, EMULSION INTRAVENOUS AS NEEDED
Status: DISCONTINUED | OUTPATIENT
Start: 2022-03-18 | End: 2022-03-18

## 2022-03-18 RX ADMIN — SODIUM CHLORIDE 125 ML/HR: 9 INJECTION, SOLUTION INTRAVENOUS at 12:28

## 2022-03-18 RX ADMIN — PROPOFOL 50 MG: 10 INJECTION, EMULSION INTRAVENOUS at 13:05

## 2022-03-18 RX ADMIN — PROPOFOL 50 MG: 10 INJECTION, EMULSION INTRAVENOUS at 13:11

## 2022-03-18 RX ADMIN — PROPOFOL 50 MG: 10 INJECTION, EMULSION INTRAVENOUS at 13:14

## 2022-03-18 RX ADMIN — PROPOFOL 50 MG: 10 INJECTION, EMULSION INTRAVENOUS at 13:08

## 2022-03-18 RX ADMIN — PROPOFOL 50 MG: 10 INJECTION, EMULSION INTRAVENOUS at 13:10

## 2022-03-18 RX ADMIN — PROPOFOL 100 MG: 10 INJECTION, EMULSION INTRAVENOUS at 13:01

## 2022-03-18 RX ADMIN — PROPOFOL 100 MG: 10 INJECTION, EMULSION INTRAVENOUS at 13:02

## 2022-03-18 NOTE — ANESTHESIA POSTPROCEDURE EVALUATION
Post-Op Assessment Note    CV Status:  Stable    Pain management: adequate     Mental Status:  Alert and awake   Hydration Status:  Euvolemic   PONV Controlled:  Controlled   Airway Patency:  Patent      Post Op Vitals Reviewed: Yes      Staff: Anesthesiologist         No complications documented      /56 (03/18/22 1335)    Temp      Pulse 75 (03/18/22 1335)   Resp 16 (03/18/22 1335)    SpO2 98 % (03/18/22 1335)

## 2022-03-18 NOTE — ANESTHESIA PREPROCEDURE EVALUATION
Procedure:  EGD    Relevant Problems   CARDIO   (+) Essential hypertension   (+) Migraine without aura      GI/HEPATIC   (+) GERD (gastroesophageal reflux disease)   (+) Hiatal hernia      NEURO/PSYCH   (+) Depression   (+) Migraine without aura      PULMONARY   (+) ANGEL (obstructive sleep apnea)      Other   (+) Obesity, Class II, BMI 35-39 9        Physical Exam    Airway    Mallampati score: II  TM Distance: >3 FB  Neck ROM: full     Dental       Cardiovascular  Rhythm: regular, Rate: normal, Cardiovascular exam normal    Pulmonary  Pulmonary exam normal     Other Findings        Anesthesia Plan  ASA Score- 2     Anesthesia Type- general with ASA Monitors  Additional Monitors:   Airway Plan:           Plan Factors-Exercise tolerance (METS): >4 METS  Chart reviewed  Existing labs reviewed  Patient summary reviewed  Patient is not a current smoker  Obstructive sleep apnea risk education given perioperatively  Induction- intravenous  Postoperative Plan-     Informed Consent- Anesthetic plan and risks discussed with patient  I personally reviewed this patient with the CRNA  Discussed and agreed on the Anesthesia Plan with the CRNA  Panda Austin

## 2022-03-18 NOTE — INTERVAL H&P NOTE
H&P reviewed  After examining the patient I find no changes in the patients condition since the H&P had been written      Vitals:    03/18/22 1201   BP: 128/59   Pulse: 77   Resp: 17   Temp: 98 3 °F (36 8 °C)   SpO2: 97%

## 2022-04-03 ENCOUNTER — HOSPITAL ENCOUNTER (OUTPATIENT)
Dept: CT IMAGING | Facility: HOSPITAL | Age: 42
Discharge: HOME | End: 2022-04-03
Attending: SURGERY
Payer: COMMERCIAL

## 2022-04-03 DIAGNOSIS — K21.00 GASTROESOPHAGEAL REFLUX DISEASE WITH ESOPHAGITIS WITHOUT HEMORRHAGE: ICD-10-CM

## 2022-04-03 DIAGNOSIS — E66.9 OBESITY, CLASS II, BMI 35-39.9: ICD-10-CM

## 2022-04-03 DIAGNOSIS — K44.9 HIATAL HERNIA: ICD-10-CM

## 2022-04-03 PROCEDURE — 74176 CT ABD & PELVIS W/O CONTRAST: CPT

## 2022-04-08 ENCOUNTER — TELEPHONE (OUTPATIENT)
Dept: SURGERY | Facility: HOSPITAL | Age: 42
End: 2022-04-08

## 2022-04-08 NOTE — TELEPHONE ENCOUNTER
Phone call to patient, left message for patient to call to schedule f/u appt with Dr Rayna Marquez to discuss surgery

## 2022-05-25 ENCOUNTER — TELEMEDICINE (OUTPATIENT)
Dept: SURGERY | Facility: CLINIC | Age: 42
End: 2022-05-25
Payer: COMMERCIAL

## 2022-05-25 DIAGNOSIS — K43.9 VENTRAL HERNIA WITHOUT OBSTRUCTION OR GANGRENE: ICD-10-CM

## 2022-05-25 DIAGNOSIS — K44.9 PARAESOPHAGEAL HIATAL HERNIA: Primary | ICD-10-CM

## 2022-05-25 DIAGNOSIS — K44.9 HIATAL HERNIA: ICD-10-CM

## 2022-05-25 DIAGNOSIS — K43.2 INCISIONAL HERNIA, WITHOUT OBSTRUCTION OR GANGRENE: ICD-10-CM

## 2022-05-25 PROCEDURE — 99442 PR PHYS/QHP TELEPHONE EVALUATION 11-20 MIN: CPT | Performed by: SURGERY

## 2022-05-27 NOTE — PROGRESS NOTES
Virtual Brief Visit    Patient is located in the following state in which I hold an active license [place]     Assessment/Plan:    Problem List Items Addressed This Visit        Other    Hiatal hernia     She is currently still symptomatic with her large paraesophageal hernia  We discussed options will plan on a repair of her paraesophageal hernia with a Toupet fundoplication at [facility]   The risks benefits alternatives were explained her she is agreeable to proceed  Ventral hernia without obstruction or gangrene     I reviewed the CT scan over the phone with her  She has a very small umbilical hernia which is not symptomatic  She does have a ventral hernia just above the umbilicus that is causing her symptoms  I explained that I can fix at the same time as her hiatal hernia repair  It is unlikely to need mesh but there is a possibility will  She understands this and she would like to have this repaired  Other Visit Diagnoses     Paraesophageal hiatal hernia    -  Primary    Relevant Orders    Case request operating room: REPAIR HERNIA PARAESOPHAGEAL LAPAROSCOPIC W ROBOTICS WITH TOUPET FUNDOPLICATION, REPAIR HERNIA INCISIONAL LAPAROSCOPIC (Completed)    CBC and differential    Type and screen    EKG 12 lead    Incisional hernia, without obstruction or gangrene        Relevant Orders    Case request operating room: REPAIR HERNIA PARAESOPHAGEAL LAPAROSCOPIC W ROBOTICS WITH TOUPET FUNDOPLICATION, REPAIR HERNIA INCISIONAL LAPAROSCOPIC (Completed)    CBC and differential    Type and screen    EKG 12 lead                Recent Visits  Date Type Provider Dept   05/25/22 Telemedicine MD John Joseva 60 recent visits within past 7 days and meeting all other requirements  Future Appointments  No visits were found meeting these conditions    Showing future appointments within next 150 days and meeting all other requirements         I spent 20 minutes directly with the patient during this visit

## 2022-05-27 NOTE — H&P (VIEW-ONLY)
Virtual Brief Visit    Patient is located in the following state in which I hold an active license [place]     Assessment/Plan:    Problem List Items Addressed This Visit        Other    Hiatal hernia     She is currently still symptomatic with her large paraesophageal hernia  We discussed options will plan on a repair of her paraesophageal hernia with a Toupet fundoplication at [address]   The risks benefits alternatives were explained her she is agreeable to proceed  Ventral hernia without obstruction or gangrene     I reviewed the CT scan over the phone with her  She has a very small umbilical hernia which is not symptomatic  She does have a ventral hernia just above the umbilicus that is causing her symptoms  I explained that I can fix at the same time as her hiatal hernia repair  It is unlikely to need mesh but there is a possibility will  She understands this and she would like to have this repaired  Other Visit Diagnoses     Paraesophageal hiatal hernia    -  Primary    Relevant Orders    Case request operating room: REPAIR HERNIA PARAESOPHAGEAL LAPAROSCOPIC W ROBOTICS WITH TOUPET FUNDOPLICATION, REPAIR HERNIA INCISIONAL LAPAROSCOPIC (Completed)    CBC and differential    Type and screen    EKG 12 lead    Incisional hernia, without obstruction or gangrene        Relevant Orders    Case request operating room: REPAIR HERNIA PARAESOPHAGEAL LAPAROSCOPIC W ROBOTICS WITH TOUPET FUNDOPLICATION, REPAIR HERNIA INCISIONAL LAPAROSCOPIC (Completed)    CBC and differential    Type and screen    EKG 12 lead                Recent Visits  Date Type Provider Dept   05/25/22 Telemedicine MD Jina Marsh 60 recent visits within past 7 days and meeting all other requirements  Future Appointments  No visits were found meeting these conditions    Showing future appointments within next 150 days and meeting all other requirements         I spent 20 minutes directly with the patient during this visit

## 2022-05-27 NOTE — ASSESSMENT & PLAN NOTE
She is currently still symptomatic with her large paraesophageal hernia  We discussed options will plan on a repair of her paraesophageal hernia with a Toupet fundoplication at [address] [facility]   The risks benefits alternatives were explained her she is agreeable to proceed

## 2022-05-27 NOTE — ASSESSMENT & PLAN NOTE
I reviewed the CT scan over the phone with her  She has a very small umbilical hernia which is not symptomatic  She does have a ventral hernia just above the umbilicus that is causing her symptoms  I explained that I can fix at the same time as her hiatal hernia repair  It is unlikely to need mesh but there is a possibility will  She understands this and she would like to have this repaired

## 2022-06-03 ENCOUNTER — APPOINTMENT (OUTPATIENT)
Dept: LAB | Facility: MEDICAL CENTER | Age: 42
End: 2022-06-03
Payer: COMMERCIAL

## 2022-06-03 ENCOUNTER — CLINICAL SUPPORT (OUTPATIENT)
Dept: URGENT CARE | Facility: MEDICAL CENTER | Age: 42
End: 2022-06-03
Payer: COMMERCIAL

## 2022-06-03 DIAGNOSIS — K43.2 INCISIONAL HERNIA, WITHOUT OBSTRUCTION OR GANGRENE: ICD-10-CM

## 2022-06-03 DIAGNOSIS — K44.9 PARAESOPHAGEAL HIATAL HERNIA: ICD-10-CM

## 2022-06-03 LAB
ATRIAL RATE: 86 BPM
BASOPHILS # BLD AUTO: 0.03 THOUSANDS/ΜL (ref 0–0.1)
BASOPHILS NFR BLD AUTO: 0 % (ref 0–1)
EOSINOPHIL # BLD AUTO: 0.43 THOUSAND/ΜL (ref 0–0.61)
EOSINOPHIL NFR BLD AUTO: 5 % (ref 0–6)
ERYTHROCYTE [DISTWIDTH] IN BLOOD BY AUTOMATED COUNT: 12.7 % (ref 11.6–15.1)
HCT VFR BLD AUTO: 38.1 % (ref 34.8–46.1)
HGB BLD-MCNC: 12.5 G/DL (ref 11.5–15.4)
IMM GRANULOCYTES # BLD AUTO: 0.02 THOUSAND/UL (ref 0–0.2)
IMM GRANULOCYTES NFR BLD AUTO: 0 % (ref 0–2)
LYMPHOCYTES # BLD AUTO: 2.83 THOUSANDS/ΜL (ref 0.6–4.47)
LYMPHOCYTES NFR BLD AUTO: 33 % (ref 14–44)
MCH RBC QN AUTO: 29.4 PG (ref 26.8–34.3)
MCHC RBC AUTO-ENTMCNC: 32.8 G/DL (ref 31.4–37.4)
MCV RBC AUTO: 90 FL (ref 82–98)
MONOCYTES # BLD AUTO: 0.41 THOUSAND/ΜL (ref 0.17–1.22)
MONOCYTES NFR BLD AUTO: 5 % (ref 4–12)
NEUTROPHILS # BLD AUTO: 4.96 THOUSANDS/ΜL (ref 1.85–7.62)
NEUTS SEG NFR BLD AUTO: 57 % (ref 43–75)
NRBC BLD AUTO-RTO: 0 /100 WBCS
P AXIS: 61 DEGREES
PLATELET # BLD AUTO: 241 THOUSANDS/UL (ref 149–390)
PMV BLD AUTO: 11.3 FL (ref 8.9–12.7)
PR INTERVAL: 144 MS
QRS AXIS: 67 DEGREES
QRSD INTERVAL: 84 MS
QT INTERVAL: 368 MS
QTC INTERVAL: 440 MS
RBC # BLD AUTO: 4.25 MILLION/UL (ref 3.81–5.12)
T WAVE AXIS: 39 DEGREES
VENTRICULAR RATE: 86 BPM
WBC # BLD AUTO: 8.68 THOUSAND/UL (ref 4.31–10.16)

## 2022-06-03 PROCEDURE — 36415 COLL VENOUS BLD VENIPUNCTURE: CPT

## 2022-06-03 PROCEDURE — 86850 RBC ANTIBODY SCREEN: CPT

## 2022-06-03 PROCEDURE — 86901 BLOOD TYPING SEROLOGIC RH(D): CPT

## 2022-06-03 PROCEDURE — 85025 COMPLETE CBC W/AUTO DIFF WBC: CPT

## 2022-06-03 PROCEDURE — 86900 BLOOD TYPING SEROLOGIC ABO: CPT

## 2022-06-03 PROCEDURE — 93010 ELECTROCARDIOGRAM REPORT: CPT

## 2022-06-03 PROCEDURE — 93005 ELECTROCARDIOGRAM TRACING: CPT

## 2022-06-04 LAB
ABO GROUP BLD: NORMAL
BLD GP AB SCN SERPL QL: NEGATIVE
RH BLD: POSITIVE
SPECIMEN EXPIRATION DATE: NORMAL

## 2022-06-07 ENCOUNTER — ANESTHESIA EVENT (OUTPATIENT)
Dept: PERIOP | Facility: HOSPITAL | Age: 42
End: 2022-06-07
Payer: COMMERCIAL

## 2022-06-08 ENCOUNTER — HOSPITAL ENCOUNTER (EMERGENCY)
Facility: HOSPITAL | Age: 42
Discharge: HOME | End: 2022-06-08
Attending: EMERGENCY MEDICINE | Admitting: EMERGENCY MEDICINE
Payer: COMMERCIAL

## 2022-06-08 VITALS
HEART RATE: 74 BPM | OXYGEN SATURATION: 95 % | SYSTOLIC BLOOD PRESSURE: 119 MMHG | DIASTOLIC BLOOD PRESSURE: 58 MMHG | TEMPERATURE: 98.4 F | RESPIRATION RATE: 16 BRPM

## 2022-06-08 DIAGNOSIS — K43.9 VENTRAL HERNIA: Primary | ICD-10-CM

## 2022-06-08 PROCEDURE — 99284 EMERGENCY DEPT VISIT MOD MDM: CPT | Performed by: EMERGENCY MEDICINE

## 2022-06-08 PROCEDURE — 99283 EMERGENCY DEPT VISIT LOW MDM: CPT

## 2022-06-08 NOTE — ED PROVIDER NOTES
History  Chief Complaint   Patient presents with    Abdominal Pain     C/o abd pain that goes across mid section and radiates to her back  States she has multiple hernias and is due to have surgery to repair them at the end of the month  Denies urinary symptoms  Denies n/v/d  A 77-year-old female with past medical history of depression and hypertension; presents with generalized abdominal pain that began around 7 pm this evening  Patient states she does have a known hernia, which she can usually reduce however this evening was unsuccessful and has persistent pain  Patient has since developed nausea but denies vomiting  She was able to have a small nonbloody bowel movement since onset of the pain  Patient otherwise denies fever, chills, chest pain, shortness of breath, dysuria, peripheral edema and rashes  Patient is scheduled for repair of her paraesophageal hernia and ventral wall hernia on 6/16  Assessment & plan:  Generalized abdominal pain, palpable ventral wall hernia appreciated just superior to the umbilicus  Hernia easily reduced with gentle pressure  Patient reports improvement in her pain  Will continue to monitor in the ED to determine for recurrence of pain  History provided by:  Patient and medical records    Prior to Admission Medications   Prescriptions Last Dose Informant Patient Reported? Taking? Levonorgestrel (Liletta, 52 MG,) 19 5 MCG/DAY IUD IUD  Self Yes No   Sig: Liletta 20 1 mcg/24 hrs (6 yrs) 52 mg intrauterine device   Take 1 device by intrauterine route     NDC# 9807-0568-05   calcium carbonate (TUMS EX) 750 mg chewable tablet  Self Yes No   Sig: Chew 3 tablets   diltiazem (CARDIZEM CD) 240 mg 24 hr capsule  Self Yes Yes   Sig: Take 240 mg by mouth daily   ibuprofen (MOTRIN) 600 mg tablet  Self No No   Sig: Take 1 tablet (600 mg total) by mouth every 6 (six) hours as needed for moderate pain   losartan (COZAAR) 25 mg tablet  Self Yes Yes   Sig: Take 25 mg by mouth daily   omeprazole (PriLOSEC) 40 MG capsule  Self Yes Yes   Sig: Take 40 mg by mouth 2 (two) times a day   rizatriptan (MAXALT) 10 MG tablet  Self Yes Yes   Sig: Take 10 mg by mouth once as needed for migraine May repeat in 2 hours if needed   sertraline (ZOLOFT) 100 mg tablet  Self Yes Yes   Sig: Take 150 mg by mouth daily      Facility-Administered Medications: None       Past Medical History:   Diagnosis Date    Abnormal Pap smear of cervix     ? HPV    Anemia     during pregnancy    Anxiety     currently medicated    Depression     currently medicated    Diabetes mellitus ([facility] [address] )     A2 GDM    Hypertension     Migraine     prior to pregnancy    Psychiatric disorder     Visual impairment     wears corrective lenses       Past Surgical History:   Procedure Laterality Date    ABDOMINAL SURGERY      CERVICAL BIOPSY  W/ LOOP ELECTRODE EXCISION      2008       Family History   Problem Relation Age of Onset    Diabetes Mother     Hypertension Mother     Stroke Mother         about 12 years ago as of 3/22/2018    Cancer Father     Esophageal cancer Father     No Known Problems Sister     No Known Problems Sister     No Known Problems Brother     No Known Problems Maternal Grandmother     No Known Problems Maternal Grandfather     No Known Problems Paternal Grandmother     Heart disease Paternal Grandfather     Thyroid disease Neg Hx      I have reviewed and agree with the history as documented  E-Cigarette/Vaping    E-Cigarette Use Never User      E-Cigarette/Vaping Substances     Social History     Tobacco Use    Smoking status: Never Smoker    Smokeless tobacco: Never Used   Vaping Use    Vaping Use: Never used   Substance Use Topics    Alcohol use: No    Drug use: No       Review of Systems   Gastrointestinal: Positive for abdominal pain  All other systems reviewed and are negative        Physical Exam  Physical Exam  General Appearance: alert and oriented, appears uncomfortable, non toxic appearing  Skin:  Warm, dry, intact  No cyanosis  HEENT: Atraumatic, normocephalic  No eye drainage  Normal hearing  Moist mucous membranes  Neck: Supple, trachea midline  Cardiac: RRR; no murmurs, rub, gallops  No pedal edema, 2+ pulses  Pulmonary: lungs CTAB; no wheezes, rales, rhonchi  Gastrointestinal: abdomen soft  Palpable ventral wall hernia just superior to the umbilicus, tender to palpation, no overlying erythema, easily reduced  Nondistended; no guarding or rebound tenderness; good bowel sounds, no mass or bruits  Extremities:  No deformities  No calf tenderness, no clubbing  Neuro:  no focal motor or sensory deficits, CN 2-12 grossly intact  Psych:  Normal mood and affect, normal judgement and insight      Vital Signs  ED Triage Vitals [06/08/22 0054]   Temperature Pulse Respirations Blood Pressure SpO2   98 4 °F (36 9 °C) 101 19 145/84 97 %      Temp Source Heart Rate Source Patient Position - Orthostatic VS BP Location FiO2 (%)   Oral Monitor Sitting Right arm --      Pain Score       10 - Worst Possible Pain           Vitals:    06/08/22 0054 06/08/22 0114 06/08/22 0130   BP: 145/84  119/58   Pulse: 101 78 74   Patient Position - Orthostatic VS: Sitting  Lying         Visual Acuity      ED Medications  Medications - No data to display    Diagnostic Studies  Results Reviewed     None                 No orders to display              Procedures  Procedures         ED Course  ED Course as of 06/08/22 0155   Wed Jun 08, 2022   0145 Pt reports complete resolution of pain  Will ambulate prior to discharge  SBIRT 22yo+    Flowsheet Row Most Recent Value   SBIRT (25 yo +)    In order to provide better care to our patients, we are screening all of our patients for alcohol and drug use  Would it be okay to ask you these screening questions?  No Filed at: 06/08/2022 0105                    MDM    Disposition  Final diagnoses:   Ventral hernia     Time reflects when diagnosis was documented in both MDM as applicable and the Disposition within this note     Time User Action Codes Description Comment    6/8/2022  1:50 AM Amador Augustine Add [K43 9] Ventral hernia       ED Disposition     ED Disposition   Discharge    Condition   Stable    Date/Time   Wed Jun 8, 2022  1:50 AM    Comment   Aline Smith discharge to home/self care  Follow-up Information     Follow up With Specialties Details Why Contact Info    Ange Haas MD General Surgery, Wound Care Go to  Previously scheduled surgery next week [address] [place] [address]            Current Discharge Medication List      CONTINUE these medications which have NOT CHANGED    Details   diltiazem (CARDIZEM CD) 240 mg 24 hr capsule Take 240 mg by mouth daily      losartan (COZAAR) 25 mg tablet Take 25 mg by mouth daily      omeprazole (PriLOSEC) 40 MG capsule Take 40 mg by mouth 2 (two) times a day      rizatriptan (MAXALT) 10 MG tablet Take 10 mg by mouth once as needed for migraine May repeat in 2 hours if needed      sertraline (ZOLOFT) 100 mg tablet Take 150 mg by mouth daily      calcium carbonate (TUMS EX) 750 mg chewable tablet Chew 3 tablets      ibuprofen (MOTRIN) 600 mg tablet Take 1 tablet (600 mg total) by mouth every 6 (six) hours as needed for moderate pain  Qty: 30 tablet, Refills: 0    Associated Diagnoses: Vacuum extractor delivery, delivered      Levonorgestrel (Liletta, 52 MG,) 19 5 MCG/DAY IUD IUD Liletta 20 1 mcg/24 hrs (6 yrs) 52 mg intrauterine device   Take 1 device by intrauterine route  NDC# 2551-3420-54             No discharge procedures on file      PDMP Review     None          ED Provider  Electronically Signed by           Trino Patel DO  06/08/22 2967

## 2022-06-08 NOTE — Clinical Note
Cordell Mccormack was seen and treated in our emergency department on 6/8/2022  No restrictions            Diagnosis:     Manisha Kelly  may return to work on return date  She may return on this date: 06/09/2022         If you have any questions or concerns, please don't hesitate to call        Josephine Xiao RN    ______________________________           _______________          _______________  Hospital Representative                              Date                                Time

## 2022-06-08 NOTE — ED NOTES
Patient able to successfully ambulate without pain after successful hernia reduction       Jahaira Armstrong RN  06/08/22 0079 [facility], RN  06/08/22 0445

## 2022-06-13 NOTE — PRE-PROCEDURE INSTRUCTIONS
Pre-Surgery Instructions:   Medication Instructions    calcium carbonate (TUMS EX) 750 mg chewable tablet Instructed to use as needed up to night before surgery     diltiazem (CARDIZEM CD) 240 mg 24 hr capsule Take night before surgery    ibuprofen (MOTRIN) 600 mg tablet Stop taking 3 days prior to surgery  Start HOLD today 6/13    Levonorgestrel (Liletta, 52 MG,) 19 5 MCG/DAY IUD IUD Pt reports IUD is in place at this time    losartan (COZAAR) 25 mg tablet Hold day of surgery   omeprazole (PriLOSEC) 40 MG capsule Take night before surgery    rizatriptan (MAXALT) 10 MG tablet Instructed may use as needed up to night before surgery if needed     sertraline (ZOLOFT) 100 mg tablet Take night before surgery     Reviewed all medications and instructions for DOS  Reviewed all showering instructions and COVID visitation policy  Pt aware that [facility]  is location for DOS, instructed that pt pre op nurse will call on 6/15/22 to give specific instructions for DOS  Pt instructed to bring photo ID and insurance card for DOS, remove all jewelry and  NO valuables for DOS  Pt instructed to use only Tylenol between now 6/13/22 and DOS, NO NSAID products  Pt informed transport is needed for DOS due to receiving anesthesia  Pt verbalized understanding of all instructions given and reviewed for DOS  Pt is fully vaccinated for  COVID at this time  My Surgical Experience    The following information was developed to assist you to prepare for your operation  What do I need to do before coming to the hospital?   Arrange for a responsible person to drive you to and from the hospital    Arrange care for your children at home  Children are not allowed in the recovery areas of the hospital   Plan to wear clothing that is easy to put on and take off   If you are having shoulder surgery, wear a shirt that buttons or zippers in the front  Bathing  o Shower the evening before and the morning of your surgery with an antibacterial soap  Please refer to the Pre Op Showering Instructions for Surgery Patients Sheet   o Remove nail polish and all body piercing jewelry  o Do not shave any body part for at least 24 hours before surgery-this includes face, arms, legs and upper body  Food  o Nothing to eat or drink after midnight the night before your surgery  This includes candy and chewing gum  o Exception: If your surgery is after 12:00pm (noon), you may have clear liquids such as 7-Up®, ginger ale, apple or cranberry juice, Jell-O®, water, or clear broth until 8:00 am  o Do not drink milk or juice with pulp on the morning before surgery  o Do not drink alcohol 24 hours before surgery  Medicine  o Follow instructions you received from your surgeon about which medicines you may take on the day of surgery  o If instructed to take medicine on the morning of surgery, take pills with just a small sip of water  Call your prescribing doctor for specific infroamtion on what to do if you take insulin    What should I bring to the hospital?    Bring:  Mary Sprung or a walker, if you have them, for foot or knee surgery   A list of the daily medicines, vitamins, minerals, herbals and nutritional supplements you take  Include the dosages of medicines and the time you take them each day   Glasses, dentures or hearing aids   Minimal clothing; you will be wearing hospital sleepwear   Photo ID; required to verify your identity   If you have a Living Will or Power of , bring a copy of the documents   If you have an ostomy, bring an extra pouch and any supplies you use    Do not bring   Medicines or inhalers   Money, valuables or jewelry    What other information should I know about the day of surgery?  Notify your surgeons if you develop a cold, sore throat, cough, fever, rash or any other illness     Report to the Ambulatory Surgical/Same Day Surgery Unit   You will be instructed to stop at Registration only if you have not been pre-registered   Inform your  fi they do not stay that they will be asked by the staff to leave a phone number where they can be reached   Be available to be reached before surgery  In the event the operating room schedule changes, you may be asked to come in earlier or later than expected    *It is important to tell your doctor and others involved in your health care if you are taking or have been taking any non-prescription drugs, vitamins, minerals, herbals or other nutritional supplements   Any of these may interact with some food or medicines and cause a reaction

## 2022-06-16 ENCOUNTER — ANESTHESIA (OUTPATIENT)
Dept: PERIOP | Facility: HOSPITAL | Age: 42
End: 2022-06-16
Payer: COMMERCIAL

## 2022-06-16 ENCOUNTER — HOSPITAL ENCOUNTER (OUTPATIENT)
Facility: HOSPITAL | Age: 42
Setting detail: OUTPATIENT SURGERY
Discharge: HOME | End: 2022-06-17
Attending: SURGERY | Admitting: SURGERY
Payer: COMMERCIAL

## 2022-06-16 DIAGNOSIS — K44.9 PARAESOPHAGEAL HIATAL HERNIA: ICD-10-CM

## 2022-06-16 DIAGNOSIS — K44.9 HIATAL HERNIA: Primary | ICD-10-CM

## 2022-06-16 DIAGNOSIS — K43.2 INCISIONAL HERNIA, WITHOUT OBSTRUCTION OR GANGRENE: ICD-10-CM

## 2022-06-16 LAB
EXT PREGNANCY TEST URINE: NEGATIVE
EXT. CONTROL: NORMAL
PLATELET # BLD AUTO: 230 THOUSANDS/UL (ref 149–390)
PMV BLD AUTO: 11 FL (ref 8.9–12.7)

## 2022-06-16 PROCEDURE — 88302 TISSUE EXAM BY PATHOLOGIST: CPT | Performed by: PATHOLOGY

## 2022-06-16 PROCEDURE — NC001 PR NO CHARGE: Performed by: PHYSICIAN ASSISTANT

## 2022-06-16 PROCEDURE — 81025 URINE PREGNANCY TEST: CPT | Performed by: STUDENT IN AN ORGANIZED HEALTH CARE EDUCATION/TRAINING PROGRAM

## 2022-06-16 PROCEDURE — S2900 ROBOTIC SURGICAL SYSTEM: HCPCS | Performed by: SURGERY

## 2022-06-16 PROCEDURE — C9113 INJ PANTOPRAZOLE SODIUM, VIA: HCPCS | Performed by: SURGERY

## 2022-06-16 PROCEDURE — 43282 LAP PARAESOPH HER RPR W/MESH: CPT | Performed by: SURGERY

## 2022-06-16 PROCEDURE — 85049 AUTOMATED PLATELET COUNT: CPT | Performed by: SURGERY

## 2022-06-16 PROCEDURE — C1781 MESH (IMPLANTABLE): HCPCS | Performed by: SURGERY

## 2022-06-16 PROCEDURE — NC001 PR NO CHARGE: Performed by: SURGERY

## 2022-06-16 DEVICE — BIO-A TISSUE REINFORCEMENT 7CMX10CM
Type: IMPLANTABLE DEVICE | Site: ABDOMEN | Status: FUNCTIONAL
Brand: GORE BIO-A TISSUE REINFORCEMENT

## 2022-06-16 RX ORDER — HEPARIN SODIUM 5000 [USP'U]/ML
5000 INJECTION, SOLUTION INTRAVENOUS; SUBCUTANEOUS EVERY 8 HOURS SCHEDULED
Status: DISCONTINUED | OUTPATIENT
Start: 2022-06-16 | End: 2022-06-17 | Stop reason: HOSPADM

## 2022-06-16 RX ORDER — CEFAZOLIN SODIUM 2 G/50ML
SOLUTION INTRAVENOUS AS NEEDED
Status: DISCONTINUED | OUTPATIENT
Start: 2022-06-16 | End: 2022-06-16

## 2022-06-16 RX ORDER — MEPERIDINE HYDROCHLORIDE 25 MG/ML
12.5 INJECTION INTRAMUSCULAR; INTRAVENOUS; SUBCUTANEOUS
Status: DISCONTINUED | OUTPATIENT
Start: 2022-06-16 | End: 2022-06-16 | Stop reason: HOSPADM

## 2022-06-16 RX ORDER — SODIUM CHLORIDE, SODIUM LACTATE, POTASSIUM CHLORIDE, CALCIUM CHLORIDE 600; 310; 30; 20 MG/100ML; MG/100ML; MG/100ML; MG/100ML
125 INJECTION, SOLUTION INTRAVENOUS CONTINUOUS
Status: DISCONTINUED | OUTPATIENT
Start: 2022-06-16 | End: 2022-06-17

## 2022-06-16 RX ORDER — METOCLOPRAMIDE HYDROCHLORIDE 5 MG/ML
10 INJECTION INTRAMUSCULAR; INTRAVENOUS EVERY 6 HOURS PRN
Status: DISCONTINUED | OUTPATIENT
Start: 2022-06-16 | End: 2022-06-17 | Stop reason: HOSPADM

## 2022-06-16 RX ORDER — SODIUM CHLORIDE 9 MG/ML
INJECTION, SOLUTION INTRAVENOUS CONTINUOUS PRN
Status: DISCONTINUED | OUTPATIENT
Start: 2022-06-16 | End: 2022-06-16

## 2022-06-16 RX ORDER — LIDOCAINE HYDROCHLORIDE 20 MG/ML
INJECTION, SOLUTION EPIDURAL; INFILTRATION; INTRACAUDAL; PERINEURAL AS NEEDED
Status: DISCONTINUED | OUTPATIENT
Start: 2022-06-16 | End: 2022-06-16

## 2022-06-16 RX ORDER — HYDROMORPHONE HCL/PF 1 MG/ML
0.5 SYRINGE (ML) INJECTION
Status: DISCONTINUED | OUTPATIENT
Start: 2022-06-16 | End: 2022-06-16 | Stop reason: HOSPADM

## 2022-06-16 RX ORDER — DIPHENHYDRAMINE HYDROCHLORIDE 50 MG/ML
INJECTION INTRAMUSCULAR; INTRAVENOUS AS NEEDED
Status: DISCONTINUED | OUTPATIENT
Start: 2022-06-16 | End: 2022-06-16

## 2022-06-16 RX ORDER — ONDANSETRON 2 MG/ML
4 INJECTION INTRAMUSCULAR; INTRAVENOUS EVERY 6 HOURS SCHEDULED
Status: DISCONTINUED | OUTPATIENT
Start: 2022-06-16 | End: 2022-06-17 | Stop reason: HOSPADM

## 2022-06-16 RX ORDER — SODIUM CHLORIDE, SODIUM LACTATE, POTASSIUM CHLORIDE, CALCIUM CHLORIDE 600; 310; 30; 20 MG/100ML; MG/100ML; MG/100ML; MG/100ML
50 INJECTION, SOLUTION INTRAVENOUS CONTINUOUS
Status: DISCONTINUED | OUTPATIENT
Start: 2022-06-16 | End: 2022-06-16

## 2022-06-16 RX ORDER — KETOROLAC TROMETHAMINE 30 MG/ML
INJECTION, SOLUTION INTRAMUSCULAR; INTRAVENOUS AS NEEDED
Status: DISCONTINUED | OUTPATIENT
Start: 2022-06-16 | End: 2022-06-16

## 2022-06-16 RX ORDER — HYDROMORPHONE HCL/PF 1 MG/ML
0.5 SYRINGE (ML) INJECTION
Status: DISCONTINUED | OUTPATIENT
Start: 2022-06-16 | End: 2022-06-17 | Stop reason: HOSPADM

## 2022-06-16 RX ORDER — DEXAMETHASONE SODIUM PHOSPHATE 10 MG/ML
INJECTION, SOLUTION INTRAMUSCULAR; INTRAVENOUS AS NEEDED
Status: DISCONTINUED | OUTPATIENT
Start: 2022-06-16 | End: 2022-06-16

## 2022-06-16 RX ORDER — OXYCODONE HCL 5 MG/5 ML
5 SOLUTION, ORAL ORAL EVERY 4 HOURS PRN
Status: DISCONTINUED | OUTPATIENT
Start: 2022-06-16 | End: 2022-06-16

## 2022-06-16 RX ORDER — MIDAZOLAM HYDROCHLORIDE 2 MG/2ML
INJECTION, SOLUTION INTRAMUSCULAR; INTRAVENOUS AS NEEDED
Status: DISCONTINUED | OUTPATIENT
Start: 2022-06-16 | End: 2022-06-16

## 2022-06-16 RX ORDER — OXYCODONE HCL 5 MG/5 ML
5 SOLUTION, ORAL ORAL EVERY 4 HOURS PRN
Status: DISCONTINUED | OUTPATIENT
Start: 2022-06-16 | End: 2022-06-17 | Stop reason: HOSPADM

## 2022-06-16 RX ORDER — PROPOFOL 10 MG/ML
INJECTION, EMULSION INTRAVENOUS AS NEEDED
Status: DISCONTINUED | OUTPATIENT
Start: 2022-06-16 | End: 2022-06-16

## 2022-06-16 RX ORDER — FENTANYL CITRATE/PF 50 MCG/ML
25 SYRINGE (ML) INJECTION
Status: DISCONTINUED | OUTPATIENT
Start: 2022-06-16 | End: 2022-06-16 | Stop reason: HOSPADM

## 2022-06-16 RX ORDER — BUPIVACAINE HYDROCHLORIDE 5 MG/ML
INJECTION, SOLUTION EPIDURAL; INTRACAUDAL AS NEEDED
Status: DISCONTINUED | OUTPATIENT
Start: 2022-06-16 | End: 2022-06-16 | Stop reason: HOSPADM

## 2022-06-16 RX ORDER — EPHEDRINE SULFATE 50 MG/ML
INJECTION INTRAVENOUS AS NEEDED
Status: DISCONTINUED | OUTPATIENT
Start: 2022-06-16 | End: 2022-06-16

## 2022-06-16 RX ORDER — PHENYLEPHRINE HYDROCHLORIDE 10 MG/ML
INJECTION INTRAVENOUS AS NEEDED
Status: DISCONTINUED | OUTPATIENT
Start: 2022-06-16 | End: 2022-06-16

## 2022-06-16 RX ORDER — ONDANSETRON 2 MG/ML
INJECTION INTRAMUSCULAR; INTRAVENOUS AS NEEDED
Status: DISCONTINUED | OUTPATIENT
Start: 2022-06-16 | End: 2022-06-16

## 2022-06-16 RX ORDER — ONDANSETRON 2 MG/ML
4 INJECTION INTRAMUSCULAR; INTRAVENOUS ONCE AS NEEDED
Status: DISCONTINUED | OUTPATIENT
Start: 2022-06-16 | End: 2022-06-16 | Stop reason: HOSPADM

## 2022-06-16 RX ORDER — ACETAMINOPHEN 650 MG/20.3ML
650 SUSPENSION ORAL EVERY 4 HOURS PRN
Status: DISCONTINUED | OUTPATIENT
Start: 2022-06-16 | End: 2022-06-17 | Stop reason: HOSPADM

## 2022-06-16 RX ORDER — FENTANYL CITRATE 50 UG/ML
INJECTION, SOLUTION INTRAMUSCULAR; INTRAVENOUS AS NEEDED
Status: DISCONTINUED | OUTPATIENT
Start: 2022-06-16 | End: 2022-06-16

## 2022-06-16 RX ORDER — CEFAZOLIN SODIUM 2 G/50ML
2000 SOLUTION INTRAVENOUS ONCE
Status: DISCONTINUED | OUTPATIENT
Start: 2022-06-16 | End: 2022-06-16 | Stop reason: HOSPADM

## 2022-06-16 RX ORDER — CEFAZOLIN SODIUM 2 G/50ML
2000 SOLUTION INTRAVENOUS EVERY 8 HOURS
Status: DISCONTINUED | OUTPATIENT
Start: 2022-06-16 | End: 2022-06-16

## 2022-06-16 RX ORDER — LIDOCAINE HYDROCHLORIDE 10 MG/ML
0.5 INJECTION, SOLUTION EPIDURAL; INFILTRATION; INTRACAUDAL; PERINEURAL ONCE AS NEEDED
Status: DISCONTINUED | OUTPATIENT
Start: 2022-06-16 | End: 2022-06-16 | Stop reason: HOSPADM

## 2022-06-16 RX ORDER — PANTOPRAZOLE SODIUM 40 MG/10ML
40 INJECTION, POWDER, LYOPHILIZED, FOR SOLUTION INTRAVENOUS
Status: DISCONTINUED | OUTPATIENT
Start: 2022-06-16 | End: 2022-06-17 | Stop reason: HOSPADM

## 2022-06-16 RX ORDER — MAGNESIUM HYDROXIDE 1200 MG/15ML
LIQUID ORAL AS NEEDED
Status: DISCONTINUED | OUTPATIENT
Start: 2022-06-16 | End: 2022-06-16 | Stop reason: HOSPADM

## 2022-06-16 RX ORDER — DILTIAZEM HYDROCHLORIDE 240 MG/1
240 CAPSULE, COATED, EXTENDED RELEASE ORAL DAILY
Status: DISCONTINUED | OUTPATIENT
Start: 2022-06-16 | End: 2022-06-17 | Stop reason: HOSPADM

## 2022-06-16 RX ORDER — OXYCODONE HCL 5 MG/5 ML
10 SOLUTION, ORAL ORAL EVERY 4 HOURS PRN
Status: DISCONTINUED | OUTPATIENT
Start: 2022-06-16 | End: 2022-06-17 | Stop reason: HOSPADM

## 2022-06-16 RX ORDER — OXYCODONE HCL 5 MG/5 ML
10 SOLUTION, ORAL ORAL EVERY 4 HOURS PRN
Status: DISCONTINUED | OUTPATIENT
Start: 2022-06-16 | End: 2022-06-16

## 2022-06-16 RX ORDER — ROCURONIUM BROMIDE 10 MG/ML
INJECTION, SOLUTION INTRAVENOUS AS NEEDED
Status: DISCONTINUED | OUTPATIENT
Start: 2022-06-16 | End: 2022-06-16

## 2022-06-16 RX ADMIN — ROCURONIUM BROMIDE 20 MG: 50 INJECTION, SOLUTION INTRAVENOUS at 12:52

## 2022-06-16 RX ADMIN — PANTOPRAZOLE SODIUM 40 MG: 40 INJECTION, POWDER, FOR SOLUTION INTRAVENOUS at 15:39

## 2022-06-16 RX ADMIN — FENTANYL CITRATE 25 MCG: 50 INJECTION, SOLUTION INTRAMUSCULAR; INTRAVENOUS at 14:50

## 2022-06-16 RX ADMIN — ONDANSETRON 4 MG: 2 INJECTION INTRAMUSCULAR; INTRAVENOUS at 17:02

## 2022-06-16 RX ADMIN — MIDAZOLAM 2 MG: 1 INJECTION INTRAMUSCULAR; INTRAVENOUS at 10:50

## 2022-06-16 RX ADMIN — PHENYLEPHRINE HYDROCHLORIDE 100 MCG: 10 INJECTION INTRAVENOUS at 11:46

## 2022-06-16 RX ADMIN — SUGAMMADEX 200 MG: 100 INJECTION, SOLUTION INTRAVENOUS at 13:55

## 2022-06-16 RX ADMIN — PROPOFOL 150 MG: 10 INJECTION, EMULSION INTRAVENOUS at 10:55

## 2022-06-16 RX ADMIN — ROCURONIUM BROMIDE 20 MG: 50 INJECTION, SOLUTION INTRAVENOUS at 13:21

## 2022-06-16 RX ADMIN — SODIUM CHLORIDE, SODIUM LACTATE, POTASSIUM CHLORIDE, AND CALCIUM CHLORIDE: .6; .31; .03; .02 INJECTION, SOLUTION INTRAVENOUS at 11:59

## 2022-06-16 RX ADMIN — DIPHENHYDRAMINE HYDROCHLORIDE 25 MG: 50 INJECTION, SOLUTION INTRAMUSCULAR; INTRAVENOUS at 11:06

## 2022-06-16 RX ADMIN — FENTANYL CITRATE 25 MCG: 50 INJECTION, SOLUTION INTRAMUSCULAR; INTRAVENOUS at 14:25

## 2022-06-16 RX ADMIN — SERTRALINE HYDROCHLORIDE 150 MG: 100 TABLET ORAL at 15:37

## 2022-06-16 RX ADMIN — CEFAZOLIN SODIUM 2000 MG: 2 SOLUTION INTRAVENOUS at 11:14

## 2022-06-16 RX ADMIN — HYDROMORPHONE HYDROCHLORIDE 0.5 MG: 1 INJECTION, SOLUTION INTRAMUSCULAR; INTRAVENOUS; SUBCUTANEOUS at 19:15

## 2022-06-16 RX ADMIN — KETOROLAC TROMETHAMINE 15 MG: 30 INJECTION, SOLUTION INTRAMUSCULAR; INTRAVENOUS at 13:54

## 2022-06-16 RX ADMIN — HEPARIN SODIUM 5000 UNITS: 5000 INJECTION INTRAVENOUS; SUBCUTANEOUS at 22:59

## 2022-06-16 RX ADMIN — PHENYLEPHRINE HYDROCHLORIDE 30 MCG/MIN: 10 INJECTION INTRAVENOUS at 12:21

## 2022-06-16 RX ADMIN — OXYCODONE HYDROCHLORIDE 5 MG: 5 SOLUTION ORAL at 16:53

## 2022-06-16 RX ADMIN — PHENYLEPHRINE HYDROCHLORIDE 100 MCG: 10 INJECTION INTRAVENOUS at 12:02

## 2022-06-16 RX ADMIN — SODIUM CHLORIDE, SODIUM LACTATE, POTASSIUM CHLORIDE, AND CALCIUM CHLORIDE 50 ML/HR: .6; .31; .03; .02 INJECTION, SOLUTION INTRAVENOUS at 15:16

## 2022-06-16 RX ADMIN — PHENYLEPHRINE HYDROCHLORIDE 200 MCG: 10 INJECTION INTRAVENOUS at 12:17

## 2022-06-16 RX ADMIN — PHENYLEPHRINE HYDROCHLORIDE 100 MCG: 10 INJECTION INTRAVENOUS at 12:39

## 2022-06-16 RX ADMIN — SODIUM CHLORIDE, SODIUM LACTATE, POTASSIUM CHLORIDE, AND CALCIUM CHLORIDE 50 ML/HR: .6; .31; .03; .02 INJECTION, SOLUTION INTRAVENOUS at 09:21

## 2022-06-16 RX ADMIN — ROCURONIUM BROMIDE 20 MG: 50 INJECTION, SOLUTION INTRAVENOUS at 11:59

## 2022-06-16 RX ADMIN — SODIUM CHLORIDE, SODIUM LACTATE, POTASSIUM CHLORIDE, AND CALCIUM CHLORIDE 125 ML/HR: .6; .31; .03; .02 INJECTION, SOLUTION INTRAVENOUS at 23:07

## 2022-06-16 RX ADMIN — HEPARIN SODIUM 5000 UNITS: 5000 INJECTION INTRAVENOUS; SUBCUTANEOUS at 15:38

## 2022-06-16 RX ADMIN — ACETAMINOPHEN 650 MG: 650 SUSPENSION ORAL at 18:11

## 2022-06-16 RX ADMIN — ONDANSETRON 4 MG: 2 INJECTION INTRAMUSCULAR; INTRAVENOUS at 23:45

## 2022-06-16 RX ADMIN — ROCURONIUM BROMIDE 50 MG: 50 INJECTION, SOLUTION INTRAVENOUS at 10:56

## 2022-06-16 RX ADMIN — SODIUM CHLORIDE: 0.9 INJECTION, SOLUTION INTRAVENOUS at 11:00

## 2022-06-16 RX ADMIN — PHENYLEPHRINE HYDROCHLORIDE 200 MCG: 10 INJECTION INTRAVENOUS at 11:28

## 2022-06-16 RX ADMIN — OXYCODONE HYDROCHLORIDE 10 MG: 5 SOLUTION ORAL at 22:59

## 2022-06-16 RX ADMIN — SODIUM CHLORIDE, SODIUM LACTATE, POTASSIUM CHLORIDE, AND CALCIUM CHLORIDE 125 ML/HR: .6; .31; .03; .02 INJECTION, SOLUTION INTRAVENOUS at 15:27

## 2022-06-16 RX ADMIN — FENTANYL CITRATE 100 MCG: 50 INJECTION, SOLUTION INTRAMUSCULAR; INTRAVENOUS at 10:55

## 2022-06-16 RX ADMIN — EPHEDRINE SULFATE 10 MG: 50 INJECTION, SOLUTION INTRAVENOUS at 12:02

## 2022-06-16 RX ADMIN — DEXAMETHASONE SODIUM PHOSPHATE 5 MG: 10 INJECTION, SOLUTION INTRAMUSCULAR; INTRAVENOUS at 11:31

## 2022-06-16 RX ADMIN — METOCLOPRAMIDE 10 MG: 5 INJECTION, SOLUTION INTRAMUSCULAR; INTRAVENOUS at 19:15

## 2022-06-16 RX ADMIN — ONDANSETRON 4 MG: 2 INJECTION INTRAMUSCULAR; INTRAVENOUS at 13:52

## 2022-06-16 RX ADMIN — ROCURONIUM BROMIDE 10 MG: 50 INJECTION, SOLUTION INTRAVENOUS at 12:14

## 2022-06-16 RX ADMIN — PHENYLEPHRINE HYDROCHLORIDE 100 MCG: 10 INJECTION INTRAVENOUS at 12:36

## 2022-06-16 RX ADMIN — ROCURONIUM BROMIDE 20 MG: 50 INJECTION, SOLUTION INTRAVENOUS at 11:25

## 2022-06-16 RX ADMIN — LIDOCAINE HYDROCHLORIDE 100 MG: 20 INJECTION, SOLUTION EPIDURAL; INFILTRATION; INTRACAUDAL at 10:55

## 2022-06-16 NOTE — PROGRESS NOTES
General Surgery  Post Op Check  Tiny Linda 43 y o  female MRN: [id number]  Unit/Bed#: E2 -01 Encounter: [id number]    Assessment:  43year old female POD 0 s/p laparoscopic with robotic hiatal hernia repair, ventral hernia repair 2/2 paraesophageal hiatal hernia    Plan:   Diet as tolerated   Plan for UGI tomorrow   DVT ppx: SQH   Pain/ nausea control PRN   OOB/ ambulation   Incentive Spirometry    Subjective/Objective     Subjective: Patient seen and examined at bedside, in no acute distress  Pain is well controlled  Tolerating clear liquid diet, denies vomiting  Reports intermittent nausea  Objective:     Vitals:Blood pressure 118/56, pulse 80, temperature 98 3 °F (36 8 °C), temperature source Temporal, resp  rate 20, height 5' 6" (1 676 m), weight 95 4 kg (210 lb 5 1 oz), last menstrual period 2022, SpO2 96 %, not currently breastfeeding  ,Body mass index is 33 95 kg/m²  Temp (24hrs), Av 5 °F (36 9 °C), Min:97 7 °F (36 5 °C), Max:99 4 °F (37 4 °C)  Current: Temperature: 98 3 °F (36 8 °C)      Intake/Output Summary (Last 24 hours) at 2022 1908  Last data filed at 2022 1501  Gross per 24 hour   Intake 2700 ml   Output --   Net 2700 ml       Invasive Devices  Report    Peripheral Intravenous Line  Duration           Peripheral IV 22 Left Forearm <1 day    Peripheral IV 22 Right Hand <1 day                Physical Exam:  General: No acute distress, alert and oriented  CV: Well perfused, regular rate and rhythm  Lungs: Normal work of breathing, no increased respiratory effort  Abdomen: Soft, appropriately tender, non-distended  Incisions clean, dry and intact    Extremities: No edema, clubbing or cyanosis  Skin: Warm, dry    Lab Results:   BMP/CMP: No results found for: SODIUM, K, CL, CO2, ANIONGAP, BUN, CREATININE, GLUCOSE, CALCIUM, AST, ALT, ALKPHOS, PROT, BILITOT, EGFR and CBC:   Lab Results   Component Value Date     2022    MPV 11 0 2022 VTE Prophylaxis: Sequential compression device (Venodyne)  and Heparin    Melissa Lynn MD  6/16/2022

## 2022-06-16 NOTE — INTERVAL H&P NOTE
H&P reviewed  After examining the patient I find no changes in the patients condition since the H&P had been written      Vitals:    06/16/22 0829   BP: 133/64   Pulse: 86   Resp: 16   Temp: 99 4 °F (37 4 °C)   SpO2: 96%

## 2022-06-16 NOTE — ANESTHESIA PREPROCEDURE EVALUATION
Procedure:  REPAIR HERNIA PARAESOPHAGEAL LAPAROSCOPIC W ROBOTICS WITH TOUPET FUNDOPLICATION (N/A Abdomen)  REPAIR HERNIA INCISIONAL LAPAROSCOPIC (N/A Abdomen)    Relevant Problems   CARDIO   (+) Essential hypertension   (+) Migraine without aura      GI/HEPATIC   (+) GERD (gastroesophageal reflux disease)   (+) Hiatal hernia      NEURO/PSYCH   (+) Depression   (+) Migraine without aura      PULMONARY   (+) ANGEL (obstructive sleep apnea)      Other   (+) Ventral hernia without obstruction or gangrene        Physical Exam    Airway    Mallampati score: IV  TM Distance: >3 FB  Neck ROM: full     Dental   No notable dental hx     Cardiovascular  Rhythm: regular, Rate: normal, Cardiovascular exam normal    Pulmonary  Pulmonary exam normal Breath sounds clear to auscultation,     Other Findings  Small chin      Anesthesia Plan  ASA Score- 2     Anesthesia Type- general with ASA Monitors  Additional Monitors:   Airway Plan: ETT  Plan Factors-Exercise tolerance (METS): >4 METS  Chart reviewed  Existing labs reviewed  Patient summary reviewed  Patient is not a current smoker  Patient did not smoke on day of surgery  Induction- intravenous  Postoperative Plan- Plan for postoperative opioid use  Planned trial extubation    Informed Consent- Anesthetic plan and risks discussed with patient

## 2022-06-16 NOTE — OP NOTE
OPERATIVE REPORT  PATIENT NAME: Leverette Cooks    :  1980  MRN: [id number]  Pt Location: [facility] OR ROOM 08    SURGERY DATE: 2022    Surgeon(s) and Role:     * Brittany Marin MD - Primary     * Georgia Griffin PA-C - Assisting     * Deloris Garcia MD - Assisting    Preop Diagnosis:  Paraesophageal hiatal hernia [K44 9]  Incisional hernia, without obstruction or gangrene [K43 2]    Post-Op Diagnosis Codes:     * Paraesophageal hiatal hernia [K44 9]     * Incisional hernia, without obstruction or gangrene [K43 2]    Procedure(s) (LRB):  REPAIR HIATAL HERNIA PARAESOPHAGEAL LAPAROSCOPIC W ROBOTICS WITH TOUPET FUNDOPLICATION, CRUROPLASTY WITH MESH, (N/A)  REPAIR HERNIA VENTRAL LAPAROSCOPIC (N/A)    Specimen(s):  ID Type Source Tests Collected by Time Destination   1 : Ventral Hernia Sac  Tissue Abdominal TISSUE EXAM Brittany Marin MD 2022 1159    2 : HIATAL HERNIA SAC Tissue Soft Tissue, Other TISSUE EXAM Brittany Marin MD 2022 1350        Estimated Blood Loss:   Minimal    Drains:  * No LDAs found *    Anesthesia Type:   General    Operative Indications:  Paraesophageal hiatal hernia [K44 9]  Ventral hernia, without obstruction or gangrene [K43 2]      Operative Findings:  Ventral hernia containing large amount of preperitoneal fat  Fascial defect around 1 5 cm  Large paraesophageal hernia with the top 3rd of the stomach in the chest       Complications:   None    Procedure and Technique  The patient was seen again in the Holding Room  The risks, benefits, complications, treatment options, and expected outcomes were discussed with the patient  The patient and/or family concurred with the proposed plan, giving informed consent  The site of surgery properly noted/marked  The patient was taken to Operating Room, identified as Leverette Cooks  and the procedure verified  A Time Out was held after prepping and draping in sterile fashion  The above information was confirmed      Prior to the induction of general anesthesia, antibiotic prophylaxis was administered  General endotracheal anesthesia was then administered and tolerated well  After the induction, the abdomen was prepped in the usual sterile fashion  An incision was made in the midline above the umbilicus at the location of the ventral hernia  Skin was dissected Bovie cautery down to the fascia  The hernia sacs identified and freed up circumferentially  It contained a large amount of preperitoneal fat  The base sac was identified clamped and tied off with 0 Vicryl suture  The sac was sent to pathology  The fascial defect measured about 1 5 cm and so an 8 trocars placed in an 0 Vicryl stay sutures placed to hold the fascia closed around the trocar  The gas was then insufflated  A 5 mm incision was made subxiphoid area and a liver retractor was placed in and the left lobe of liver was elevated revealing the  Diaphragm and the retractor was secured to the bed  The next two trocars were placed on either side in the midclavicular line and a final 5 mm trocars placed laterally on the left  The robot was now docked  Attention was turned to the stomach where the pars flaccida was opened with scissors and vessel sealer to the right eloisa  The peritoneum at the edge of the eloisa was opened with scissors and vessel sealer entering into the avascular plane around the hernia sac  The attachments w dissected along the eloisa towards the anterior portion  Using blunt dissection the avascular plane was dissected around the hernia sac dissecting it out of the mediastinum  Then the left eloisa was freed up from hernial attachments reducing the hernia sac  Next the avascular plane was dissected under the esophagus across the left side  A Penrose drain was passed through the space and wrapped around the esophagus and secured with a Endoloop     Dissection was continued until the gastroesophageal junction was at least 2 cm inside the abdominal cavity  The vagus nerve was preserved  The short gastrics were dissected with Harmonic scalpel from midway down the greater curvature all the way up to the left eloisa  Care was taken to ensure hemostasis  Also to ensure careful dissection along the gastro -splenic ligament  Now the dissection was complete  The GE junction was identified  At least 2 cm in the abdominal cavity  The Cruroplasty was performed next  The posterior eloisa were reapproximated with interrupted simple 0 Ethibond  Sutures  A bougie was in place to prevent closing this defect too tightly  Then a [employer] mesh was brought on and deployed posteriorly and secured with 0 Vicryl suture  The fundus was grasped and pulled posterior to esophagus  A 270 degree partial fundoplication was performed the wrap was formed with 3 sutures on either side the stomach to the esophagus  A posterior stitch was placed from the wrap to the eloisa to prevent slipping  The bougie was carefully removed     The robot was now undocked  The liver retractor was removed  The left upper quadrant trocar site was closed with an 0 Vicryl suture using a [employer] suture Passer  The gas was deflated  The ventral hernia was then closed with 2 figure-of-eight 2-0 Prolene sutures  The skin was closed with interrupted 4-0 Monocryl suture  Histocryl was applied  Please note that the PA was essential for assistance throughout the entire procedure including opening closing retraction and visualization       I was present for the entire procedure    Patient Disposition:  PACU       SIGNATURE: Juventino Clinton MD  DATE: June 16, 2022  TIME: 1:52 PM

## 2022-06-16 NOTE — ANESTHESIA POSTPROCEDURE EVALUATION
Post-Op Assessment Note    CV Status:  Stable  Pain Score: 0    Pain management: adequate     Mental Status:  Arousable   Hydration Status:  Stable   PONV Controlled:  Controlled   Airway Patency:  Patent      Post Op Vitals Reviewed: Yes      Staff: CRNA         No complications documented      BP   98/68   Temp 97 8   Pulse 76   Resp 18   SpO2 100

## 2022-06-17 ENCOUNTER — APPOINTMENT (OUTPATIENT)
Dept: RADIOLOGY | Facility: HOSPITAL | Age: 42
End: 2022-06-17
Payer: COMMERCIAL

## 2022-06-17 VITALS
WEIGHT: 210.32 LBS | BODY MASS INDEX: 33.8 KG/M2 | OXYGEN SATURATION: 94 % | RESPIRATION RATE: 18 BRPM | HEIGHT: 66 IN | DIASTOLIC BLOOD PRESSURE: 65 MMHG | HEART RATE: 62 BPM | SYSTOLIC BLOOD PRESSURE: 123 MMHG | TEMPERATURE: 97.9 F

## 2022-06-17 LAB
ANION GAP SERPL CALCULATED.3IONS-SCNC: 7 MMOL/L (ref 4–13)
BASOPHILS # BLD AUTO: 0.01 THOUSANDS/ΜL (ref 0–0.1)
BASOPHILS NFR BLD AUTO: 0 % (ref 0–1)
BUN SERPL-MCNC: 9 MG/DL (ref 5–25)
CALCIUM SERPL-MCNC: 8.4 MG/DL (ref 8.3–10.1)
CHLORIDE SERPL-SCNC: 105 MMOL/L (ref 100–108)
CO2 SERPL-SCNC: 26 MMOL/L (ref 21–32)
CREAT SERPL-MCNC: 0.73 MG/DL (ref 0.6–1.3)
EOSINOPHIL # BLD AUTO: 0 THOUSAND/ΜL (ref 0–0.61)
EOSINOPHIL NFR BLD AUTO: 0 % (ref 0–6)
ERYTHROCYTE [DISTWIDTH] IN BLOOD BY AUTOMATED COUNT: 12.5 % (ref 11.6–15.1)
GFR SERPL CREATININE-BSD FRML MDRD: 101 ML/MIN/1.73SQ M
GLUCOSE P FAST SERPL-MCNC: 108 MG/DL (ref 65–99)
GLUCOSE SERPL-MCNC: 108 MG/DL (ref 65–140)
HCT VFR BLD AUTO: 34.2 % (ref 34.8–46.1)
HGB BLD-MCNC: 11.1 G/DL (ref 11.5–15.4)
IMM GRANULOCYTES # BLD AUTO: 0.04 THOUSAND/UL (ref 0–0.2)
IMM GRANULOCYTES NFR BLD AUTO: 0 % (ref 0–2)
LYMPHOCYTES # BLD AUTO: 0.9 THOUSANDS/ΜL (ref 0.6–4.47)
LYMPHOCYTES NFR BLD AUTO: 10 % (ref 14–44)
MCH RBC QN AUTO: 29.1 PG (ref 26.8–34.3)
MCHC RBC AUTO-ENTMCNC: 32.5 G/DL (ref 31.4–37.4)
MCV RBC AUTO: 90 FL (ref 82–98)
MONOCYTES # BLD AUTO: 0.49 THOUSAND/ΜL (ref 0.17–1.22)
MONOCYTES NFR BLD AUTO: 5 % (ref 4–12)
NEUTROPHILS # BLD AUTO: 8.07 THOUSANDS/ΜL (ref 1.85–7.62)
NEUTS SEG NFR BLD AUTO: 85 % (ref 43–75)
NRBC BLD AUTO-RTO: 0 /100 WBCS
PLATELET # BLD AUTO: 230 THOUSANDS/UL (ref 149–390)
PMV BLD AUTO: 11.1 FL (ref 8.9–12.7)
POTASSIUM SERPL-SCNC: 4.2 MMOL/L (ref 3.5–5.3)
RBC # BLD AUTO: 3.81 MILLION/UL (ref 3.81–5.12)
SODIUM SERPL-SCNC: 138 MMOL/L (ref 136–145)
WBC # BLD AUTO: 9.51 THOUSAND/UL (ref 4.31–10.16)

## 2022-06-17 PROCEDURE — 80048 BASIC METABOLIC PNL TOTAL CA: CPT | Performed by: SURGERY

## 2022-06-17 PROCEDURE — 99024 POSTOP FOLLOW-UP VISIT: CPT | Performed by: SURGERY

## 2022-06-17 PROCEDURE — 74240 X-RAY XM UPR GI TRC 1CNTRST: CPT

## 2022-06-17 PROCEDURE — C9113 INJ PANTOPRAZOLE SODIUM, VIA: HCPCS | Performed by: SURGERY

## 2022-06-17 PROCEDURE — 85025 COMPLETE CBC W/AUTO DIFF WBC: CPT | Performed by: SURGERY

## 2022-06-17 RX ORDER — OXYCODONE HYDROCHLORIDE 5 MG/1
5 TABLET ORAL EVERY 4 HOURS PRN
Qty: 15 TABLET | Refills: 0 | Status: SHIPPED | OUTPATIENT
Start: 2022-06-17 | End: 2022-06-27

## 2022-06-17 RX ORDER — ACETAMINOPHEN 325 MG/1
650 TABLET ORAL EVERY 6 HOURS PRN
Refills: 0
Start: 2022-06-17

## 2022-06-17 RX ADMIN — HEPARIN SODIUM 5000 UNITS: 5000 INJECTION INTRAVENOUS; SUBCUTANEOUS at 05:38

## 2022-06-17 RX ADMIN — OXYCODONE HYDROCHLORIDE 10 MG: 5 SOLUTION ORAL at 09:12

## 2022-06-17 RX ADMIN — SERTRALINE HYDROCHLORIDE 150 MG: 100 TABLET ORAL at 09:02

## 2022-06-17 RX ADMIN — SODIUM CHLORIDE, SODIUM LACTATE, POTASSIUM CHLORIDE, AND CALCIUM CHLORIDE 125 ML/HR: .6; .31; .03; .02 INJECTION, SOLUTION INTRAVENOUS at 05:46

## 2022-06-17 RX ADMIN — ONDANSETRON 4 MG: 2 INJECTION INTRAMUSCULAR; INTRAVENOUS at 12:36

## 2022-06-17 RX ADMIN — ONDANSETRON 4 MG: 2 INJECTION INTRAMUSCULAR; INTRAVENOUS at 05:38

## 2022-06-17 RX ADMIN — DILTIAZEM HYDROCHLORIDE 240 MG: 240 CAPSULE, COATED, EXTENDED RELEASE ORAL at 09:02

## 2022-06-17 RX ADMIN — PANTOPRAZOLE SODIUM 40 MG: 40 INJECTION, POWDER, FOR SOLUTION INTRAVENOUS at 09:02

## 2022-06-17 RX ADMIN — DIATRIZOATE MEGLUMINE AND DIATRIZOATE SODIUM 40 ML: 660; 100 LIQUID ORAL; RECTAL at 10:50

## 2022-06-17 RX ADMIN — OXYCODONE HYDROCHLORIDE 10 MG: 5 SOLUTION ORAL at 13:59

## 2022-06-17 RX ADMIN — OXYCODONE HYDROCHLORIDE 10 MG: 5 SOLUTION ORAL at 05:37

## 2022-06-17 NOTE — PROGRESS NOTES
General Surgery  Progress Note  Bianca Olsen 43 y o  female MRN: [id number]  Unit/Bed#: E2 -01 Encounter: [id number]    Assessment:  43year old female POD 1 s/p laparoscopic with robotic hiatal hernia repair, ventral hernia repair 2/2 paraesophageal hiatal hernia  Vital signs stable, afebrile  Patient tolerated clear liquid diet  Reports some nausea, but denies vomiting  Abdomen soft, appropriately tender, non distended    AM Labs pending     Plan:   Diet as tolerated   UGI today, will advance to full liquid diet if normal   DVT ppx: SQH   Pain/ nausea control PRN   OOB/ ambulation   Incentive Spirometry    Subjective/Objective     Subjective: Patient seen and examined at bedside, in no acute distress  Pain is well controlled  Has some nausea without vomiting  Tolerating diet otherwise  Objective:     Vitals:Blood pressure (!) 112/49, pulse 66, temperature (!) 97 °F (36 1 °C), resp  rate 18, height 5' 6" (1 676 m), weight 95 4 kg (210 lb 5 1 oz), last menstrual period 2022, SpO2 95 %, not currently breastfeeding  ,Body mass index is 33 95 kg/m²  Temp (24hrs), Av 8 °F (36 6 °C), Min:96 6 °F (35 9 °C), Max:99 4 °F (37 4 °C)  Current: Temperature: (!) 97 °F (36 1 °C)      Intake/Output Summary (Last 24 hours) at 2022 0889  Last data filed at 2022 2245  Gross per 24 hour   Intake 3018 75 ml   Output 950 ml   Net 2068 75 ml       Invasive Devices  Report    Peripheral Intravenous Line  Duration           Peripheral IV 22 Left Forearm <1 day    Peripheral IV 22 Right Hand <1 day                Physical Exam:  General: No acute distress, alert and oriented  CV: Well perfused, regular rate and rhythm  Lungs: Normal work of breathing, no increased respiratory effort  Abdomen: Soft, appropriately tender, non-distended  Incisions clean, dry and intact    Extremities: No edema, clubbing or cyanosis  Skin: Warm, dry    Lab Results:   BMP/CMP: No results found for: SODIUM, K, CL, CO2, ANIONGAP, BUN, CREATININE, GLUCOSE, CALCIUM, AST, ALT, ALKPHOS, PROT, BILITOT, EGFR and CBC:   Lab Results   Component Value Date    WBC 9 51 06/17/2022    HGB 11 1 (L) 06/17/2022    HCT 34 2 (L) 06/17/2022    MCV 90 06/17/2022     06/17/2022    MCH 29 1 06/17/2022    MCHC 32 5 06/17/2022    RDW 12 5 06/17/2022    MPV 11 1 06/17/2022    NRBC 0 06/17/2022     VTE Prophylaxis: Sequential compression device (Venodyne)  and Heparin    Reyes Vegas MD  6/17/2022

## 2022-06-17 NOTE — DISCHARGE INSTRUCTIONS
Brittaney Llamas MD, [facility]     [phone]          1  General: You will feel pulling sensations around the wound or funny aches and pains around the incisions  This is normal  Even minor surgery is a change in your body and this is your bodys way of reacting to it  If you have had abdominal surgery, it may help to support the incision with a small pillow or blanket for comfort when moving or coughing  2  Wound care:  Okay to shower  The glue will fall off over the next week or 2  Use ice for at least the 1st 48 hours  Do not use for longer than 20 minutes at a time  Use 3 times per day  3  Water: You may shower over the wounds  Do not bathe or use a pool or hot tub until cleared by the physician  If you were discharged with a drain, make sure drain site is covered with plastic wrap before showering  4  Activity: You may go up and down stairs, walk as much as you are comfortable, but walk at least 3 times each day  If you have had abdominal surgery, do not lift anything heavier than 20 pounds for at least 4 weeks  5  Diet: Follow Fundoplication diet as instructed      6  Medications: Resume all of your previous medications, unless told otherwise by the doctor  Avoid aspirin products for 2-3 days after the date of surgery  You may, at that time, began to take them again  Use Tylenol and Ibuprofen for pain control  You may alternate these medications every 3 hours  For example: you may take Tylenol at noon, Ibuprofen at 3:00 p m , and Tylenol again at 6:00 p m , etc  You should use ice to assist with pain control as above  You do not need to take narcotic pain medication unless you are having significant pain  If you were prescribed a narcotic pain medication containing Tylenol, such as Percocet or Norco, do not use supplemental Tylenol  7  Driving:  You will need someone to drive you home on the day of surgery or discharge  Do not drive or make any important decisions while on narcotic pain medication or 24 hours and after anesthesia or sedation for surgery  Generally, you may drive when your off all narcotic pain medications and you are comfortable  8  Upset Stomach: You may take Maalox, Tums, or similar items for an upset stomach  If your narcotic pain medication causes an upset stomach, do not take it on an empty stomach  Try taking it with at least some crackers or toast       9  Constipation: Patients often experience constipation after surgery  You may take over-the-counter medication for this, such as Metamucil, Senokot, Dulcolax, milk of magnesia, etc  You may take a suppository unless you have had anorectal surgery such as a procedure on your hemorrhoids  If you experience significant nausea or vomiting after abdominal surgery, call the office before trying any of these medications  10  Call the office: If you are experiencing any of the following: fevers above 101 5°, significant nausea or vomiting, if the wound develops drainage and/or there is excessive redness around the wound, or if you have significant diarrhea or other worsening symptoms  11  Pain: You may be given a prescription for pain medication  This will be sent to your pharmacy prior to discharge  12  Sexual Activity: You may resume sexual activity when you feel ready and comfortable and your incision is sealed and healed without apparent infection risk  13  Urination: If you have not urinated in 6 hours, go directly to the ER for evaluation for urinary retention  14  Follow-up in 2 weeks  **READ ONLY IF YOU HAVE BEEN DISCHARGED WITH A URINARY CATHETER**    Mora Insertion for Post-Op Urinary Retention        - A prescription for Flomax will be sent to your pharmacy  This should be taken daily while the urinary catheter remains in place      You will not be given a prescription for Flomax if your prostate has been removed  If you are already taking Flomax, continue the medication as prescribed  - We will send a message to the urology group who will contact you within the next 48 hours with further instructions and to schedule an appointment for voiding trial and catheter removal   The urinary catheter will remain in place for approximately 1 week  If you are not contacted within the next 48 hours please call our office to assist with scheduling your follow-up       - If you have your own urologist, you should contact your physician the day after discharge for instructions and to schedule a voiding trial and catheter removal

## 2022-06-29 ENCOUNTER — OFFICE VISIT (OUTPATIENT)
Dept: SURGERY | Facility: CLINIC | Age: 42
End: 2022-06-29

## 2022-06-29 VITALS — TEMPERATURE: 97.7 F | RESPIRATION RATE: 16 BRPM | HEIGHT: 66 IN | BODY MASS INDEX: 34.2 KG/M2 | WEIGHT: 212.8 LBS

## 2022-06-29 DIAGNOSIS — Z98.890 STATUS POST LAPAROSCOPIC HERNIA REPAIR: Primary | ICD-10-CM

## 2022-06-29 DIAGNOSIS — Z87.19 STATUS POST LAPAROSCOPIC HERNIA REPAIR: Primary | ICD-10-CM

## 2022-06-29 PROCEDURE — 99024 POSTOP FOLLOW-UP VISIT: CPT | Performed by: SURGERY

## 2022-06-29 NOTE — ASSESSMENT & PLAN NOTE
It is now 2 weeks since her surgery  Overall she is doing well  I reassured her that it has only been 2 weeks and it can take some time to return to normal diet  Ryan Hifilippo just to listen to her body and slowly advance different textures and over time things will improve and she came back to return turned to normal diet in the future  As far as the bloating is concerned this is very common as it is difficult to belch after having the surgery and can easily buildup with tear causing significant discomfort and bloating  Follow-up in 2 months for re-evaluation  If she is back to normal in feeling great she can cancel the appointment otherwise I will see her back in 2 monthsnd she come off of her proton pump inhibitors

## 2022-06-29 NOTE — PROGRESS NOTES
Assessment/Plan:    Status post laparoscopic hernia repair   It is now 2 weeks since her surgery  Overall she is doing well  I reassured her that it has only been 2 weeks and it can take some time to return to normal diet  Timothy Ken just to listen to her body and slowly advance different textures and over time things will improve and she came back to return turned to normal diet in the future  As far as the bloating is concerned this is very common as it is difficult to belch after having the surgery and can easily buildup with tear causing significant discomfort and bloating  Follow-up in 2 months for re-evaluation  If she is back to normal in feeling great she can cancel the appointment otherwise I will see her back in 2 monthsnd she come off of her proton pump inhibitors  Diagnoses and all orders for this visit:    Status post laparoscopic hernia repair          Subjective:      Patient ID: Ochoa Torres is a 43 y o  female  06/29/2022 she is now 2 weeks status post robotic repair of a paraesophageal hernia with a partial fundoplication as well as incisional ventral hernia repair  She is doing well overall  She is still having some difficulty advancing back to a regular diet  She had 2 episodes of some retching after eating past the sensation some dysphagia  She also has some bloating  Her muscle pains are well controlled  She denies fevers or chills            Review of Systems      Objective:      Temp 97 7 °F (36 5 °C)   Resp 16   Ht 5' 6" (1 676 m)   Wt 96 5 kg (212 lb 12 8 oz)   LMP 06/16/2022 (Exact Date) Comment: Pt reports has IUD in place at this time  BMI 34 35 kg/m²          Physical Exam  Abdominal:      Comments:   Incisions well healed

## 2022-09-08 ENCOUNTER — TELEPHONE (OUTPATIENT)
Dept: NEUROLOGY | Facility: CLINIC | Age: 42
End: 2022-09-08

## 2022-09-08 NOTE — TELEPHONE ENCOUNTER
LMOM for the patient to call us back to reschedule her appt with Dr Marcos Armijo on 11/10 to a Headache provider  Offered a sooner appt with Dr Ruby Carvajal next week in the CV office if interested

## 2022-12-20 ENCOUNTER — TELEPHONE (OUTPATIENT)
Dept: NEUROLOGY | Facility: CLINIC | Age: 42
End: 2022-12-20

## 2022-12-27 ENCOUNTER — OFFICE VISIT (OUTPATIENT)
Dept: NEUROLOGY | Facility: CLINIC | Age: 42
End: 2022-12-27

## 2022-12-27 VITALS
WEIGHT: 189.3 LBS | TEMPERATURE: 98.6 F | SYSTOLIC BLOOD PRESSURE: 118 MMHG | HEART RATE: 72 BPM | BODY MASS INDEX: 30.55 KG/M2 | DIASTOLIC BLOOD PRESSURE: 82 MMHG

## 2022-12-27 DIAGNOSIS — I10 ESSENTIAL HYPERTENSION: ICD-10-CM

## 2022-12-27 DIAGNOSIS — E34.8 PINEAL GLAND CYST: ICD-10-CM

## 2022-12-27 DIAGNOSIS — F32.A ANXIETY AND DEPRESSION: ICD-10-CM

## 2022-12-27 DIAGNOSIS — G43.009 MIGRAINE WITHOUT AURA AND WITHOUT STATUS MIGRAINOSUS, NOT INTRACTABLE: Primary | ICD-10-CM

## 2022-12-27 DIAGNOSIS — G47.33 OBSTRUCTIVE SLEEP APNEA (ADULT) (PEDIATRIC): ICD-10-CM

## 2022-12-27 DIAGNOSIS — F41.9 ANXIETY AND DEPRESSION: ICD-10-CM

## 2022-12-27 RX ORDER — SODIUM FLUORIDE1.1%, POTASSIUM NITRATE 5% 5.8; 57.5 MG/ML; MG/ML
GEL, DENTIFRICE DENTAL
COMMUNITY
Start: 2022-12-23

## 2022-12-27 RX ORDER — IBUPROFEN 800 MG/1
TABLET ORAL EVERY 6 HOURS PRN
COMMUNITY

## 2022-12-27 NOTE — PATIENT INSTRUCTIONS
- Please follow-up with your sleep physician for a new mask and treatment of your sleep apnea    Additional Testing:   Neurodiagnostic workup:  MRI Brain ordered    Headache Calendar  Please maintain a headache calendar  Consider using phone applications such as Migraine Mayo or [employer] Migraine Tracker    Headache/migraine treatment:   Acute medications (for immediate treatment of a headache): It is ok to take ibuprofen, acetaminophen or naproxen (Advil, Tylenol,  Aleve, Excedrin) if they help your headaches you should limit these to No more than 2-3 times a week to avoid medication overuse/rebound headaches  For your more moderate to severe migraines take this medication early  - Ubrelvy 100mg    Prescription preventive medications for headaches/migraines   (to take every day to help prevent headaches - not to take at the time of headache):  [x] Emgality/Galcanezumab - the 1st dose is 240 mg loading dose of 2 consecutive 120 mg injections  Thereafter, 120 mg injections every 30 days    If needed there is a coupon card for the copay at [employer]  com     READ INSTRUCTIONS that come with the medication  REFRIGERATE  Keep out of direct sunlight  Prior to administration, allow to come to room temperature for 30 minutes  Do not warm using a heat source (eg, microwave or hot water)  Do not shake  Administer in preferably abdomen (avoiding 2 inches around the navel), thigh, upper arm, or buttocks avoiding areas of skin that are tender, bruised, red or hard  Deliver entire contents of single-use prefilled pen or syringe  Unknown impact in pregnancy therefore would recommend stopping 6 months prior to considering pregnancy  *Typically these types of medications take time until you see the benefit, although some may see improvement in days, often it may take weeks, especially if the medication is being titrated up to a beneficial level   Please contact us if there are any concerns or questions regarding the medication  Lifestyle Recommendations:  [x] SLEEP - Maintain a regular sleep schedule: Adults need at least 7-8 hours of uninterrupted a night  Maintain good sleep hygiene:  Going to bed and waking up at consistent times, avoiding excessive daytime naps, avoiding caffeinated beverages in the evening, avoid excessive stimulation in the evening and generally using bed primarily for sleeping  One hour before bedtime would recommend turning lights down lower, decreasing your activity (may read quietly, listen to music at a low volume)  When you get into bed, should eliminate all technology (no texting, emailing, playing with your phone, iPad or tablet in bed)  [x] HYDRATION - Maintain good hydration  Drink  2L of fluid a day (4 typical small water bottles)  [x] DIET - Maintain good nutrition  In particular don't skip meals and try and eat healthy balanced meals regularly  [x] TRIGGERS - Look for other triggers and avoid them: Limit caffeine to 1-2 cups a day or less  Avoid dietary triggers that you have noticed bring on your headaches (this could include aged cheese, peanuts, MSG, aspartame and nitrates)  [x] EXERCISE - physical exercise as we all know is good for you in many ways, and not only is good for your heart, but also is beneficial for your mental health, cognitive health and  chronic pain/headaches  I would encourage at the least 5 days of physical exercise weekly for at least 30 minutes  Education and Follow-up  [x] Please call with any questions or concerns  Of course if any new concerning symptoms go to the emergency department    [x] Follow up in 4 months

## 2022-12-27 NOTE — PROGRESS NOTES
Review of Systems   Constitutional: Negative  Negative for appetite change and fever  HENT: Positive for dental problem  Negative for hearing loss, tinnitus, trouble swallowing and voice change  Eyes: Positive for photophobia and pain  Negative for visual disturbance  Respiratory: Negative  Negative for shortness of breath  Cardiovascular: Negative  Negative for palpitations  Gastrointestinal: Positive for nausea (sometimes)  Negative for vomiting  Endocrine: Negative  Negative for cold intolerance  Genitourinary: Negative  Negative for dysuria, frequency and urgency  Musculoskeletal: Positive for back pain (shoulders), neck pain and neck stiffness  Negative for gait problem and myalgias  Skin: Negative  Negative for rash  Allergic/Immunologic: Negative  Neurological: Positive for dizziness (sometimes), light-headedness (sometimes) and headaches  Negative for tremors, seizures, syncope, facial asymmetry, speech difficulty, weakness and numbness  Hematological: Negative  Does not bruise/bleed easily  Psychiatric/Behavioral: Positive for sleep disturbance (wake up with headaches)  Negative for confusion and hallucinations  All other systems reviewed and are negative

## 2022-12-27 NOTE — PROGRESS NOTES
[facility] Consult  PATIENT:  Leonor Garcia  MRN:  [id number]  :  1980  DATE OF SERVICE:  2022  REFERRED BY: Self, Referral  PMD: Melissa Alford DO    Assessment/Plan:     Leonor Garcia is a delightful  43 y o  female with a past medical history that includes GERD, ANGEL, hypertension, obesity, depression, anxiety, SVT referred here for evaluation of headache  Initial evaluation 2022     Ms Esvin Shepard presents with a longstanding history of migraine headaches  She was previously evaluated by neurology in 2017, but has not been seen by anyone since then  She has tried multiple preventive medications without significant benefit, and continues to note nearly daily headaches  We discussed a variety of potential treatment options, but she was interested in trying Emgality monthly injections  From an abortive standpoint, she reports that rizatriptan works well for her, but I have concerns especially with her history of SVT  I have recommended that she stop taking that and I will switch her to Severy instead  She does have a history of a pineal cyst that was last evaluated in 2016  I would like to obtain repeat imaging with an MRI of the brain with contrast   Finally, I suspect that untreated obstructive sleep apnea is significantly contributing to her ongoing headaches and I have highly encouraged her to follow-up with her sleep physician for a new mask and treatment of her sleep apnea  Migraine without aura and without status migrainosus, not intractable  -     Ubrogepant (UBRELVY) 100 MG tablet; Take 1 tablet (100 mg) one time as needed for migraine  May repeat one additional tablet (100 mg) at least two hours after the first dose   Do not use more than two doses per day  -     Galcanezumab-gnlm 120 MG/ML SOAJ; Inject 240 mg under the skin once for 1 dose For just the first month loading dose, followed by 1 injection monthly on separate prescription   -     Galcanezumab-gnlm 120 MG/ML SOAJ; Inject 120 mg under the skin every 30 (thirty) days Following the first month loading dose of 2 pens    -     MRI brain with and without contrast; Future    Obstructive sleep apnea (adult) (pediatric)    Anxiety and depression    Pineal gland cyst  -     MRI brain with and without contrast; Future        Workup:  - Neurologic assessment reveals unremarkable neurological exam   - MRI brain with contrast 4/22/2016: 14 x 10 x 12 mm cystic area in the pineal region, compatible with pineal cyst  2-3 nonspecific white matter abnormalities noted in the frontal subcortical area bilaterally  - Pending repeat MRI brain with contrast    Preventative:  - we discussed headache hygiene and lifestyle factors that may improve headaches  - Emgality  - Currently on through other providers: Diltiazem (SVT), Losartan (HTN), Zoloft (mood)  - Past/ failed/contraindicated: Nortriptyline (helped), Effexor (did not help), Topamax (side effects), Gabapentin, Inderal  - future options: CGRP med, botox    Acute:  - discussed not taking over-the-counter or prescription pain medications more than 3 days per week to prevent medication overuse/rebound headache  - Ubrelvy 100mg  - Currently on through other providers: None  - Past/ failed/contraindicated: Triptans (avoid due to history of SVT)  - future options:  prochlorperazine, Toradol IM or p o , could consider trial of 5 days of Depakote 500 mg nightly or dexamethasone 2 mg daily for prolonged migraine (evaluate prior response to steroids in the setting of cardiac arrhythmia), reyvow, nurtec  Patient instructions   - Please follow-up with your sleep physician for a new mask and treatment of your sleep apnea    Additional Testing:   Neurodiagnostic workup:  MRI Brain ordered    Headache Calendar  Please maintain a headache calendar  Consider using phone applications such as Migraine Mayo or [employer] Migraine Tracker    Headache/migraine treatment:   Acute medications (for immediate treatment of a headache): It is ok to take ibuprofen, acetaminophen or naproxen (Advil, Tylenol,  Aleve, Excedrin) if they help your headaches you should limit these to No more than 2-3 times a week to avoid medication overuse/rebound headaches  For your more moderate to severe migraines take this medication early  - Ubrelvy 100mg    Prescription preventive medications for headaches/migraines   (to take every day to help prevent headaches - not to take at the time of headache):  [x] Emgality/Galcanezumab - the 1st dose is 240 mg loading dose of 2 consecutive 120 mg injections  Thereafter, 120 mg injections every 30 days    If needed there is a coupon card for the copay at [employer]     READ INSTRUCTIONS that come with the medication  REFRIGERATE  Keep out of direct sunlight  Prior to administration, allow to come to room temperature for 30 minutes  Do not warm using a heat source (eg, microwave or hot water)  Do not shake  Administer in preferably abdomen (avoiding 2 inches around the navel), thigh, upper arm, or buttocks avoiding areas of skin that are tender, bruised, red or hard  Deliver entire contents of single-use prefilled pen or syringe  Unknown impact in pregnancy therefore would recommend stopping 6 months prior to considering pregnancy  *Typically these types of medications take time until you see the benefit, although some may see improvement in days, often it may take weeks, especially if the medication is being titrated up to a beneficial level  Please contact us if there are any concerns or questions regarding the medication  Lifestyle Recommendations:  [x] SLEEP - Maintain a regular sleep schedule: Adults need at least 7-8 hours of uninterrupted a night   Maintain good sleep hygiene:  Going to bed and waking up at consistent times, avoiding excessive daytime naps, avoiding caffeinated beverages in the evening, avoid excessive stimulation in the evening and generally using bed primarily for sleeping  One hour before bedtime would recommend turning lights down lower, decreasing your activity (may read quietly, listen to music at a low volume)  When you get into bed, should eliminate all technology (no texting, emailing, playing with your phone, iPad or tablet in bed)  [x] HYDRATION - Maintain good hydration  Drink  2L of fluid a day (4 typical small water bottles)  [x] DIET - Maintain good nutrition  In particular don't skip meals and try and eat healthy balanced meals regularly  [x] TRIGGERS - Look for other triggers and avoid them: Limit caffeine to 1-2 cups a day or less  Avoid dietary triggers that you have noticed bring on your headaches (this could include aged cheese, peanuts, MSG, aspartame and nitrates)  [x] EXERCISE - physical exercise as we all know is good for you in many ways, and not only is good for your heart, but also is beneficial for your mental health, cognitive health and  chronic pain/headaches  I would encourage at the least 5 days of physical exercise weekly for at least 30 minutes  Education and Follow-up  [x] Please call with any questions or concerns  Of course if any new concerning symptoms go to the emergency department  [x] Follow up in 4 months  CC: We had the pleasure of evaluating Nabila Shahid in neurological consultation today  Nabila Shahid is a  right handed female who presents today for evaluation of headaches  History obtained from patient as well as available medical record review  History of Present Illness:   Current medical illnesses  or past medical history include GERD, ANGEL, hypertension, obesity, depression, anxiety, SVT    Headaches started at what age? 25years old  How often do the headaches occur?  - as of 12/27/2022: 30/30 (Severe: 2-3)   What time of the day do the headaches start?   Either wake up with them or mid-day  How long do the headaches last? 4-6 hours if severe enough  Are you ever headache free? No    Aura? without aura     Where is your headache located and pain quality? Frontal; variety of pain types  What is the intensity of pain? Worst 10/10, Average: 7/10  Associated symptoms:   [x] Nausea    [x] Stiff or sore neck (chronic)  [x] Photophobia     [x]Phonophobia      [x] Osmophobia (perfumes, strong deodorant, candles)  [x] Blurred vision (couple times)  [x] Prefer quiet, dark room  [x] Light-headed or dizzy       Things that make the headache worse? Walking around    Headache triggers:  Not drinking coffee, stress, poor sleep    Have you seen someone else for headaches or pain? Yes, neurologist at [facility] in 2017  Have you had trigger point injection performed and how often? No  Have you had Botox injection performed and how often? No   Have you had epidural injections or transforaminal injections performed? Yes, pregnancy  Are you current pregnant or planning on getting pregnant? No  Have you ever had any Brain imaging? yes MRI 2016    Last eye exam: Summer 2022 - normal     What medications do you take or have you taken for your headaches?    ABORTIVE:    OTC medications: Ibuprofen (daily use)  Prescription: Maxalt (2-3 times per week)    Past/ failed/contraindicated:  OTC medications: Tylenol (does not help)  Prescription: Imitrex (tingling sensation)    PREVENTIVE:   Diltiazem (SVT), Losartan (HTN), Zoloft (mood)    Past/ failed/contraindicated:  Nortriptyline (helped), Effexor (did not help), Topamax (side effects), Gabapentin, Inderal    LIFESTYLE  Sleep   - averages: about 8 hours per night  Problems falling asleep?:   No  Problems staying asleep?:  Yes  - Not fully compliant with CPAP    Physical activity: nothing scheduled    Water: about 32oz per day  Caffeine: about 2 cups of coffee per day    Mood:   History of anxiety and depression  - Managed by psychiatry    The following portions of the patient's history were reviewed and updated as appropriate: allergies, current medications, past family history, past medical history, past social history, past surgical history and problem list     Pertinent family history:  Family history of headaches:  migraine headaches in mother and sister  Any family history of aneurysms - No    Pertinent social history:  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 HPV   • Anemia     during pregnancy   • Anxiety     currently medicated   • Depression     currently medicated   • GERD (gastroesophageal reflux disease)    • Gestational diabetes    • Headache, tension-type 1998   • Hypertension    • Incisional hernia    • Migraine     prior to pregnancy   • Paraesophageal hernia     6/13/22   • Psychiatric disorder    • Sleep apnea     6/13/22 Pt reports with use of CPAP for sleep apnea   • SVT (supraventricular tachycardia) ([facility] [address] )     6/13/22 Pt reports with hx of SVT controlled for approx one year with Cardizem   • Visual impairment     wears corrective lenses       Patient Active Problem List   Diagnosis   • Maternal obesity syndrome in third trimester   • Elderly primigravida in third trimester   • Insulin controlled gestational diabetes mellitus in third trimester   • Polyhydramnios in stark pregnancy in third trimester   • 37 weeks gestation of pregnancy   • Migraine without aura   • Herpes, genital   • GERD (gastroesophageal reflux disease)   • Family history of cardiovascular disease   • Essential hypertension   • Depression   • Obesity, Class II, BMI 35-39 9   • ANGEL (obstructive sleep apnea)   • Hiatal hernia   • Ventral hernia without obstruction or gangrene   • Incisional hernia, without obstruction or gangrene   • Status post laparoscopic hernia repair       Medications: Current Outpatient Medications   Medication Sig Dispense Refill   • calcium carbonate (TUMS EX) 750 mg chewable tablet Chew 3 tablets if needed     • diltiazem (CARDIZEM CD) 240 mg 24 hr capsule Take 240 mg by mouth daily Takes at night     • ibuprofen (MOTRIN) 800 mg tablet Take by mouth every 6 (six) hours as needed for mild pain     • Levonorgestrel (Liletta, 52 MG,) 19 5 MCG/DAY IUD IUD Liletta 20 1 mcg/24 hrs (6 yrs) 52 mg intrauterine device   Take 1 device by intrauterine route  NDC# 9305-9416-80     • losartan (COZAAR) 25 mg tablet Take 25 mg by mouth daily Takes in the am     • rizatriptan (MAXALT) 10 MG tablet Take 10 mg by mouth once as needed for migraine May repeat in 2 hours if needed     • sertraline (ZOLOFT) 100 mg tablet Take 200 mg by mouth daily Takes in the evening     • Sodium Fluoride 5000 Sensitive 1 1-5 % GEL      • acetaminophen (TYLENOL) 325 mg tablet Take 2 tablets (650 mg total) by mouth every 6 (six) hours as needed for mild pain (Patient not taking: Reported on 6/29/2022)  0   • omeprazole (PriLOSEC) 40 MG capsule Take 40 mg by mouth in the morning Takes at night (Patient not taking: Reported on 6/29/2022)       No current facility-administered medications for this visit          Allergies:    No Known Allergies    Family History:     Family History   Problem Relation Age of Onset   • Diabetes Mother    • Hypertension Mother    • Stroke Mother         36   • Cancer Father    • Esophageal cancer Father    • No Known Problems Sister    • No Known Problems Sister    • No Known Problems Brother    • No Known Problems Maternal Grandmother    • No Known Problems Maternal Grandfather    • No Known Problems Paternal Grandmother    • Heart disease Paternal Grandfather    • Thyroid disease Neg Hx    • Anesthesia problems Neg Hx        Social History:       Social History     Socioeconomic History   • Marital status: Significant Other     Spouse name: Not on file   • Number of children: Not on file   • Years of education: Not on file   • Highest education level: Not on file   Occupational History   • Not on file   Tobacco Use   • Smoking status: Never   • Smokeless tobacco: Never   Vaping Use   • Vaping Use: Never used   Substance and Sexual Activity   • Alcohol use: No   • Drug use: Never   • Sexual activity: Not Currently     Partners: Male     Birth control/protection: I U D  Other Topics Concern   • Not on file   Social History Narrative   • Not on file     Social Determinants of Health     Financial Resource Strain: Not on file   Food Insecurity: Not on file   Transportation Needs: Not on file   Physical Activity: Not on file   Stress: Not on file   Social Connections: Not on file   Intimate Partner Violence: Not on file   Housing Stability: Not on file       Objective:   Physical Exam:                                                                 Vitals:            Constitutional:    /82 (BP Location: Left arm, Patient Position: Sitting, Cuff Size: Adult)   Pulse 72   Temp 98 6 °F (37 °C) (Temporal)   Wt 85 9 kg (189 lb 4 8 oz)   BMI 30 55 kg/m²   BP Readings from Last 3 Encounters:   12/27/22 118/82   06/17/22 123/65   06/08/22 119/58     Pulse Readings from Last 3 Encounters:   12/27/22 72   06/17/22 62   06/08/22 74         Well developed, well nourished, well groomed  No dysmorphic features  HEENT:  Normocephalic atraumatic  Oropharynx is clear and moist  No oral mucosal lesions  Chest:  Respirations regular and unlabored  Cardiovascular:  Distal extremities warm without palpable edema or tenderness, no observed significant swelling  Musculoskeletal:  (see below under neurologic exam for evaluation of motor function and gait)   Skin:  warm and dry, not diaphoretic  No apparent birthmarks or stigmata of neurocutaneous disease     Psychiatric:  Normal behavior and appropriate affect       Neurological Examination:     Mental status/cognitive function:   Orientated to time, place and person  Recent and remote memory intact  Attention span and concentration as well as fund of knowledge are appropriate for age  Normal language and spontaneous speech  Cranial Nerves:  II-visual fields full  Fundi poorly visualized due to pupillary constriction  III, IV, VI-Pupils were equal, round, and reactive to light and accomodation  Extraocular movements were full and conjugate without nystagmus  Conjugate gaze, normal smooth pursuits, normal saccades   V-facial sensation symmetric  VII-facial expression symmetric, intact forehead wrinkle, strong eye closure, symmetric smile    VIII-hearing grossly intact bilaterally   IX, X-palate elevation symmetric, no dysarthria  XI-shoulder shrug strength intact    XII-tongue protrusion midline  Motor Exam: symmetric bulk and tone throughout, no pronator drift  Power/strength 5/5 bilateral upper and lower extremities, no atrophy, fasciculations or abnormal movements noted  Sensory: grossly intact light touch in all extremities  Reflexes: brachioradialis 2+, biceps 2+, knee 2+, ankle 2+ bilaterally  No ankle clonus  Coordination: Finger nose finger intact bilaterally, no apparent dysmetria, ataxia or tremor noted  Gait: steady casual and tandem gait  Pertinent lab results: None     Pertinent Imaging:   MRI brain with contrast 4/22/2016: 14 x 10 x 12 mm cystic area in the pineal region, compatible with pineal cyst  2-3 nonspecific white matter abnormalities noted in the frontal subcortical area bilaterally    I have personally reviewed imaging and radiology read  Review of Systems:   Constitutional: Negative  Negative for appetite change and fever  HENT: Positive for dental problem  Negative for hearing loss, tinnitus, trouble swallowing and voice change  Eyes: Positive for photophobia and pain  Negative for visual disturbance  Respiratory: Negative  Negative for shortness of breath  Cardiovascular: Negative    Negative for palpitations  Gastrointestinal: Positive for nausea (sometimes)  Negative for vomiting  Endocrine: Negative  Negative for cold intolerance  Genitourinary: Negative  Negative for dysuria, frequency and urgency  Musculoskeletal: Positive for back pain (shoulders), neck pain and neck stiffness  Negative for gait problem and myalgias  Skin: Negative  Negative for rash  Allergic/Immunologic: Negative  Neurological: Positive for dizziness (sometimes), light-headedness (sometimes) and headaches  Negative for tremors, seizures, syncope, facial asymmetry, speech difficulty, weakness and numbness  Hematological: Negative  Does not bruise/bleed easily  Psychiatric/Behavioral: Positive for sleep disturbance (wake up with headaches)  Negative for confusion and hallucinations  All other systems reviewed and are negative  I have spent 45 minutes with the patient today in which greater than 50% of this time was spent in counseling/coordination of care regarding Prognosis, Risks and benefits of tx options, Patient and family education and Impressions  I also spent 15 minutes non face to face for this patient the same day       Activity Minutes   Precharting/reviewing 10   Patient care/counseling 45   Postcharting/care coordination 5       Author:  Shailesh Douglas DO 12/27/2022 9:10 AM

## 2023-01-04 ENCOUNTER — TELEPHONE (OUTPATIENT)
Dept: NEUROLOGY | Facility: CLINIC | Age: 43
End: 2023-01-04

## 2023-01-04 NOTE — TELEPHONE ENCOUNTER
Pt left message  I am calling about two prescription that dr Sheldon Castro had prescribed last week for my migraine,that needs prior authorization    So I am a  ,so I am not sure if I will be able to answer,but you can call  #[phone]

## 2023-01-06 ENCOUNTER — PATIENT MESSAGE (OUTPATIENT)
Dept: NEUROLOGY | Facility: CLINIC | Age: 43
End: 2023-01-06

## 2023-01-06 ENCOUNTER — TELEPHONE (OUTPATIENT)
Dept: NEUROLOGY | Facility: CLINIC | Age: 43
End: 2023-01-06

## 2023-01-06 NOTE — TELEPHONE ENCOUNTER
Abbi, my name is King Nila  My birth date is [birth year]  This is the 2nd time I'm calling in regard to a prior authorization from Dr Davy Haro  I had seen him early last week and he had prescribed two migraine medications  My pharmacy [facility] in [place] has said that they are still waiting for prior authorization  And I have not heard anything back from you as far as calling  I'm having daily headaches, and I really need to get this prescription in order to help  If you could give me a call back and let me know what's going on, i'd really appreciate it  Dr Davy Haro even had told me last week to be checking in  And that's what I'm doing  So this is the 2nd time this week, waiting to hear about the prior authorization  Again, King Nila and my birthdate, [birth year]  And cell number [phone]  Thank you, rodri  Chart reviewed:  Emgality and Kay [place] was ordered  Pharmacy did not notify our office that PA was needed  Will initiate Ubrelvy on separate enc    Urgent Emgality PA initiated on [facility]   (Key: [id number])    Awaiting determination    Called [phone] and left a detailed message on pt's answering machine of the above  It can take 24-48 hrs to get the final determination  Cb if any questions huffman concerns

## 2023-01-06 NOTE — TELEPHONE ENCOUNTER
Urgent Ubrelvy PA initiated on [facility]  Key: [id number] determination    Called [phone] and left a detailed message on pt's answering machine (ok per consent) of the above  It can take 24-48 hrs to get the final determination  Cb if any questions huffman concerns

## 2023-01-09 NOTE — TELEPHONE ENCOUNTER
Called PA dept to check status  Spoke w/ June Rojo and states that PA has been denied  Need documentation why Emgality and Martha Daugherty are both ordered  Advised that Emgality was ordered for preventative and Anais Leblanc was ordered for abortive med  Using both outweighs risk  Urgent appeal initiated via phone   [id number]  03 hr turnaround time    Awaiting determination

## 2023-01-09 NOTE — TELEPHONE ENCOUNTER
Called PA dept to check status  Spoke federico/ Angy Warren and states that PA has been denied  Need documentation why Justice Moras and Emgality are both ordered  Advised that Emgality was ordered for preventative and Justice Moras was ordered for abortive med  Using both outweighs risk  Urgent Justice Moras appeal initiated via phone   [id number]  08 hr turnaround time    Awaiting determination

## 2023-01-11 ENCOUNTER — HOSPITAL ENCOUNTER (OUTPATIENT)
Facility: MEDICAL CENTER | Age: 43
Discharge: HOME | End: 2023-01-11

## 2023-01-11 DIAGNOSIS — E34.8 PINEAL GLAND CYST: ICD-10-CM

## 2023-01-11 DIAGNOSIS — G43.009 MIGRAINE WITHOUT AURA AND WITHOUT STATUS MIGRAINOSUS, NOT INTRACTABLE: ICD-10-CM

## 2023-01-11 RX ADMIN — GADOBUTROL 8 ML: 604.72 INJECTION INTRAVENOUS at 17:28

## 2023-01-12 NOTE — TELEPHONE ENCOUNTER
Called PA dept at [phone] to check status, Spoke to ranjan Murphy appeal approved    Approved from 11/9/22-7/8/23  Confirmed that 2 pens is also approved    He will refax approval letter  Left message for pharm making them aware of the approval and that pt will need 2 pens for loading dose    Left detailed message for pt per communication consentmaking her aware of approval and to make sure that she gets 2 pens for the loading dose  And that ubrelvy pa is still pending   See other encounter for Jc SIDDIQUI

## 2023-01-12 NOTE — TELEPHONE ENCOUNTER
called [facility] tr[facility]ong at [phone] and spoke to ranjan Mcclendon Rattler appeal deined as need documentation the benefit of using a CGRP inhibitor for both prophylactic management and acute management of the pt's headaches outweighs the potential risk     again advised that emgality is preventative and Tivis Rattler is abortive and that the benefits outweigh the risks     another appeal completed over the phone  He states that marking urgent   Will not change the turn around time and this is up to 30 days  He will make note to review this as  Soon as possible    He states that he received a message form the pharmacist that they will review this appeal  req #[id number]

## 2023-01-21 ENCOUNTER — PATIENT MESSAGE (OUTPATIENT)
Dept: NEUROLOGY | Facility: CLINIC | Age: 43
End: 2023-01-21

## 2023-01-23 ENCOUNTER — TELEPHONE (OUTPATIENT)
Dept: NEUROLOGY | Facility: CLINIC | Age: 43
End: 2023-01-23

## 2023-01-23 NOTE — TELEPHONE ENCOUNTER
Per encounter from 1/4, I confirmed with rep that 2 pens was approved    Called PA dept at [phone] and [facility]  States that they typically enter override for 2 pens for loading dose but this was not done  Qty override PA completed and she asked a clinician to review this now  States that we should get approval for this in the next 30 min to 1 hour as she does not see any reason why it wouldn't be approved         [facility]

## 2023-01-23 NOTE — TELEPHONE ENCOUNTER
----- Message from Kristina Gibson sent at 1/21/2023  9:11 AM EST -----  Regarding: Prior Authorization  Contact: [phone]  Hello! I picked up my Emgality from the pharmacy and I when I got home, I noticed there was only 1 pen  Dr Duncan Turong had told me that I should use 2 pens for the first time use  After contacting the pharmacy, they told me that my insurance only approved 1 pen a month  Therefore, I needed to reach out to your office again to reach out to the insurance company to get the approval for 2 pens for the first time use and then just the 1 for monthly use  I'm not sure why this wasn't done initially  I appreciate your attention to this matter    Aline Howard

## 2023-05-09 ENCOUNTER — TELEPHONE (OUTPATIENT)
Dept: NEUROLOGY | Facility: CLINIC | Age: 43
End: 2023-05-09

## 2023-06-20 ENCOUNTER — TELEPHONE (OUTPATIENT)
Dept: NEUROLOGY | Facility: CLINIC | Age: 43
End: 2023-06-20

## 2023-06-20 NOTE — TELEPHONE ENCOUNTER
Patient called back and has been rescheduled with Dr Dinah Downing on 6/26/23 at 12:30pm in [place]

## 2023-08-01 ENCOUNTER — PATIENT MESSAGE (OUTPATIENT)
Dept: NEUROLOGY | Facility: CLINIC | Age: 43
End: 2023-08-01

## 2023-08-02 ENCOUNTER — TELEPHONE (OUTPATIENT)
Dept: NEUROLOGY | Facility: CLINIC | Age: 43
End: 2023-08-02

## 2023-08-02 NOTE — TELEPHONE ENCOUNTER
----- Message from Luis Chandra RN sent at 8/1/2023  8:00 AM EDT -----  Regarding: FW: Emgality  Contact: [phone]    ----- Message -----  From: Margareth Patel  Sent: 8/1/2023   4:48 AM EDT  To: [facility]  Subject: Bruna Faizan Enrique,  [facility] is telling me that the Jessica Jacomele been taking needs approval. I usually take this the first of the month and I will be going on vacation on Thursday for a few days. Can you help me resolve this?   Thanks,  Otto Jane

## 2023-08-02 NOTE — TELEPHONE ENCOUNTER
Hello,     Patient has called asking if you think her Emaglity would get approved today which I explained Urgent prior auth was sent and now it would be up to her insurance if we receive it today or not. She is also asking if medication is approved when she is away , if she can get the medication where she is going on vacation? Call back number [phone]. Patient has called back and re-scheduled her missed appointment to the first late afternoon, as all the other openings were declined due to time.     New appt is now on 11/27/2023, 3 pm, [facility] office with  and placed on the waiting list.

## 2023-08-02 NOTE — TELEPHONE ENCOUNTER
Emgality PA  on     Emgality PA completed on [facility]  Key: [id number]   Urgent request     [employer] message sent to pt

## 2023-08-03 NOTE — TELEPHONE ENCOUNTER
Patient left voicemail to follow up on emgality PA. She is leaving today for vacation and due for medication. [phone].

## 2023-08-04 NOTE — TELEPHONE ENCOUNTER
received vm from 8/3 at 2:42pm- Hi, my name is rAcenio Lazcano. My birth date is [birth year]. I've been trying to get my emgality approved through the insurance. i've been taking it for the past several months. I was due to take it on August 1st. This afternoon i'll be going on vacation. So I'm still waiting for it to be processed. i'm concerned that I won't be taking it for, you know, over a week. I don't know if I'm going to have any side effects or problems due to that. Not sure if anybody is able to reach out to the insurance for me. Jeanette Rio been waiting since last week in regards to this. And so if you could give me a call back, I'd really appreciate it again. rebeca gomez, [birth year] patient of Dr. Marge Leavitt, thank you. Isabel.  ---------------------------------------  Determination not received yet  Called PA dept [phone] and spoke to ranjan. States that it is still pending with a due date of today, End of business day     He then spoke to pharmacist and emgality   Now approved. Approved from 6/4/23-8/3/24  [facility]-[id number]    Left detailed message for pt regarding approval and advised that if needed we can send a script for this fill to another pharm where she is [address].      [employer] message send to pt

## 2023-08-04 NOTE — TELEPHONE ENCOUNTER
Mikayla Bingham         8/2/23 10:57 AM  Note     Hello,      Patient has called asking if you think her Emaglity would get approved today which I explained Urgent prior auth was sent and now it would be up to her insurance if we receive it today or not.     She is also asking if medication is approved when she is away , if she can get the medication where she is going on vacation?     Call back number [phone].        Patient has called back and re-scheduled her missed appointment to the first late afternoon, as all the other openings were declined due to time.     New appt is now on 11/27/2023, 3 pm, [facility] office with  and placed on the waiting list.

## 2023-08-16 ENCOUNTER — OFFICE VISIT (OUTPATIENT)
Dept: NEUROLOGY | Facility: CLINIC | Age: 43
End: 2023-08-16
Payer: COMMERCIAL

## 2023-08-16 VITALS
TEMPERATURE: 98.2 F | DIASTOLIC BLOOD PRESSURE: 88 MMHG | SYSTOLIC BLOOD PRESSURE: 126 MMHG | OXYGEN SATURATION: 99 % | HEIGHT: 66 IN | HEART RATE: 92 BPM | WEIGHT: 193.1 LBS | BODY MASS INDEX: 31.03 KG/M2

## 2023-08-16 DIAGNOSIS — F41.9 ANXIETY AND DEPRESSION: ICD-10-CM

## 2023-08-16 DIAGNOSIS — F32.A ANXIETY AND DEPRESSION: ICD-10-CM

## 2023-08-16 DIAGNOSIS — G47.33 OBSTRUCTIVE SLEEP APNEA (ADULT) (PEDIATRIC): ICD-10-CM

## 2023-08-16 DIAGNOSIS — G43.009 MIGRAINE WITHOUT AURA AND WITHOUT STATUS MIGRAINOSUS, NOT INTRACTABLE: Primary | ICD-10-CM

## 2023-08-16 PROCEDURE — 99214 OFFICE O/P EST MOD 30 MIN: CPT | Performed by: STUDENT IN AN ORGANIZED HEALTH CARE EDUCATION/TRAINING PROGRAM

## 2023-08-16 NOTE — PATIENT INSTRUCTIONS
Headache Calendar  Please maintain a headache calendar  Consider using phone applications such as Migraine Maoy or Citizen of [place] Migraine Tracker     Headache/migraine treatment:   Acute medications (for immediate treatment of a headache): It is ok to take ibuprofen, acetaminophen or naproxen (Advil, Tylenol,  Aleve, Excedrin) if they help your headaches you should limit these to No more than 2-3 times a week to avoid medication overuse/rebound headaches. For your more moderate to severe migraines take this medication early  - Ubrelvy 100mg     Prescription preventive medications for headaches/migraines   (to take every day to help prevent headaches - not to take at the time of headache):   - Emgality/Galcanezumab - 120mg monthly     Lifestyle Recommendations:  [x] SLEEP - Maintain a regular sleep schedule: Adults need at least 7-8 hours of uninterrupted a night. Maintain good sleep hygiene:  Going to bed and waking up at consistent times, avoiding excessive daytime naps, avoiding caffeinated beverages in the evening, avoid excessive stimulation in the evening and generally using bed primarily for sleeping. One hour before bedtime would recommend turning lights down lower, decreasing your activity (may read quietly, listen to music at a low volume). When you get into bed, should eliminate all technology (no texting, emailing, playing with your phone, iPad or tablet in bed). [x] HYDRATION - Maintain good hydration. Drink  2L of fluid a day (4 typical small water bottles)  [x] DIET - Maintain good nutrition. In particular don't skip meals and try and eat healthy balanced meals regularly. [x] TRIGGERS - Look for other triggers and avoid them: Limit caffeine to 1-2 cups a day or less. Avoid dietary triggers that you have noticed bring on your headaches (this could include aged cheese, peanuts, MSG, aspartame and nitrates).   [x] EXERCISE - physical exercise as we all know is good for you in many ways, and not only is good for your heart, but also is beneficial for your mental health, cognitive health and  chronic pain/headaches. I would encourage at the least 5 days of physical exercise weekly for at least 30 minutes. Education and Follow-up  [x] Please call with any questions or concerns. Of course if any new concerning symptoms go to the emergency department.   [x] Follow up in 8 months

## 2023-08-16 NOTE — PROGRESS NOTES
Review of Systems   Constitutional: Negative for appetite change, fatigue and fever. HENT: Negative. Negative for hearing loss, tinnitus, trouble swallowing and voice change. Eyes: Negative. Negative for photophobia, pain and visual disturbance. Respiratory: Negative. Negative for shortness of breath. Cardiovascular: Negative. Negative for palpitations. Gastrointestinal: Negative. Negative for nausea and vomiting. Endocrine: Negative. Negative for cold intolerance. Genitourinary: Negative. Negative for dysuria, frequency and urgency. Musculoskeletal: Negative for back pain, gait problem, myalgias and neck pain. Skin: Negative. Negative for rash. Allergic/Immunologic: Negative. Neurological: Positive for headaches. Negative for dizziness, tremors, seizures, syncope, facial asymmetry, speech difficulty, weakness, light-headedness and numbness. Hematological: Negative. Does not bruise/bleed easily. Psychiatric/Behavioral: Negative. Negative for confusion, hallucinations and sleep disturbance. All other systems reviewed and are negative. Since your last visit are your headaches improved    Any change to the headache type? no    What is your current headache frequency: 5 since last visit    Are you taking your current medications as prescribed? yes    If no, why not? Do you have any side effects?  No    How may days per week do you take an abortive medicine? 0

## 2023-08-16 NOTE — PROGRESS NOTES
[facility] Consult - Follow up   PATIENT:  Keegan Bojorquez  MRN:  [id number]  :  1980  DATE OF SERVICE:  2023  REFERRED BY: No ref. provider found  PMD: Esteban Fuentes DO    Assessment/Plan:   Keegan Bojorquez is a delightful  43 y.o. female with a past medical history that includes GERD, ANGEL, hypertension, obesity, depression, anxiety, SVT here for f/u evaluation of headache. At today's visit, she reports that she has been doing extremely well and has only had about 5 headache days since her last visit. She is tolerating Emgality without any issues. When needed, she is able to take Jenn Sterling and finds that it works very well. She followed up with sleep medicine in the interim and has been using her CPAP more frequently at night. No complaints or concerns at this time. I will have her follow-up with me virtually in about 8 months. Workup:  - Neurologic assessment reveals unremarkable neurological exam.  - MRI brain with contrast 2016: 14 x 10 x 12 mm cystic area in the pineal region, compatible with pineal cyst. 2-3 nonspecific white matter abnormalities noted in the frontal subcortical area bilaterally  - MRI brain with and without contrast 2023: No acute findings. No significant interval change to prior imaging. Well-circumscribed cyst of the pineal gland.   Mild white matter signal abnormality. (I have personally reviewed imaging and radiology read)     Preventative:  - we discussed headache hygiene and lifestyle factors that may improve headaches  - Emgality  - Currently on through other providers: Diltiazem (SVT), Losartan (HTN), Zoloft (mood)  - Past/ failed/contraindicated: Nortriptyline (helped), Effexor (did not help), Topamax (side effects), Gabapentin, Inderal  - future options: CGRP med, botox     Acute:  - discussed not taking over-the-counter or prescription pain medications more than 3 days per week to prevent medication overuse/rebound headache  - Ubrelvy 100mg  - Currently on through other providers: None  - Past/ failed/contraindicated: Triptans (avoid due to history of SVT)  - future options:  prochlorperazine, Toradol IM or p.o., could consider trial of 5 days of Depakote 500 mg nightly or dexamethasone 2 mg daily for prolonged migraine (evaluate prior response to steroids in the setting of cardiac arrhythmia), reyvow, nurtec  Patient instructions   Headache Calendar  Please maintain a headache calendar  Consider using phone applications such as Migraine [place] or [employer] Migraine Tracker     Headache/migraine treatment:   Acute medications (for immediate treatment of a headache): It is ok to take ibuprofen, acetaminophen or naproxen (Advil, Tylenol,  Aleve, Excedrin) if they help your headaches you should limit these to No more than 2-3 times a week to avoid medication overuse/rebound headaches.      For your more moderate to severe migraines take this medication early  - Ubrelvy 100mg     Prescription preventive medications for headaches/migraines   (to take every day to help prevent headaches - not to take at the time of headache):   - Emgality/Galcanezumab - 120mg monthly     Lifestyle Recommendations:  [x]? SLEEP - Maintain a regular sleep schedule: Adults need at least 7-8 hours of uninterrupted a night. Maintain good sleep hygiene:  Going to bed and waking up at consistent times, avoiding excessive daytime naps, avoiding caffeinated beverages in the evening, avoid excessive stimulation in the evening and generally using bed primarily for sleeping. One hour before bedtime would recommend turning lights down lower, decreasing your activity (may read quietly, listen to music at a low volume). When you get into bed, should eliminate all technology (no texting, emailing, playing with your phone, iPad or tablet in bed). [x]? HYDRATION - Maintain good hydration.   Drink  2L of fluid a day (4 typical small water bottles)  [x]? DIET - Maintain good nutrition. In particular don't skip meals and try and eat healthy balanced meals regularly. [x]? TRIGGERS - Look for other triggers and avoid them: Limit caffeine to 1-2 cups a day or less. Avoid dietary triggers that you have noticed bring on your headaches (this could include aged cheese, peanuts, MSG, aspartame and nitrates). [x]? EXERCISE - physical exercise as we all know is good for you in many ways, and not only is good for your heart, but also is beneficial for your mental health, cognitive health and  chronic pain/headaches. I would encourage at the least 5 days of physical exercise weekly for at least 30 minutes.      Education and Follow-up  [x]? Please call with any questions or concerns. Of course if any new concerning symptoms go to the emergency department. [x]? Follow up in 8 months  Subjective:   8/16/23: At today's visit, she reports that her headaches have improved. Since her last visit, she has only experienced about 5 headache days. Continue with Emgality and Ubrelvy without any issues. Previous History:  12/27/22: Ms. Abbie Egan presents with a longstanding history of migraine headaches. She was previously evaluated by neurology in 2017, but has not been seen by anyone since then. She has tried multiple preventive medications without significant benefit, and continues to note nearly daily headaches. We discussed a variety of potential treatment options, but she was interested in trying Emgality monthly injections. From an abortive standpoint, she reports that rizatriptan works well for her, but I have concerns especially with her history of SVT. I have recommended that she stop taking that and I will switch her to Cumeira instead. She does have a history of a pineal cyst that was last evaluated in 2016.   I would like to obtain repeat imaging with an MRI of the brain with contrast.  Finally, I suspect that untreated obstructive sleep apnea is significantly contributing to her ongoing headaches and I have highly encouraged her to follow-up with her sleep physician for a new mask and treatment of her sleep apnea. Past Medical History:     Past Medical History:   Diagnosis Date   • Abnormal Pap smear of cervix     ? HPV   • Anemia     during pregnancy   • Anxiety     currently medicated   • Depression     currently medicated   • GERD (gastroesophageal reflux disease)    • Gestational diabetes    • Headache, tension-type 1998   • Hypertension    • Incisional hernia    • Migraine     prior to pregnancy   • Paraesophageal hernia     6/13/22   • Psychiatric disorder    • Sleep apnea     6/13/22 Pt reports with use of CPAP for sleep apnea   • SVT (supraventricular tachycardia) ([address])     6/13/22 Pt reports with hx of SVT controlled for approx one year with Cardizem   • Visual impairment     wears corrective lenses       Patient Active Problem List   Diagnosis   • Maternal obesity syndrome in third trimester   • Elderly primigravida in third trimester   • Insulin controlled gestational diabetes mellitus in third trimester   • Polyhydramnios in stark pregnancy in third trimester   • 37 weeks gestation of pregnancy   • Migraine without aura   • Herpes, genital   • GERD (gastroesophageal reflux disease)   • Family history of cardiovascular disease   • Essential hypertension   • Depression   • Obesity, Class II, BMI 35-39.9   • ANGEL (obstructive sleep apnea)   • Hiatal hernia   • Ventral hernia without obstruction or gangrene   • Incisional hernia, without obstruction or gangrene   • Status post laparoscopic hernia repair       Medications:      Current Outpatient Medications   Medication Sig Dispense Refill   • diltiazem (CARDIZEM CD) 240 mg 24 hr capsule Take 240 mg by mouth daily Takes at night     • Galcanezumab-gnlm 120 MG/ML SOAJ Inject 120 mg under the skin every 30 (thirty) days Following the first month loading dose of 2 pens.  1 mL 11   • Levonorgestrel (Liletta, 52 MG,) 19.5 MCG/DAY IUD IUD Liletta 20.1 mcg/24 hrs (6 yrs) 52 mg intrauterine device   Take 1 device by intrauterine route. NDC# 1715-2346-82     • losartan (COZAAR) 25 mg tablet Take 25 mg by mouth daily Takes in the am     • sertraline (ZOLOFT) 100 mg tablet Take 200 mg by mouth daily Takes in the evening     • Sodium Fluoride 5000 Sensitive 1.1-5 % GEL      • Ubrogepant (UBRELVY) 100 MG tablet Take 1 tablet (100 mg) one time as needed for migraine. May repeat one additional tablet (100 mg) at least two hours after the first dose. Do not use more than two doses per day 10 tablet 3   • calcium carbonate (TUMS EX) 750 mg chewable tablet Chew 3 tablets if needed (Patient not taking: Reported on 8/16/2023)     • ibuprofen (MOTRIN) 800 mg tablet Take by mouth every 6 (six) hours as needed for mild pain (Patient not taking: Reported on 8/16/2023)       No current facility-administered medications for this visit.         Allergies:    No Known Allergies    Family History:     Family History   Problem Relation Age of Onset   • Diabetes Mother    • Hypertension Mother    • Stroke Mother         36   • Cancer Father    • Esophageal cancer Father    • No Known Problems Sister    • No Known Problems Sister    • No Known Problems Brother    • No Known Problems Maternal Grandmother    • No Known Problems Maternal Grandfather    • No Known Problems Paternal Grandmother    • Heart disease Paternal Grandfather    • Thyroid disease Neg Hx    • Anesthesia problems Neg Hx        Social History:     Social History     Socioeconomic History   • Marital status: Significant Other     Spouse name: Not on file   • Number of children: Not on file   • Years of education: Not on file   • Highest education level: Not on file   Occupational History   • Not on file   Tobacco Use   • Smoking status: Never   • Smokeless tobacco: Never   Vaping Use   • Vaping Use: Never used   Substance and Sexual Activity   • Alcohol use: No   • Drug use: Never   • Sexual activity: Not Currently     Partners: Male     Birth control/protection: I.U.D. Other Topics Concern   • Not on file   Social History Narrative   • Not on file     Social Determinants of Health     Financial Resource Strain: Not on file   Food Insecurity: Not on file   Transportation Needs: Not on file   Physical Activity: Not on file   Stress: Not on file   Social Connections: Not on file   Intimate Partner Violence: Not on file   Housing Stability: Not on file         Objective:   Physical Exam:                                                                 Vitals:            Constitutional:  /88 (BP Location: Left arm, Patient Position: Sitting, Cuff Size: Adult)   Pulse 92   Temp 98.2 °F (36.8 °C) (Temporal)   Ht 5' 6" (1.676 m)   Wt 87.6 kg (193 lb 1.6 oz)   SpO2 99%   BMI 31.17 kg/m²   BP Readings from Last 3 Encounters:   08/16/23 126/88   12/27/22 118/82   06/17/22 123/65     Pulse Readings from Last 3 Encounters:   08/16/23 92   12/27/22 72   06/17/22 62         Well developed, well nourished, well groomed. No dysmorphic features. HEENT:  Normocephalic atraumatic. See neuro exam   Chest:  Respirations appear regular and unlabored. Cardiovascular:  no observed significant swelling. Musculoskeletal:  (see below under neurologic exam for evaluation of motor function and gait)   Skin:  warm and dry, not diaphoretic. Psychiatric:  Normal behavior and appropriate affect       Neurological Examination:     Mental status/cognitive function:   Recent and remote memory intact. Attention span and concentration as well as fund of knowledge are appropriate for age. Normal language and spontaneous speech. Cranial Nerves:  III, IV, VI-Pupils were equal, round. Extraocular movements were full and conjugate   VII-facial expression symmetric  VIII-hearing grossly intact bilaterally   Motor Exam: symmetric bulk throughout.  no atrophy, fasciculations or abnormal movements noted. Coordination:  no apparent dysmetria, ataxia or tremor noted  Gait: steady casual gait  Review of Systems:   Constitutional: Negative for appetite change, fatigue and fever. HENT: Negative. Negative for hearing loss, tinnitus, trouble swallowing and voice change. Eyes: Negative. Negative for photophobia, pain and visual disturbance. Respiratory: Negative. Negative for shortness of breath. Cardiovascular: Negative. Negative for palpitations. Gastrointestinal: Negative. Negative for nausea and vomiting. Endocrine: Negative. Negative for cold intolerance. Genitourinary: Negative. Negative for dysuria, frequency and urgency. Musculoskeletal: Negative for back pain, gait problem, myalgias and neck pain. Skin: Negative. Negative for rash. Allergic/Immunologic: Negative. Neurological: Positive for headaches. Negative for dizziness, tremors, seizures, syncope, facial asymmetry, speech difficulty, weakness, light-headedness and numbness. Hematological: Negative. Does not bruise/bleed easily. Psychiatric/Behavioral: Negative. Negative for confusion, hallucinations and sleep disturbance. All other systems reviewed and are negative. I have spent 15 minutes with patient today in which greater than 50% of this time was spent in counseling/coordination of care regarding Diagnostic results, Prognosis, Risks and benefits of tx options, Patient and family education, Impressions, Documenting in the medical record and Obtaining or reviewing history  . I also spent 15 minutes non face to face for this patient the same day.      Activity Minutes   Precharting/reviewing 10   Patient care/counseling 15   Postcharting/care coordination 5       Author:  Ernie Ortiz DO 8/16/2023 3:17 PM

## 2023-09-17 DIAGNOSIS — G43.009 MIGRAINE WITHOUT AURA AND WITHOUT STATUS MIGRAINOSUS, NOT INTRACTABLE: ICD-10-CM

## 2023-09-18 RX ORDER — GALCANEZUMAB 120 MG/ML
INJECTION, SOLUTION SUBCUTANEOUS
Qty: 1 ML | Refills: 11 | Status: SHIPPED | OUTPATIENT
Start: 2023-09-18

## 2023-09-18 NOTE — TELEPHONE ENCOUNTER
Chart reviewed  Emgality 120 mg 1 pen per 30 days has been approved through 8/3/24    Called [facility] pharmacy, spoke to Rubens and advised that pt has been on emgality. Pt does not need the loading dose. He verbalized understanding but states that the script they have  says loading dose. Requesting updated script for 1 pen per 30 days be sent to their pharmacy. Rx updated.  Pls review and sign off    thanks

## 2024-01-03 DIAGNOSIS — G43.009 MIGRAINE WITHOUT AURA AND WITHOUT STATUS MIGRAINOSUS, NOT INTRACTABLE: ICD-10-CM

## 2024-01-03 RX ORDER — GALCANEZUMAB 120 MG/ML
INJECTION, SOLUTION SUBCUTANEOUS
Qty: 1 ML | Refills: 0 | Status: CANCELLED | OUTPATIENT
Start: 2024-01-03

## 2024-01-09 RX ORDER — GALCANEZUMAB 120 MG/ML
INJECTION, SOLUTION SUBCUTANEOUS
Qty: 1 ML | Refills: 11 | Status: SHIPPED | OUTPATIENT
Start: 2024-01-09

## 2024-01-09 NOTE — TELEPHONE ENCOUNTER
Called pt, no answer. Left detailed message (ok per consent on file) informing her script sent to pharmacy.

## 2024-01-09 NOTE — TELEPHONE ENCOUNTER
Received VM transcription from 1/8/24, 11:16 AM:    Hi, my name is Felipa Gibson. My number is [phone]. And my birth date is [birth year]. I had left a message through the portal about needing a refill for my Emgality and someone did reach out saying that there were 11 refills. However, [facility] is telling [facility] that [facility] need a new prescription because the prescription has been transferred so many times. The reason for that is it was transferred once when I was on vacation due to it not getting authorized right away until I was on vacation in [place]. And then [facility] because they didn't have it in stock. So now [facility] [address] is telling me that they need a new script because its been transferred so many times. So now I'm going on day 8 without the medication and my headaches are starting back up again carrie I usually take it on the 1st. If you can help me with this, I'd appreciate it. Again, my name is Felipa Gibson. Thank you, rodri mace.  -------------------------    Dr. Rao - Rx entered. Please review and sign if in agreement.

## 2024-01-10 NOTE — TELEPHONE ENCOUNTER
Pt called and left a VM 1/9 at 11:37 am regarding this refill request. Already addressed. See message below from Etta.

## 2024-02-21 DIAGNOSIS — G43.009 MIGRAINE WITHOUT AURA AND WITHOUT STATUS MIGRAINOSUS, NOT INTRACTABLE: ICD-10-CM

## 2024-02-21 NOTE — TELEPHONE ENCOUNTER
received vm from 2/19 at 12:47pm-Hello, my name is Parisa gomez. I'm a patient of Dr. Rao. He prescribed me, ubrelvy 100 milligram tablets. I went to refill it through [facility], and they're telling me that there is no file of this medication and that they need another prescription sent in. So again, my name is Felipa gomez. Birth date is [birth year]. My cell number is [phone]. And again, [facility] is telling me that this is not on file and that I need another prescription sent in. Thank you. Isabel.  --------------------------------------------  Last script was sent on 12/27/22     Refill entered, please sign off

## 2024-03-04 ENCOUNTER — TELEPHONE (OUTPATIENT)
Dept: NEUROLOGY | Facility: CLINIC | Age: 44
End: 2024-03-04

## 2024-03-04 NOTE — TELEPHONE ENCOUNTER
Recd  3/1 11:34 AM     Felipa gomez.  My birthday is [birth year]. I'm a patient of Dr. Rao. I had a prescription sent over to [facility].It was the ubrelvy.  [facility] is telling me they need a prior authorization. So if that could be done, I would really appreciate it. My number is [phone]

## 2024-08-29 ENCOUNTER — TELEPHONE (OUTPATIENT)
Dept: NEUROLOGY | Facility: CLINIC | Age: 44
End: 2024-08-29

## 2024-08-29 NOTE — TELEPHONE ENCOUNTER
Received via [employer] request for prior auth for Emgality.  Request scanned into [employer].

## 2024-09-03 NOTE — TELEPHONE ENCOUNTER
Per [facility]-  Meets Pharmacy Policy. Authorization Expiration Date: August 28, 2025.     Called [facility] pharmacy and left a detailed message on their answering machine of Emgality approval and for a call back if any questions.

## 2025-01-24 ENCOUNTER — TELEPHONE (OUTPATIENT)
Age: 45
End: 2025-01-24

## 2025-01-24 DIAGNOSIS — G43.009 MIGRAINE WITHOUT AURA AND WITHOUT STATUS MIGRAINOSUS, NOT INTRACTABLE: ICD-10-CM

## 2025-01-24 NOTE — TELEPHONE ENCOUNTER
Called pt and states that she has been under a lot of stress. Her mom was put on hospice care at home. She was taking care of her mom. Her mom recently passed away. She has had increased in # of migraines. The month of Dec she had about Dec >8 but <15 migraines. She does not have migraines now but when she has a migraine, she would get nauseous, sensitivity to light and sound. She takes Ubrelvy 100 mg prn but does not seem to be helping like it used to. She does not get her emgality on time. There was one month that [facility] had to order it and it took 3 weeks. Advised pt that call [facility] pharmacy 2-3 weeks before she's due and ask them to order it so she gets her emgality on time. Pt verbalized understanding. Pt states that she just picked up her emgality beginning of this month.   Pt states that the increased in migraines could be related to stress and anxiety.   Pt states that she does not want to try botox bec it will be hard for her to come to the office every 3 months.   She is willing to try other meds. Requesting script be sent to [facility] pharmacy on file    Pt states that she has been on fmla during her mom's hospice care. She is scheduled to go back to work on Monday. She is concern that when she gets a really bad migraine w/ nausea, she does not have avail days left to use. Pt wants to know if you will be agreeable to fill out an intermittent fmla for her. Or is there any other option?    Ok to leave detailed message

## 2025-01-24 NOTE — TELEPHONE ENCOUNTER
Pt called in to schedule a follow up appt.     Pt stated she was taking care of her mom before she passed and haven't been able to come in.    Recently since her moms passing she noticed an increase in migraines.    Pt was scheduled for first available that fit her schedule for 06/30/2025.          Pt also mentioned that when she gets her emgality it is always 2-3 weeks late from when its due. She stated [facility] is not quick with filling it.

## 2025-01-25 RX ORDER — GALCANEZUMAB 120 MG/ML
INJECTION, SOLUTION SUBCUTANEOUS
Qty: 3 ML | Refills: 4 | Status: SHIPPED | OUTPATIENT
Start: 2025-01-25

## 2025-01-25 NOTE — TELEPHONE ENCOUNTER
I have not seen her since August 2023 so its hard for me to recommend a new medication without at least a virtual visit. Certainly all the stress she is under along with the recent passing of her mom can contribute to an increase in headache frequency. I would recommend continuing Emgality and Ubrelvy. I can send over a new script for Emgality and ask for a 3 month supply so it is taken consistently.     I can fill out [place] paperwork for her. The most I give out for migraine patients is 3 days per month.

## 2025-01-27 NOTE — TELEPHONE ENCOUNTER
Called and left a detailed message on pt's answering machine of all of the below and for a call back if any questions. Can send fmla form to [phone] or via [employer].

## 2025-02-01 ENCOUNTER — PATIENT MESSAGE (OUTPATIENT)
Dept: NEUROLOGY | Facility: CLINIC | Age: 45
End: 2025-02-01

## 2025-02-03 NOTE — PATIENT COMMUNICATION
Jeffrey SIDDIQUI approved thru 8/28/25    Called pharm, states that she was trying to fill too early.  She was able to process this thru insurance now but does not have it in stock,.  She will order this now.  States that it should be in tomorrow   She states that They can't order the med until they can process thru insurance and they do not keep this in stock as they don't have many pts on this.    She states that pt can call her insurance to see how early she can fill and she can then request the refilll then so that they can order it.      [employer] message sent to pt

## 2025-04-10 DIAGNOSIS — G43.009 MIGRAINE WITHOUT AURA AND WITHOUT STATUS MIGRAINOSUS, NOT INTRACTABLE: ICD-10-CM

## 2025-04-11 RX ORDER — GALCANEZUMAB 120 MG/ML
INJECTION, SOLUTION SUBCUTANEOUS
Qty: 1 ML | Refills: 11 | Status: SHIPPED | OUTPATIENT
Start: 2025-04-11

## 2025-04-21 ENCOUNTER — PATIENT MESSAGE (OUTPATIENT)
Dept: NEUROLOGY | Facility: CLINIC | Age: 45
End: 2025-04-21

## 2025-04-21 NOTE — PATIENT COMMUNICATION
Jeffrey SIDDIQUI approved thru 8/28/25    Last script sent on 4/11 with 11 refills    Called pharm, states that they are getting it ready for pt for $80 and it Will be ready later today.

## 2025-06-30 ENCOUNTER — OFFICE VISIT (OUTPATIENT)
Dept: NEUROLOGY | Facility: CLINIC | Age: 45
End: 2025-06-30
Payer: COMMERCIAL

## 2025-06-30 ENCOUNTER — TELEPHONE (OUTPATIENT)
Dept: NEUROLOGY | Facility: CLINIC | Age: 45
End: 2025-06-30

## 2025-06-30 VITALS
WEIGHT: 211.1 LBS | DIASTOLIC BLOOD PRESSURE: 70 MMHG | HEIGHT: 66 IN | TEMPERATURE: 98.2 F | BODY MASS INDEX: 33.93 KG/M2 | SYSTOLIC BLOOD PRESSURE: 118 MMHG | HEART RATE: 69 BPM | OXYGEN SATURATION: 98 %

## 2025-06-30 DIAGNOSIS — F32.A ANXIETY AND DEPRESSION: ICD-10-CM

## 2025-06-30 DIAGNOSIS — G47.33 OBSTRUCTIVE SLEEP APNEA (ADULT) (PEDIATRIC): ICD-10-CM

## 2025-06-30 DIAGNOSIS — G43.009 MIGRAINE WITHOUT AURA AND WITHOUT STATUS MIGRAINOSUS, NOT INTRACTABLE: Primary | ICD-10-CM

## 2025-06-30 DIAGNOSIS — Z76.89 ENCOUNTER TO ESTABLISH CARE: ICD-10-CM

## 2025-06-30 DIAGNOSIS — F41.9 ANXIETY AND DEPRESSION: ICD-10-CM

## 2025-06-30 DIAGNOSIS — G43.009 MIGRAINE WITHOUT AURA AND WITHOUT STATUS MIGRAINOSUS, NOT INTRACTABLE: ICD-10-CM

## 2025-06-30 PROCEDURE — 99213 OFFICE O/P EST LOW 20 MIN: CPT | Performed by: STUDENT IN AN ORGANIZED HEALTH CARE EDUCATION/TRAINING PROGRAM

## 2025-06-30 RX ORDER — BUSPIRONE HYDROCHLORIDE 10 MG/1
TABLET ORAL EVERY 12 HOURS
COMMUNITY

## 2025-06-30 RX ORDER — ZOLMITRIPTAN 2.5 MG/1
TABLET, FILM COATED ORAL
Refills: 0 | OUTPATIENT
Start: 2025-06-30

## 2025-06-30 RX ORDER — LORAZEPAM 0.5 MG/1
0.5 TABLET ORAL
COMMUNITY
Start: 2025-03-24

## 2025-06-30 RX ORDER — GALCANEZUMAB 120 MG/ML
INJECTION, SOLUTION SUBCUTANEOUS
Qty: 3 ML | Refills: 4 | Status: SHIPPED | OUTPATIENT
Start: 2025-06-30

## 2025-06-30 NOTE — PATIENT INSTRUCTIONS
Headache Calendar  Please maintain a headache calendar  Consider using phone applications such as Migraine Buddy or [employer] Migraine Tracker     Headache/migraine treatment:   Acute medications (for immediate treatment of a headache):   It is ok to take ibuprofen, acetaminophen or naproxen (Advil, Tylenol,  Aleve, Excedrin) if they help your headaches you should limit these to No more than 2-3 times a week to avoid medication overuse/rebound headaches.      For your more moderate to severe migraines take this medication early  - Ubrelvy 100mg as needed for migraine. Okay to take 2nd tablet 2 hours later. No more than 2 tablets in 24 hours     Prescription preventive medications for headaches/migraines   - Emgality/Galcanezumab - 120mg monthly     Lifestyle Recommendations:  [x] SLEEP - Maintain a regular sleep schedule: Adults need at least 7-8 hours of uninterrupted a night. Maintain good sleep hygiene:  Going to bed and waking up at consistent times, avoiding excessive daytime naps, avoiding caffeinated beverages in the evening, avoid excessive stimulation in the evening and generally using bed primarily for sleeping.  One hour before bedtime would recommend turning lights down lower, decreasing your activity (may read quietly, listen to music at a low volume). When you get into bed, should eliminate all technology (no texting, emailing, playing with your phone, iPad or tablet in bed).  [x] HYDRATION - Maintain good hydration.  Drink  2L of fluid a day (4 typical small water bottles)  [x] DIET - Maintain good nutrition. In particular don't skip meals and try and eat healthy balanced meals regularly.  [x] TRIGGERS - Look for other triggers and avoid them: Limit caffeine to 1-2 cups a day or less. Avoid dietary triggers that you have noticed bring on your headaches (this could include aged cheese, peanuts, MSG, aspartame and nitrates).  [x] EXERCISE - physical exercise as we all know is good for you in many ways,  and not only is good for your heart, but also is beneficial for your mental health, cognitive health and  chronic pain/headaches. I would encourage at the least 5 days of physical exercise weekly for at least 30 minutes.      Education and Follow-up  [x] Please call with any questions or concerns. Of course if any new concerning symptoms go to the emergency department.  [x] Follow up in 8 months

## 2025-06-30 NOTE — TELEPHONE ENCOUNTER
Pharmacy comment: Alternative Requested:THE PRESCRIBED MEDICATION IS NOT COVERED BY INSURANCE. PLEASE CONSIDER CHANGING TO ONE OF THE SUGGESTED COVERED ALTERNATIVES.       Forwarding to PA team for review.

## 2025-06-30 NOTE — PROGRESS NOTES
Since your last visit are your headaches Remained the Same    Any change to the headache type? Wakes up with headaches more frequently    What is your current headache frequency (total headache days out of 30): 5/30 (of those, how many are more severe: 0)    Are you taking your current medications as prescribed? yes      Do you have any side effects? no    How may days per week do you take an abortive medicine? 0-1/7

## 2025-06-30 NOTE — PROGRESS NOTES
Neurology Ambulatory Visit  Name: Felipa Gibson       : 1980       MRN: [id number]   Encounter Provider: Glynn Rao DO   Encounter Date: 2025  Encounter department: [facility]    Felipa Gibson is a delightful  45 y.o. female with a past medical history that includes GERD, ANGEL, hypertension, obesity, depression, anxiety, SVT here for f/u evaluation of headache.     Assessment and Plan  1. Migraine without aura and without status migrainosus, not intractable  Assessment & Plan:  At today's visit, she reports no significant change to her headaches.  She had a flareup in December when her mother was sick and eventually passed away.  We suspected that her increase in headaches at that time was in the setting of stress.  She was also not using her CPAP consistently then.  Over the past couple weeks she has been using her CPAP nightly and although she has not yet noticed a change, she does report that in the past when she was using Emgality monthly combined with CPAP nightly, her overall headache frequency/intensity was much less.  We decided to hold off on making any changes to her regimen at this time and continue with Emgality for prevention.  I recommended that she go on their website to obtain the coupon card to assist with co-pay. Ubrelvy is also effective for the more severe episodes so she was happy continuing with that.  She is unhappy with her current PCP who is not interested in prescribing weight loss medications for her so I suggested that she establish with [facility].  Orders:  -     Galcanezumab-gnlm (Emgality) 120 MG/ML SOAJ; INJECT 120 MG UNDER THE SKIN EVERY 30 DAYS.  -     Ubrogepant (UBRELVY) 100 MG tablet; Take 1 tablet (100 mg) one time as needed for migraine. May repeat one additional tablet (100 mg) at least two hours after the first dose. Do not use more than two doses per day  2. Obstructive sleep apnea (adult) (pediatric)  3.  Anxiety and depression  4. Encounter to establish care  -     Ambulatory referral to Family Practice; Future      She will Return in about 8 months (around 2/28/2026).    Workup  - Neurologic assessment reveals unremarkable neurological exam.  - MRI brain with contrast 4/22/2016: 14 x 10 x 12 mm cystic area in the pineal region, compatible with pineal cyst. 2-3 nonspecific white matter abnormalities noted in the frontal subcortical area bilaterally  - MRI brain with and without contrast 1/11/2023: No acute findings.  No significant interval change to prior imaging.  Well-circumscribed cyst of the pineal gland.  Mild white matter signal abnormality. (I have personally reviewed imaging and radiology read)     Preventative:  - we discussed headache hygiene and lifestyle factors that may improve headaches  - Emgality  - Currently on through other providers: Buspirone, Diltiazem (SVT), Losartan (HTN), Zoloft (mood)  - Past/ failed/contraindicated: Nortriptyline (helped), Effexor (did not help), Topamax (side effects), Gabapentin, Inderal  - future options: Memantine, Diamox, CGRP med, botox     Acute:  - discussed not taking over-the-counter or prescription pain medications more than 3 days per week to prevent medication overuse/rebound headache  - Ubrelvy 100mg  - Currently on through other providers: None  - Past/ failed/contraindicated: Triptans (avoid due to history of SVT)  - future options:  prochlorperazine, Toradol IM or p.o., could consider trial of 5 days of Depakote 500 mg nightly or dexamethasone 2 mg daily for prolonged migraine (evaluate prior response to steroids in the setting of cardiac arrhythmia), reyvow, nurtec, zavzpret    History of Present Illness       Interval updates:  6/30/25: Patient reached out in January due to increased stress in the setting of her mom being placed on hospice care.  Mom recently passed away and as a result of this, she endorsed an increase in headaches. At today's visit,  she reports no significant change to her headaches.  She reports about 15 headache days per month of which 5 are severe.  She finds that she typically wakes up with headaches in the morning more frequently.  She is using Emgality monthly injections without any issues. At times this is delayed by a few days to a week due to issues with insurance.  From an abortive standpoint, Ubrelvy is effective. With regards to sleep, she has only been using her CPAP consistently for the past 2 weeks. She was not using her CPAP during December/January.    Prior History  8/16/23: At today's visit, she reports that her headaches have improved.  Since her last visit, she has only experienced about 5 headache days.  Continue with Emgality and Ubrelvy without any issues.  12/27/22: Ms. Gibson presents with a longstanding history of migraine headaches.  She was previously evaluated by neurology in 2017, but has not been seen by anyone since then.  She has tried multiple preventive medications without significant benefit, and continues to note nearly daily headaches.  We discussed a variety of potential treatment options, but she was interested in trying Emgality monthly injections.  From an abortive standpoint, she reports that rizatriptan works well for her, but I have concerns especially with her history of SVT.  I have recommended that she stop taking that and I will switch her to Ubrelvy instead.  She does have a history of a pineal cyst that was last evaluated in 2016.  I would like to obtain repeat imaging with an MRI of the brain with contrast.  Finally, I suspect that untreated obstructive sleep apnea is significantly contributing to her ongoing headaches and I have highly encouraged her to follow-up with her sleep physician for a new mask and treatment of her sleep apnea.         Objective     Physical Exam:                                                               Vitals:            Constitutional:    /70 (BP  "Location: Right arm, Patient Position: Sitting, Cuff Size: Large)   Pulse 69   Temp 98.2 °F (36.8 °C) (Temporal)   Ht 5' 6\" (1.676 m)   Wt 95.8 kg (211 lb 1.6 oz)   SpO2 98%   BMI 34.07 kg/m²   BP Readings from Last 3 Encounters:   06/30/25 118/70   08/16/23 126/88   12/27/22 118/82     Pulse Readings from Last 3 Encounters:   06/30/25 69   08/16/23 92   12/27/22 72         Well developed, well nourished, well groomed. No dysmorphic features.       HEENT:  Normocephalic atraumatic. See neuro exam   Chest:  Respirations appear regular and unlabored.    Cardiovascular:  no observed significant swelling.    Musculoskeletal:  (see below under neurologic exam for evaluation of motor function and gait)   Skin:  warm and dry, not diaphoretic.    Psychiatric:  Normal behavior and appropriate affect       Neurological Examination:     Mental status/cognitive function:   Recent and remote memory intact. Attention span and concentration as well as fund of knowledge are appropriate for age. Normal language and spontaneous speech.  Cranial Nerves:  III, IV, VI-Pupils were equal, round. Extraocular movements were full and conjugate   VII-facial expression symmetric  VIII-hearing grossly intact bilaterally   Motor Exam: symmetric bulk throughout. no atrophy, fasciculations or abnormal movements noted.   Coordination:  no apparent dysmetria, ataxia or tremor noted  Gait: steady casual gait      Voice recognition software was used in the generation of this note. There may be unintentional errors including grammatical errors, spelling errors, or pronoun errors.   "

## 2025-06-30 NOTE — ASSESSMENT & PLAN NOTE
At today's visit, she reports no significant change to her headaches.  She had a flareup in December when her mother was sick and eventually passed away.  We suspected that her increase in headaches at that time was in the setting of stress.  She was also not using her CPAP consistently then.  Over the past couple weeks she has been using her CPAP nightly and although she has not yet noticed a change, she does report that in the past when she was using Emgality monthly combined with CPAP nightly, her overall headache frequency/intensity was much less.  We decided to hold off on making any changes to her regimen at this time and continue with Emgality for prevention.  I recommended that she go on their website to obtain the coupon card to assist with co-pay. Ubrelvy is also effective for the more severe episodes so she was happy continuing with that.  She is unhappy with her current PCP who is not interested in prescribing weight loss medications for her so I suggested that she establish with [facility]'s family practice.

## 2025-07-02 PROBLEM — F41.1 GENERALIZED ANXIETY DISORDER WITH PANIC ATTACKS: Status: ACTIVE | Noted: 2022-01-20

## 2025-07-02 PROBLEM — D64.9 ANEMIA: Status: ACTIVE | Noted: 2020-06-03

## 2025-07-02 PROBLEM — Z3A.37 37 WEEKS GESTATION OF PREGNANCY: Status: RESOLVED | Noted: 2018-04-20 | Resolved: 2025-07-02

## 2025-07-02 PROBLEM — O40.3XX0 POLYHYDRAMNIOS IN SINGLETON PREGNANCY IN THIRD TRIMESTER: Status: RESOLVED | Noted: 2018-04-07 | Resolved: 2025-07-02

## 2025-07-02 PROBLEM — E66.811 CLASS 1 OBESITY DUE TO EXCESS CALORIES WITH SERIOUS COMORBIDITY AND BODY MASS INDEX (BMI) OF 34.0 TO 34.9 IN ADULT: Status: ACTIVE | Noted: 2021-06-28

## 2025-07-02 PROBLEM — Z87.19 STATUS POST LAPAROSCOPIC HERNIA REPAIR: Status: RESOLVED | Noted: 2022-06-29 | Resolved: 2025-07-02

## 2025-07-02 PROBLEM — E66.09 CLASS 1 OBESITY DUE TO EXCESS CALORIES WITH SERIOUS COMORBIDITY AND BODY MASS INDEX (BMI) OF 34.0 TO 34.9 IN ADULT: Status: ACTIVE | Noted: 2021-06-28

## 2025-07-02 PROBLEM — F41.0 GENERALIZED ANXIETY DISORDER WITH PANIC ATTACKS: Status: ACTIVE | Noted: 2022-01-20

## 2025-07-02 PROBLEM — R73.01 IMPAIRED FASTING BLOOD SUGAR: Status: ACTIVE | Noted: 2023-06-13

## 2025-07-02 PROBLEM — I47.10 SVT (SUPRAVENTRICULAR TACHYCARDIA) (HCC): Status: ACTIVE | Noted: 2025-07-02

## 2025-07-02 PROBLEM — F33.1 MODERATE EPISODE OF RECURRENT MAJOR DEPRESSIVE DISORDER (HCC): Status: ACTIVE | Noted: 2022-01-20

## 2025-07-02 PROBLEM — Z98.890 STATUS POST LAPAROSCOPIC HERNIA REPAIR: Status: RESOLVED | Noted: 2022-06-29 | Resolved: 2025-07-02

## 2025-07-03 ENCOUNTER — OFFICE VISIT (OUTPATIENT)
Dept: FAMILY MEDICINE CLINIC | Facility: CLINIC | Age: 45
End: 2025-07-03
Attending: STUDENT IN AN ORGANIZED HEALTH CARE EDUCATION/TRAINING PROGRAM
Payer: COMMERCIAL

## 2025-07-03 ENCOUNTER — TELEPHONE (OUTPATIENT)
Age: 45
End: 2025-07-03

## 2025-07-03 VITALS
SYSTOLIC BLOOD PRESSURE: 108 MMHG | OXYGEN SATURATION: 98 % | WEIGHT: 213.2 LBS | HEART RATE: 75 BPM | DIASTOLIC BLOOD PRESSURE: 80 MMHG | BODY MASS INDEX: 34.27 KG/M2 | HEIGHT: 66 IN

## 2025-07-03 DIAGNOSIS — E66.811 CLASS 1 OBESITY DUE TO EXCESS CALORIES WITH SERIOUS COMORBIDITY AND BODY MASS INDEX (BMI) OF 34.0 TO 34.9 IN ADULT: ICD-10-CM

## 2025-07-03 DIAGNOSIS — I10 ESSENTIAL HYPERTENSION: Primary | ICD-10-CM

## 2025-07-03 DIAGNOSIS — E66.09 CLASS 1 OBESITY DUE TO EXCESS CALORIES WITH SERIOUS COMORBIDITY AND BODY MASS INDEX (BMI) OF 34.0 TO 34.9 IN ADULT: ICD-10-CM

## 2025-07-03 DIAGNOSIS — Z11.59 NEED FOR HEPATITIS C SCREENING TEST: ICD-10-CM

## 2025-07-03 DIAGNOSIS — Z23 ENCOUNTER FOR IMMUNIZATION: ICD-10-CM

## 2025-07-03 DIAGNOSIS — I47.10 SVT (SUPRAVENTRICULAR TACHYCARDIA) (HCC): ICD-10-CM

## 2025-07-03 DIAGNOSIS — F41.0 GENERALIZED ANXIETY DISORDER WITH PANIC ATTACKS: ICD-10-CM

## 2025-07-03 DIAGNOSIS — D64.9 ANEMIA, UNSPECIFIED TYPE: ICD-10-CM

## 2025-07-03 DIAGNOSIS — G47.33 MODERATE OBSTRUCTIVE SLEEP APNEA: ICD-10-CM

## 2025-07-03 DIAGNOSIS — F33.1 MODERATE EPISODE OF RECURRENT MAJOR DEPRESSIVE DISORDER (HCC): ICD-10-CM

## 2025-07-03 DIAGNOSIS — R73.01 IMPAIRED FASTING BLOOD SUGAR: ICD-10-CM

## 2025-07-03 DIAGNOSIS — Z86.32 HISTORY OF GESTATIONAL DIABETES: ICD-10-CM

## 2025-07-03 DIAGNOSIS — F41.1 GENERALIZED ANXIETY DISORDER WITH PANIC ATTACKS: ICD-10-CM

## 2025-07-03 DIAGNOSIS — Z76.89 ENCOUNTER TO ESTABLISH CARE: ICD-10-CM

## 2025-07-03 PROBLEM — O09.513 ELDERLY PRIMIGRAVIDA IN THIRD TRIMESTER: Status: RESOLVED | Noted: 2018-04-05 | Resolved: 2025-07-03

## 2025-07-03 PROCEDURE — 99204 OFFICE O/P NEW MOD 45 MIN: CPT | Performed by: FAMILY MEDICINE

## 2025-07-03 PROCEDURE — 90471 IMMUNIZATION ADMIN: CPT | Performed by: FAMILY MEDICINE

## 2025-07-03 PROCEDURE — 90677 PCV20 VACCINE IM: CPT | Performed by: FAMILY MEDICINE

## 2025-07-03 RX ORDER — TIRZEPATIDE 2.5 MG/.5ML
2.5 INJECTION, SOLUTION SUBCUTANEOUS WEEKLY
Qty: 2 ML | Refills: 0 | Status: SHIPPED | OUTPATIENT
Start: 2025-07-03 | End: 2025-07-31

## 2025-07-03 NOTE — ASSESSMENT & PLAN NOTE
Depression Screening Follow-up Plan: Patient's depression screening was positive with a PHQ-9 score of 11. Continue regular follow-up with their psychologist/therapist/psychiatrist who is managing their mental health condition(s).  Follows with psychiatrist and therapist   Recommend support group like [facility]

## 2025-07-03 NOTE — TELEPHONE ENCOUNTER
PA for (Zepbound) 2.5 mg/0.5 mLSUBMITTED to [employer]     via    [x]CMM-KEY:  [id number]      [x]PA sent as URGENT    All office notes, labs and other pertaining documents and studies sent. Clinical questions answered. Awaiting determination from insurance company.     Turnaround time for your insurance to make a decision on your Prior Authorization can take 7-21 business days.

## 2025-07-03 NOTE — ASSESSMENT & PLAN NOTE
Recommend Zepbound (could also consider Wegovy and Saxenda)   Pt has not had success with weight loss with healthy diet and exercise alone, but will continue to work on these.     Patient is to call their insurance to see if this is covered, and then call pharmacies to find the medication.  When she does this we can submit the medication for prior authorization.  Follow up 3 months after starting GLP.    Patient has not been on any weight loss medicines in the past 12 months  This is a new start - she is not on another GLP medication currently or in the past 12 months   No personal or family history of thyroid cancer  No personal history of pancreatitis  A GLP medication is recommended for improvement in weight and comorbidities associated with obesity  - see problem list.  Risks and benefits and side effects of medication reviewed    Today's weight. Starting GLP today. 7/3/2025 Weight - Scale: 96.7 kg (213 lb 3.2 oz)   Body mass index is 34.41 kg/m².      Wt Readings from Last 3 Encounters:   07/03/25 96.7 kg (213 lb 3.2 oz)   06/30/25 95.8 kg (211 lb 1.6 oz)   08/16/23 87.6 kg (193 lb 1.6 oz)     BMI Readings from Last 3 Encounters:   07/03/25 34.41 kg/m²   06/30/25 34.07 kg/m²   08/16/23 31.17 kg/m²         Orders:    tirzepatide (Zepbound) 2.5 mg/0.5 mL auto-injector; Inject 0.5 mL (2.5 mg total) under the skin once a week for 28 days

## 2025-07-03 NOTE — PROGRESS NOTES
Follow up visit   Name: Felipa Gibson      : 1980      MRN: [id number]  Encounter Provider: Lena Pereyra DO  Encounter Date: 7/3/2025   Encounter department: [facility]    :  Assessment & Plan  Essential hypertension  Well controlled   Follows with [facility] cardio   Orders:    CBC and differential    Comprehensive metabolic panel; Future    Hemoglobin A1C; Future    Lipid panel; Future    Vitamin D 25 hydroxy; Future    TSH, 3rd generation; Future    Ferritin; Future    Vitamin B12; Future    Impaired fasting blood sugar  Update labs   Orders:    CBC and differential    Comprehensive metabolic panel; Future    Hemoglobin A1C; Future    Lipid panel; Future    Vitamin D 25 hydroxy; Future    TSH, 3rd generation; Future    Ferritin; Future    Vitamin B12; Future    Anemia, unspecified type  Update labs       Orders:    CBC and differential    Comprehensive metabolic panel; Future    Hemoglobin A1C; Future    Lipid panel; Future    Vitamin D 25 hydroxy; Future    TSH, 3rd generation; Future    Ferritin; Future    Vitamin B12; Future    History of gestational diabetes  Update labs   Orders:    CBC and differential    Comprehensive metabolic panel; Future    Hemoglobin A1C; Future    Lipid panel; Future    Vitamin D 25 hydroxy; Future    TSH, 3rd generation; Future    Ferritin; Future    Vitamin B12; Future    SVT (supraventricular tachycardia) (HCC)  Follows with cardio        Class 1 obesity due to excess calories with serious comorbidity and body mass index (BMI) of 34.0 to 34.9 in adult    Recommend Zepbound (could also consider Wegovy and Saxenda)   Pt has not had success with weight loss with healthy diet and exercise alone, but will continue to work on these.     Patient is to call their insurance to see if this is covered, and then call pharmacies to find the medication.  When she does this we can submit the medication for prior authorization.  Follow up 3 months after  starting GLP.    Patient has not been on any weight loss medicines in the past 12 months  This is a new start - she is not on another GLP medication currently or in the past 12 months   No personal or family history of thyroid cancer  No personal history of pancreatitis  A GLP medication is recommended for improvement in weight and comorbidities associated with obesity  - see problem list.  Risks and benefits and side effects of medication reviewed    Today's weight. Starting GLP today. 7/3/2025 Weight - Scale: 96.7 kg (213 lb 3.2 oz)   Body mass index is 34.41 kg/m².      Wt Readings from Last 3 Encounters:   07/03/25 96.7 kg (213 lb 3.2 oz)   06/30/25 95.8 kg (211 lb 1.6 oz)   08/16/23 87.6 kg (193 lb 1.6 oz)     BMI Readings from Last 3 Encounters:   07/03/25 34.41 kg/m²   06/30/25 34.07 kg/m²   08/16/23 31.17 kg/m²         Orders:    tirzepatide (Zepbound) 2.5 mg/0.5 mL auto-injector; Inject 0.5 mL (2.5 mg total) under the skin once a week for 28 days    Moderate episode of recurrent major depressive disorder (HCC)  Depression Screening Follow-up Plan: Patient's depression screening was positive with a PHQ-9 score of 11. Continue regular follow-up with their psychologist/therapist/psychiatrist who is managing their mental health condition(s).  Follows with psychiatrist and therapist   Recommend support group like [facility]        Moderate obstructive sleep apnea  Using CPAP   Weight loss and GLP will help   Orders:    tirzepatide (Zepbound) 2.5 mg/0.5 mL auto-injector; Inject 0.5 mL (2.5 mg total) under the skin once a week for 28 days    Need for hepatitis C screening test  Agrees to   Orders:    Hepatitis C Antibody; Future    CBC and differential    Comprehensive metabolic panel; Future    Hemoglobin A1C; Future    Lipid panel; Future    Vitamin D 25 hydroxy; Future    TSH, 3rd generation; Future    Ferritin; Future    Vitamin B12; Future    Encounter for immunization  Given today   Orders:    Pneumococcal  Conjugate Vaccine 20-valent (Pcv20)    Encounter to establish care    Orders:    Ambulatory referral to Family Practice    Generalized anxiety disorder with panic attacks  Follows with psychiatrist          Assessment & Plan        Will request last colonoscopy with Dr. Bre Trivedi up to date - send to [facility]   Follow up:   No follow-ups on file.        Felipa is a 45 y.o. female who presents today for follow up on chronic conditions.     Chief Complaint   Patient presents with    New Patient Visit     Would like to discuss weight gain - has seen cardio, neuro and pulm who recommended an injectable. Last pcp would not discuss this with her     History of Present Illness     Concerns today:  [facility]   Old records reviewed     Mom  in January     Psych Dr. 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 no GERD. sleeping well. mood as above.     PHQ-2/9 Depression Screening    Little interest or pleasure in doing things: 1 - several days  Feeling down, depressed, or hopeless: 1 - several days  Trouble falling or staying asleep, or sleeping too much: 0 - not at all  Feeling tired or having little energy: 3 - nearly every day  Poor appetite or overeating: 3 - nearly every day  Feeling bad about yourself - or that you are a failure or have let yourself or your family down: 2 - more than half the days  Trouble concentrating on things, such as reading the newspaper or watching television: 1 - several days  Moving or  "speaking so slowly that other people could have noticed. Or the opposite - being so fidgety or restless that you have been moving around a lot more than usual: 0 - not at all  Thoughts that you would be better off dead, or of hurting yourself in some way: 0 - not at all  PHQ-9 Score: 11  PHQ-9 Interpretation: Moderate depression              Objective   /80 (Patient Position: Sitting, Cuff Size: Large)   Pulse 75   Ht 5' 6\" (1.676 m)   Wt 96.7 kg (213 lb 3.2 oz)   SpO2 98%   BMI 34.41 kg/m²     /80 (Patient Position: Sitting, Cuff Size: Large)   Pulse 75   Ht 5' 6\" (1.676 m)   Wt 96.7 kg (213 lb 3.2 oz)   SpO2 98%   BMI 34.41 kg/m²     BP Readings from Last 3 Encounters:   07/03/25 108/80   06/30/25 118/70   08/16/23 126/88     Wt Readings from Last 3 Encounters:   07/03/25 96.7 kg (213 lb 3.2 oz)   06/30/25 95.8 kg (211 lb 1.6 oz)   08/16/23 87.6 kg (193 lb 1.6 oz)       Physical Exam  Constitutional: she appears well-developed and well-nourished.   HENT: Head: Normocephalic.   Right Ear: External ear normal. Tympanic membrane normal.   Left Ear: External ear normal. Tympanic membrane normal.   Nose: Nose normal. No mucosal edema, No rhinorrhea.   Right sinus exhibits no maxillary sinus tenderness.   Left sinus exhibits no maxillary sinus tenderness.   Mouth/Throat: Oropharynx is clear and moist.   Eyes: Normal conjunctiva.  No erythema. No discharge.  Neck: No pain on exam. Neck supple.   Cardiovascular: Normal rate, regular rhythm and normal heart sounds.    Pulmonary/Chest: Effort normal and breath sounds normal. No wheezes. No rales. No rhonchi.   Abdominal: Soft. Bowel sounds are normal. There is no tenderness.   Musculoskeletal: she exhibits no edema.   Lymphadenopathy: she has no cervical adenopathy.   Neurological: she  is alert and oriented to person, place, and time.   Skin: Skin is warm and dry. No rashes.  Psychiatric: she  has a normal mood and affect. her behavior is normal. " Thought content normal.   Vitals reviewed.      Current Medications:  Current Medications[1]         [1]   Current Outpatient Medications   Medication Sig Dispense Refill    busPIRone (BUSPAR) 10 mg tablet Take by mouth in the morning and in the evening.      diltiazem (CARDIZEM CD) 240 mg 24 hr capsule Take 240 mg by mouth in the morning. Takes at night.      Galcanezumab-gnlm (Emgality) 120 MG/ML SOAJ INJECT 120 MG UNDER THE SKIN EVERY 30 DAYS. 3 mL 4    ibuprofen (MOTRIN) 800 mg tablet Take by mouth every 6 (six) hours as needed for mild pain      Levonorgestrel (Liletta, 52 MG,) 19.5 MCG/DAY IUD IUD       LORazepam (ATIVAN) 0.5 mg tablet Take 0.5 mg by mouth      losartan (COZAAR) 25 mg tablet Take 25 mg by mouth in the morning. Takes in the am.      sertraline (ZOLOFT) 100 mg tablet Take 200 mg by mouth in the morning. Takes in the evening.      Sodium Fluoride 5000 Sensitive 1.1-5 % GEL       Ubrogepant (UBRELVY) 100 MG tablet Take 1 tablet (100 mg) one time as needed for migraine. May repeat one additional tablet (100 mg) at least two hours after the first dose. Do not use more than two doses per day 16 tablet 6    calcium carbonate (TUMS EX) 750 mg chewable tablet Chew 3 tablets if needed (Patient not taking: Reported on 8/16/2023)      Multiple Vitamin (MULTIVITAMIN ADULT PO) Take 1 tablet by mouth every 24 hours       No current facility-administered medications for this visit.

## 2025-07-03 NOTE — ASSESSMENT & PLAN NOTE
Update labs   Orders:    CBC and differential    Comprehensive metabolic panel; Future    Hemoglobin A1C; Future    Lipid panel; Future    Vitamin D 25 hydroxy; Future    TSH, 3rd generation; Future    Ferritin; Future    Vitamin B12; Future

## 2025-07-03 NOTE — PATIENT INSTRUCTIONS
Weight Loss Medications/GLPs     Saxenda, Wegovy, Zepbound are FDA approved for weight loss with a prior authorization. Call to see if these are approved by your insurance.     Zepbound is also covered for moderate to severe obstructive sleep apnea and obesity.      We have the additional options of Ozempic or Mounjaro if you have type 2 diabetes only.     Some of these medications can also be used to lower the chances of a heart attack or stroke. These medications include liraglutide (Victoza), semaglutide (Ozempic*, Wegovy) subcutaneously, and dulaglutide (Trulicity).  They may be covered if you have a diagnosis of coronary artery disease/atherosclerosis, heart failure, peripheral vascular disease or have had a heart attack. These medications may also potentially prevent the progression of kidney disease.     Medicare does not pay for GLPs for weight loss. As of Aug 2024 Medicare Part D will cover Ozempic, Moujaro, Rybelsus and Wegovy for for the treatment of type 2 diabetes and cardiovascular disease. Some of these may be covered for cardiovascular disease with obesity.  This does not mean that all of these medications will be in your formulary, and they may require prior authorization, may be a high tier drug or you may need to pay a large percetage of the cost due to coinsurance.         [x] How to start the process: please read carefully   Call to see if one of the above medications are covered by your insurance.  Next call around to see if any pharmacies have the medication in stock. We cannot fill it until you know they have it in stock.   Next send us a Availink message to let us know which one so we can send it in. The medication will need to be increased either weekly (for Saxenda) or monthly (for the others), so we cannot send it to a mail away pharmacy until you get to the maintenance dose.   Once  the prescription gets sent in we will need to get a prior auth from the insurance company. Allow 1 - 2  weeks for this process. If you have not hear anything regarding approval or denial from our office by this time, please reach out to us.    [x] For refills:  Once you start the medication you will need to contact our office 2 weeks before your due for the medication for a refill. Let us know how you are doing on the medication (losing weight, no side effects), if you want the same dose or the next dose, what pharmacy you want it sent to, and if you need a one month or a 3 month supply for a mail away plan.   You can call or send a [employer] message. Do not send a Medication Refill message, or have the pharmacy contact us. Be sure to give enough time as your insurance may require prior auth for every new dose (this can take up to 21 days).   We will need to see you about every 3 months to check on how this is working and to communicate with your insurance company that you are doing well. Please keep these scheduled appointments. You can expect about a 2% weight loss per month in the first 6 months. You should see at least a 5% weight loss in the first 12 weeks of use. By 6 months it may be up to 10%.     Medications:    [x] Zepbound (tirzepatide)  Month 1:  2.5 mg subQ once weekly for 4 weeks  Month 2:  *5 mg weekly x 4 weeks    Month 3:  7.5 mg weekly x 4 weeks   Month 4:  *10 mg weekly x 4 weeks   Month 5: 12.5 mg weekly x 4 weeks   Month 6:  *15 mg weekly (max dose/maintenance dose)   *maintenance doses - can stay on these long term     How to give it:  Inject subQ into the thigh, abdomen, or upper arm; rotate injection sites 2,1.  Administer at any time of day, with or without meals 2,1.  Administer separately from insulin (do not mix the products); may inject both medications in the same body region but not adjacent to each other 2.  The day of the weekly administration can be changed as long as the time between the 2 doses is at least 3 days (72 hours) 2,1.  Administer a missed dose as soon as possible within 4  days (96 hours) of missed dose; if more than 4 days have passed, skip the missed dose and administer next dose on regularly scheduled day and resume regular once weekly dosing schedule  Adults only    Call if:  You feel fullness or pain in the thyroid area (front and middle of your neck)    Bad upper abdominal pain that is not going away.   Nausea or vomiting that persists.  Any changes in vision 2.  You have worsening depression, suicidal ideation, or unusual changes in behavior 1.    Other precautions:  It is advised if you use an oral hormonal contraceptive to switch to a non-oral contraceptive method, or add a barrier method of contraception for 4 weeks after initiation and for 4 weeks after each dose escalation 2.  Side effects may include nausea, diarrhea, decreased appetite, vomiting, constipation, dyspepsia, and abdominal pain 2.  Take precautions to avoid dehydration 2.  If you have diabetes monitor for symptoms of hypoglycemia and report difficulties with glycemic control 1.  If you miss a dose administer a missed dose as soon as possible within 4 days. If more than 4 days have passed, skip the missed dose, administer the next dose on the regularly scheduled day, and resume the regular once-weekly dosing schedule 2.    [] Wegovy (semaglutide)  Wegovy dosing:  Week 1 - 4:  0.25 mg weekly   Week 5 - 8:  0.5 mg weekly   Week 9 - 12:  1 mg weekly   Week 13 - 16:  *1.7 mg weekly   Maintenance from there:  *2.4 mg weekly.   *maintenance doses - can stay on these long term    Wegovy is a WEEKLY non-insulin injectable. GLP-1Agonist  Semaglutide (Wegovy) FDA approved in 2014 as adjunct to reduced calorie diet and increased physical activity for chronic weight management in adults with initial BMI of >= 30 kg/m2 or >= 27 kg/m2 with >= 1 weight-related comorbid condition. There were several studies run over 68 week periods of time.  During this time they diabetic patients lose on average 6.2% of their body weight. In  a different trial involving non-diabetics the weight loss was closer to 12 - 15%. Age13+.          Action: Cause slower gastric emptying which will increase feeling full with meals.  This feeling fullness will allow for you to reduce calorie intake sooner than usual and begin to lose some weight.  These medications also will reduce production of glucose form your liver to allow for better control of your sugars.       Reminders: These medications do not cause low blood sugars.   Keep in the refrigerator until you use it once, then keep it out of refrigerator.  Clean your hands and site of injection to avoid infection risks. Store used needles in old plastic laundry detergent bin or coffee bin, then tape it when full and discard in garbage.      Side Effects:  Common side effects include full sensation with eating, nausea which usually improves over the first 1-2 weeks. Soreness, redness or lump at site of injection. Constipation, stomach pain, headache, fatigue, indigestion, dizziness, bloating, burping.      Dangers:  Pancreatitis can happen with this medicine for some people; therefore if you have ever had pancreatitis or have severe nausea vomiting and abdominal pain while on this medicine stop it immediately and call us or go to ER for assistance. Similarly gallbladder disease.  Do not use if you have had a history of thyroid canceror a family history of MEN syndrome.  It can cause low blood sugar if mixed with certain other diabetes medications.  If you have type 2 diabetes you should already be seeing an eye doctor - let them know you are on this medication and contact them if there are changes in your vision.  See the package insert for all serious possible side effects     [] Saxenda (liraglutide)  This is how to start Saxenda. It is a daily injection to help with weight loss.     WEEK 1: 0.6mg daily  -if tolerated,  WEEK 2: 1.2mg daily  -if tolerated,  WEEK 3: 1.8mg daily  -if tolerated,  WEEK 4: 2.4mg  daily  -if tolerated,  WEEK 5: *3mg daily and then continue at this dose   *maintenance doses - can stay on these long term    If you miss more than 3 days of the medication you should restart this from the beginning.     VISIT [email or web address] to see about coupons, how to give yourself injections, and more information     If you develop nausea or vomiting, STOP the medication for a few days, then DECREASE to the previous dose that you tolerated well.   Stay well hydrated.  You can inject in your stomach, upper leg or upper arm   Unused pens should be stored in the refrigerator, but the pen in use can he kept at room temperature for up to 30 days   If you develop severe abdominal pain, pain radiating from your abdomen to the back, or fever associated with abdominal pain/vomiting, please call or go to the ER as this can be a sign of pancreatitis which can be an adverse effect of the medication.    Action: Cause slower gastric emptying which will increase feeling full with meals.  This feeling fullness will allow for you to reduce calorie intake sooner than usual and begin to lose some weight.  These medications also will reduce production of glucose form your liver to allow for better control of your sugars. Age 12+.   Reminders: These medications do not cause low blood sugars.   Keep in the refrigerator until you use it once, then keep it out of refrigerator.  You ONLY need to prime the pen upon open it monthly.  Clean your hands and site of injection to avoid infection risks. Store used needles in old plastic laundry detergent bin or coffee bin, then tape it when full and discard in garbage.   Side Effects:  Common side effects include full sensation with eating, nausea which usually improves over the first 1-2 weeks. Soreness, redness or lump at site of injection.  Dangers:  Pancreatitis can happen with this medicine for some people; therefore if you have ever had pancreatitis or have severe nausea vomiting and  abdominal pain while on this medicine stop it immediately and call us or go to ER for assistance. Do not use if you have had a history of thyroid cancer or a family history of MEN syndrome. If you are diabetic and use this medication with insulin or sulfonylureas there is a risk of low blood sugar. Monitor your sugars more closely.  If you are not able to urinate properly this could be a sign of a kidney problem. Let us know right away. In the clinical trials there were issues with gallbladder problems like gallstones. Let us know if you develop severe abdominal pain. If you have new depression or thoughts of suicide you should also contact us right away, and call 911 or go to your nearest emergency room.       Covered only if you have type 2 diabetes:    [] Mounjarno (tirzepatide)  dosing instructions  Month 1 -  2.5 mg weekly x 4 weeks   Month 2 - 5 mg weekly x 4 weeks  Month 3 - 7.5 mg weekly x 4 week   Month 4 - 10 mg weekly x 4 weeks  Month 5 - 12.5 mg weekly x 4 weeks   Week 6 and thereafter - 15 mg weekly     You can inject this in your stomach, thigh, or upper arm.   For savings card program go to [employer] or  6-597-UsamsPj (1-840.654.4281)  Common side effects  nausea, diarrhea, decreased appetite, vomiting, constipation, indigestion, and stomach pain.   To help these try eating smaller meals, stop eating when you feel full, avoid eating fat or fatty foods, try eating bland foods like toast, crackers or rice.   If you take birth control pills by mouth these may not work as well while you are on this medication and you may want to increase your dose or use another form of birth control.     Contraindicated with medullary thyroid carcinoma MTC or MEN2.   It may cause tumors of the thyroid, including thyroid cancer.   Call right away and stop medication if severe pain in stomach with or without vomiting (pancreatitis)   Watch for low blood sugar if taking this medication with a sulfanuria or insulin.    Drink plenty of fluids to avoid dehydration.   If you having gallbladder issues such as pain in your upper abdomen, yellowing of your skin, fever, klaus colored stools, stop this medicine can all your doctor.   Call if you have sudden change in your vision.     [] Ozempic (semaglutide)  How to start Ozempic  Month 1:  0.25 mg weekly x 4 weeks   Month 2:  0.5 mg weekly x 4 weeks   Month 3:  1 mg weekly x 4 weeks   Month 4:  2 mg weekly thereafter     Go to the Ozempic or GoodRx website to look coupons to save on this medication.   About the Ozempic® Pen  Ozempic® (semaglutide) injection 0.5 mg or 1 mg   Ozempic PI (levi-pi.com)     Ozempic is a  WEEKLY non-insulin injectable. GLP-1Agonist  Victoza (liraglutide) daily, Byetta (exenatide) twice daily, Adlyxin (lixisenatide)  Trulicity (dulaglutide) Bydureon(Exenatide) , Ozempic (semaglutide), Rybelsus (semaglutide),          Action: Cause slower gastric emptying which will increase feeling full with meals.  This feeling fullness will allow for you to reduce calorie intake sooner than usual and begin to lose some weight.  These medications also will reduce production of glucose form your liver to allow for better control of your sugars.       Reminders: These medications do not cause low blood sugars.   Keep in the refrigerator until you use it once, then keep it out of refrigerator.  Clean your hands and site of injection to avoid infection risks. Store used needles in old plastic laundry detergent bin or coffee bin, then tape it when full and discard in garbage.      Side Effects:  Common side effects include full sensation with eating, nausea which usually improves over the first 1-2 weeks. Soreness, redness or lump at site of injection.     Dangers:  Pancreatitis can happen with this medicine for some people; therefore if you have ever had pancreatitis or have severe nausea vomiting and abdominal pain while on this medicine stop it immediately and call us or go to  ER for assistance. Do not use if you have had a history of thyroid canceror a family history of MEN syndrome.

## 2025-07-03 NOTE — ASSESSMENT & PLAN NOTE
Well controlled   Follows with [facility] cardio   Orders:    CBC and differential    Comprehensive metabolic panel; Future    Hemoglobin A1C; Future    Lipid panel; Future    Vitamin D 25 hydroxy; Future    TSH, 3rd generation; Future    Ferritin; Future    Vitamin B12; Future

## 2025-07-07 ENCOUNTER — VBI (OUTPATIENT)
Dept: ADMINISTRATIVE | Facility: OTHER | Age: 45
End: 2025-07-07

## 2025-07-07 NOTE — LETTER
Procedure Request Form: Cervical Cancer Screening      Date Requested: 25  Patient: Felipa Gibson  Patient : 1980   Referring Provider: Lena Pereyra, DO        Date of Procedure _____[id number]_________________________       The above patient has informed us that they have completed their   most recent Cervical Cancer Screening at your facility. Please complete   this form and attach all corresponding procedure reports/results.    Comments __________________________________________________________  ____________________________________________________________________  ____________________________________________________________________  ____________________________________________________________________    Facility Completing Procedure _________________________________________    Form Completed By (print name) _______________________________________      Signature __________________________________________________________      These reports are needed for  compliance.    Please fax this completed form and a copy of the procedure report to the [facility] Based Department as soon as possible via Fax [phone], gutierrez [place]: Phone [phone]. Our office is located at [address] [place] [address].    We thank you for your assistance in treating our mutual patient.

## 2025-07-07 NOTE — LETTER
Procedure Request Form: Cervical Cancer Screening Lab Reports      Date Requested: 25  Patient: Felipa Gibson  Patient : 1980   Referring Provider: Lena Pereyra, DO        Date of Procedure ______2018___& 2023_____________________       The above patient has informed us that they have completed their   most recent Cervical Cancer Screening at your facility. Please complete   this form and attach all corresponding procedure reports/results.    Comments __________________________________________________________  ____________________________________________________________________  ____________________________________________________________________  ____________________________________________________________________    Facility Completing Procedure _________________________________________    Form Completed By (print name) _______________________________________      Signature __________________________________________________________      These reports are needed for  compliance.    Please fax this completed form and a copy of the procedure report to the [facility] Based Department as soon as possible via Fax [phone], gutierrez [place]: Phone [phone]. Our office is located at [address] [place] [address].    We thank you for your assistance in treating our mutual patient.

## 2025-07-08 ENCOUNTER — TELEPHONE (OUTPATIENT)
Dept: ADMINISTRATIVE | Facility: OTHER | Age: 45
End: 2025-07-08

## 2025-07-08 NOTE — LETTER
Procedure Request Form: Cervical Cancer Screening      Date Requested: 25  Patient: Felipa Gibson  Patient : 1980   Referring Provider: Lena Pereyra, DO        Date of Procedure ______________________________       The above patient has informed us that they have completed their   most recent Cervical Cancer Screening at your facility. Please complete   this form and attach all corresponding procedure reports/results.    Comments __________________________________________________________  ____________________________________________________________________  ____________________________________________________________________  ____________________________________________________________________    Facility Completing Procedure _________________________________________    Form Completed By (print name) _______________________________________      Signature __________________________________________________________      These reports are needed for  compliance.    Please fax this completed form and a copy of the procedure report to the [facility] Based Department as soon as possible via Fax [phone], gutierrez [place]: Phone [phone]. Our office is located at [address] [place] [address].    We thank you for your assistance in treating our mutual patient.

## 2025-07-08 NOTE — TELEPHONE ENCOUNTER
Upon review of the In Basket request and the patient's chart, initial outreach has been made via fax to facility. Please see Contacts section for details.  PAP last done 2018. 2021 No Papa was done. Letter sent to [facility]    Thank you  Miladys Salinas MA

## 2025-07-08 NOTE — TELEPHONE ENCOUNTER
Called office and they will fax over today 5/2/23 and 12/12/18 for her paps. I also let her know to ck A[facility] as it states something different.

## 2025-07-08 NOTE — TELEPHONE ENCOUNTER
----- Message from Trish JONES sent at 7/8/2025  9:27 AM EDT -----  Regarding: Care Gap Request  07/08/25 9:27 AM    Hello, our patient above has had Pap Smear (HPV) aka Cervical Cancer Screening completed/performed. Please assist in updating the patient chart by pulling the Care Everywhere (CE) document. The date of service is 1/14/21.     Thank you,  NEYMAR Goldsmith PG

## 2025-07-08 NOTE — TELEPHONE ENCOUNTER
Upon review of the In Basket request and the patient's chart, initial outreach has been made via fax to facility. Please see Contacts section for details.     Thank you  Casper Gillette MA

## 2025-07-09 NOTE — TELEPHONE ENCOUNTER
Upon review of the In Basket request we were able to locate, review, and update the patient chart as requested for Pap Smear (HPV) aka Cervical Cancer Screening.    Any additional questions or concerns should be emailed to the Practice Liaisons via the appropriate education email address, please do not reply via In Basket.    Thank you  Casper Gillette MA   [facility]

## 2025-07-10 NOTE — TELEPHONE ENCOUNTER
Ubrelvy approved; approved from 5/9/25-7/8/26  Notified [facility]; medication processed through successfully   [employer] message sent to pt

## 2025-07-11 NOTE — TELEPHONE ENCOUNTER
PA for (Zepbound) 2.5 mg/0.5 mL  DENIED    Reason:(Screenshot if applicable)        Message sent to office clinical pool Yes    Denial letter scanned into Media Yes    We can gladly do an appeal but the process can take about 30-60 days to provide determination. Please have the office staff schedule a Peer to Peer at phone [phone] . If an appeal is truly warranted please have Provider send clinical documentation to the PA department to support the appeal.     **Please follow up with your patient regarding denial and next steps**

## 2025-07-17 NOTE — TELEPHONE ENCOUNTER
Upon review of the In Basket request we were able to locate, review, and update the patient chart as requested for Pap Smear (HPV) aka Cervical Cancer Screening. 5-2-23    Any additional questions or concerns should be emailed to the Practice Liaisons via the appropriate education email address, please do not reply via In Basket.    Thank you  Miladys Salinas MA   [facility]

## 2025-07-24 LAB
25(OH)D3+25(OH)D2 SERPL-MCNC: 25 NG/ML (ref 30–100)
ALBUMIN SERPL-MCNC: 4.2 G/DL (ref 3.5–5.7)
ALP SERPL-CCNC: 65 U/L (ref 35–120)
ALT SERPL-CCNC: 10 U/L
ANION GAP SERPL CALCULATED.3IONS-SCNC: 7 MMOL/L (ref 3–11)
AST SERPL-CCNC: 15 U/L
BASOPHILS # BLD AUTO: 0 THOU/CMM (ref 0–0.1)
BASOPHILS NFR BLD AUTO: 1 %
BILIRUB SERPL-MCNC: 0.6 MG/DL (ref 0.2–1)
BUN SERPL-MCNC: 11 MG/DL (ref 7–25)
CALCIUM SERPL-MCNC: 9.4 MG/DL (ref 8.5–10.5)
CHLORIDE SERPL-SCNC: 100 MMOL/L (ref 100–109)
CHOLEST SERPL-MCNC: 188 MG/DL
CHOLEST/HDLC SERPL: 2.5 {RATIO}
CO2 SERPL-SCNC: 27 MMOL/L (ref 21–31)
CREAT SERPL-MCNC: 0.76 MG/DL (ref 0.4–1.1)
CYTOLOGY CMNT CVX/VAG CYTO-IMP: ABNORMAL
DIFFERENTIAL METHOD BLD: NORMAL
EOSINOPHIL # BLD AUTO: 0.1 THOU/CMM (ref 0–0.5)
EOSINOPHIL NFR BLD AUTO: 2 %
ERYTHROCYTE [DISTWIDTH] IN BLOOD BY AUTOMATED COUNT: 12.8 % (ref 12–16)
EST. AVERAGE GLUCOSE BLD GHB EST-MCNC: 108 MG/DL
FERRITIN SERPL-MCNC: 34 NG/ML (ref 11–306.8)
GFR/BSA.PRED SERPLBLD CYS-BASED-ARV: 98 ML/MIN/{1.73_M2}
GLUCOSE SERPL-MCNC: 70 MG/DL (ref 65–99)
HBA1C MFR BLD: 5.4 %
HCT VFR BLD AUTO: 40.2 % (ref 35–43)
HCV AB SERPL QL IA: NONREACTIVE
HDLC SERPL-MCNC: 75 MG/DL (ref 23–92)
HGB BLD-MCNC: 13.5 G/DL (ref 11.5–14.5)
LDLC SERPL CALC-MCNC: 103 MG/DL
LYMPHOCYTES # BLD AUTO: 1.8 THOU/CMM (ref 1–3)
LYMPHOCYTES NFR BLD AUTO: 24 %
MCH RBC QN AUTO: 31.2 PG (ref 26–34)
MCHC RBC AUTO-ENTMCNC: 33.7 G/DL (ref 32–37)
MCV RBC AUTO: 93 FL (ref 80–100)
MONOCYTES # BLD AUTO: 0.5 THOU/CMM (ref 0.3–1)
MONOCYTES NFR BLD AUTO: 6 %
NEUTROPHILS # BLD AUTO: 4.9 THOU/CMM (ref 1.8–7.8)
NEUTROPHILS NFR BLD AUTO: 67 %
NONHDLC SERPL-MCNC: 113 MG/DL
PLATELET # BLD AUTO: 236 THOU/CMM (ref 140–350)
PMV BLD REES-ECKER: 9.5 FL (ref 7.5–11.3)
POTASSIUM SERPL-SCNC: 4.2 MMOL/L (ref 3.5–5.2)
PROT SERPL-MCNC: 6.8 G/DL (ref 6.3–8.3)
RBC # BLD AUTO: 4.34 MILL/CMM (ref 3.7–4.7)
SODIUM SERPL-SCNC: 134 MMOL/L (ref 135–145)
TRIGL SERPL-MCNC: 51 MG/DL
TSH SERPL-ACNC: 1.08 UIU/ML (ref 0.45–5.33)
VIT B12 SERPL-MCNC: 467 PG/ML (ref 180–914)
WBC # BLD AUTO: 7.3 THOU/CMM (ref 4–10)

## 2025-07-30 ENCOUNTER — TELEPHONE (OUTPATIENT)
Age: 45
End: 2025-07-30

## 2025-08-12 ENCOUNTER — PATIENT MESSAGE (OUTPATIENT)
Dept: FAMILY MEDICINE CLINIC | Facility: CLINIC | Age: 45
End: 2025-08-12

## 2025-08-15 ENCOUNTER — PATIENT MESSAGE (OUTPATIENT)
Dept: FAMILY MEDICINE CLINIC | Facility: CLINIC | Age: 45
End: 2025-08-15

## 2025-08-15 ENCOUNTER — TELEPHONE (OUTPATIENT)
Dept: NEUROLOGY | Facility: CLINIC | Age: 45
End: 2025-08-15

## (undated) DEVICE — SUT VICRYL 0 SH 27 IN J418

## (undated) DEVICE — SUT VICRYL 0 UR-6 27 IN J603H

## (undated) DEVICE — [HIGH FLOW INSUFFLATOR,  DO NOT USE IF PACKAGE IS DAMAGED,  KEEP DRY,  KEEP AWAY FROM SUNLIGHT,  PROTECT FROM HEAT AND RADIOACTIVE SOURCES.]: Brand: PNEUMOSURE

## (undated) DEVICE — CANNULA SEAL

## (undated) DEVICE — GLOVE SRG BIOGEL ECLIPSE 7.5

## (undated) DEVICE — ALLENTOWN LAP CHOLE APP PACK: Brand: CARDINAL HEALTH

## (undated) DEVICE — GLOVE INDICATOR PI UNDERGLOVE SZ 6.5 BLUE

## (undated) DEVICE — BLUE HEAT SCOPE WARMER

## (undated) DEVICE — NEEDLE SPINAL 25G X 3.5 IN QUINCKE

## (undated) DEVICE — SEAL

## (undated) DEVICE — TROCAR: Brand: KII SLEEVE

## (undated) DEVICE — ADHESIVE SKIN HIGH VISCOSITY EXOFIN 1ML

## (undated) DEVICE — COLUMN DRAPE

## (undated) DEVICE — MONOPOLAR CURVED SCISSORS: Brand: ENDOWRIST

## (undated) DEVICE — GLOVE INDICATOR PI UNDERGLOVE SZ 8 BLUE

## (undated) DEVICE — TIP COVER ACCESSORY

## (undated) DEVICE — CHLORAPREP HI-LITE 26ML ORANGE

## (undated) DEVICE — PLUMEPEN PRO 10FT

## (undated) DEVICE — STRL PENROSE DRAIN 18" X 1/4": Brand: CARDINAL HEALTH

## (undated) DEVICE — VESSEL SEALER EXTEND: Brand: ENDOWRIST

## (undated) DEVICE — NEEDLE HYPO 22G X 1-1/2 IN

## (undated) DEVICE — SUT VICRYL 0 REEL 54 IN J287G

## (undated) DEVICE — REDUCER: Brand: ENDOWRIST

## (undated) DEVICE — BLADELESS OBTURATOR: Brand: WECK VISTA

## (undated) DEVICE — ADHESIVE SKIN HIGH VISCOSITY EXOFIN MICRO 0.5ML

## (undated) DEVICE — FENESTRATED BIPOLAR FORCEPS: Brand: ENDOWRIST

## (undated) DEVICE — SCD SEQUENTIAL COMPRESSION COMFORT SLEEVE MEDIUM KNEE LENGTH: Brand: KENDALL SCD

## (undated) DEVICE — TROCAR: Brand: KII FIOS FIRST ENTRY

## (undated) DEVICE — INTENDED FOR TISSUE SEPARATION, AND OTHER PROCEDURES THAT REQUIRE A SHARP SURGICAL BLADE TO PUNCTURE OR CUT.: Brand: BARD-PARKER SAFETY BLADES SIZE 11, STERILE

## (undated) DEVICE — SUT MONOCRYL 4-0 PS-2 27 IN Y426H

## (undated) DEVICE — DRAPE EQUIPMENT RF WAND

## (undated) DEVICE — ARM DRAPE

## (undated) DEVICE — PMI DISPOSABLE PUNCTURE CLOSURE DEVICE / SUTURE GRASPER: Brand: PMI

## (undated) DEVICE — SUT ETHIBOND 0 SH/SH 36 IN X524H

## (undated) DEVICE — GLOVE SRG BIOGEL 6.5

## (undated) DEVICE — MEGA SUTURECUT ND: Brand: ENDOWRIST

## (undated) DEVICE — PENCIL ELECTROSURG E-Z CLEAN -0035H

## (undated) DEVICE — LUBRICANT INST ELECTROLUBE ANTISTK WO PAD